# Patient Record
Sex: MALE | Race: WHITE | NOT HISPANIC OR LATINO | Employment: FULL TIME | URBAN - METROPOLITAN AREA
[De-identification: names, ages, dates, MRNs, and addresses within clinical notes are randomized per-mention and may not be internally consistent; named-entity substitution may affect disease eponyms.]

---

## 2017-01-12 ENCOUNTER — OFFICE VISIT (OUTPATIENT)
Dept: URGENT CARE | Facility: CLINIC | Age: 47
End: 2017-01-12
Payer: COMMERCIAL

## 2017-01-12 PROCEDURE — 99213 OFFICE O/P EST LOW 20 MIN: CPT

## 2017-05-05 ENCOUNTER — ALLSCRIPTS OFFICE VISIT (OUTPATIENT)
Dept: OTHER | Facility: OTHER | Age: 47
End: 2017-05-05

## 2018-01-10 NOTE — PSYCH
Psych Med Mgmt    Appearance: was calm and cooperative  Observed mood: mood appropriate  Observed mood: affect appropriate  Speech: a normal rate  Thought processes: coherent/organized  Hallucinations: no hallucinations present  Thought Content: no delusions  Abnormal Thoughts: The patient has no suicidal thoughts and no homicidal thoughts  Orientation: The patient is oriented to person, place and time  Recent and Remote Memory: short term memory intact and long term memory intact  Attention Span And Concentration: concentration intact  Insight: Insight intact  Judgment: His judgment was intact  Treatment Recommendations: Follow up in 3 months  Same meds  He reports normal appetite, normal energy level and no weight change  Looks and feels better  Has lost approxiamately 20 pounds  Eating chicken and fish  No depression  Meds are working well  Still estranged from his daughter, but is feeling less guilty about their relationship  Continue currrent meds  Labs--OK>  Vitals  Signs [Data Includes: Current Encounter]   Recorded: 02VYM6372 13:90RZ   Systolic: 646  Diastolic: 80  Height: 5 ft 8 in  Weight: 220 lb   BMI Calculated: 33 45  BSA Calculated: 2 13    DSM    Provisional Diagnosis: Bipolar Depression    GAF--70  Assessment    1  Bipolar 2 disorder (296 89) (F31 81)    Plan    1  FLUoxetine HCl - 20 MG Oral Capsule (PROzac); take 1 capsule daily   2  Lithium Carbonate 300 MG Oral Tablet; Take 1 tablet twice daily    Active Problems    1  Bipolar 2 disorder (296 89) (F31 81)    Allergies    1  No Known Drug Allergies    Current Meds   1  FLUoxetine HCl - 20 MG Oral Capsule; take 1 capsule daily; Therapy: 29Apr2016 to (Evaluate:88Ztd5086)  Requested for: 29Apr2016; Last   Rx:29Apr2016 Ordered   2  Lithium Carbonate 300 MG Oral Tablet; Take 1 tablet twice daily;    Therapy: 28QIP4539 to (Alfred Pace)  Requested for: 29Apr2016; Last   Veda Sarkar Ordered    Family Psych History  Father    1  Family history of Anxiety and depression  Daughter    2  Family history of Anxiety and depression    Social History    · Employed   · Former smoker (A67 15) (H46 334)   ·     End of Encounter Meds    1  FLUoxetine HCl - 20 MG Oral Capsule (PROzac); take 1 capsule daily; Therapy: 87LKO7045 to (Evaluate:70Afo4666)  Requested for: 24Jun2016; Last   Rx:24Jun2016 Ordered   2  Lithium Carbonate 300 MG Oral Tablet; Take 1 tablet twice daily;    Therapy: 84UHO8179 to (Evaluate:19Gxt0175)  Requested for: 24Jun2016; Last   VY:20ZPH4298 Ordered    Signatures   Electronically signed by : Lake Parks; Jun 24 2016  4:14PM EST                       (Author)    Electronically signed by : Esperanza Monteiro MD; Jun 27 2016  3:46PM EST

## 2018-01-11 NOTE — PSYCH
Assessment    1  Bipolar 2 disorder (296 89) (F31 81)    Plan    1  FLUoxetine HCl - 20 MG Oral Capsule (PROzac); take 1 capsule daily   2  Lithium Carbonate 300 MG Oral Tablet; Take 1 tablet twice daily    Chief Complaint  "I need back on my meds"      History of Present Illness  39year old male works at [employer] as a   Had done very well on Prozac and Lithium  Had lost insurance and had to stop meds  Resides in [facility] life at home is good  Is more social with other couples  Recent celebration of [place]  Requests to go back on Prozac and Lithium  He said he felt that he functioned better mentally with these meds  Lots of drama in his life with his estranged daughter and he r   No suicidal thoughts  No recent violent episodes  History of mood disorder  Will restart meds  Past Psychiatric History    Past Psychiatric History: History of being on Prozac and Lithium  Worked well for mood disorder  Substance Abuse Hx    Substance Abuse History: Denies  Family Psych History  Father    1  Family history of Anxiety and depression  Daughter    2  Family history of Anxiety and depression    Social History    · Employed   · Former smoker (I16 55) (F2 616)   ·     History Of Phys/Sex Abuse Or Perpetration    History Of Phys/Sex Abuse or Perpetration: None  Vitals  Signs [Data Includes: Current Encounter]   Recorded: 64MRD3280 82:85JG   Systolic: 375  Diastolic: 80  Height: 5 ft 8 in  Weight: 220 lb   BMI Calculated: 33 45  BSA Calculated: 2 13    Physical Exam    Appearance: was calm and cooperative and good eye contact  Observed mood: was dysphoric, depressed and anxious  Observed mood: affect was broad  Speech: a normal rate  Thought processes: flight of ideas  Hallucinations: no hallucinations present  Thought Content: no delusions  Abnormal Thoughts: The patient has no suicidal thoughts and no homicidal thoughts  Orientation: The patient is oriented to person, place and time  Recent and Remote Memory: short term memory intact and long term memory intact  Attention Span And Concentration: concentration intact  Insight: Insight intact  Judgment: His judgment was intact  Treatment Recommendations: Follow up in 8 weeks  DSM    Provisional Diagnosis: Bipolar Depression  GAF-65  End of Encounter Meds    1  FLUoxetine HCl - 20 MG Oral Capsule (PROzac); take 1 capsule daily; Therapy: 55Wzz3648 to (Evaluate:73Erx2996)  Requested for: 74Gzb4772; Last   Rx:93Rhl6372 Ordered   2  Lithium Carbonate 300 MG Oral Tablet; Take 1 tablet twice daily; Therapy: 53Kvy1621 to (Evaluate:88Ndn4039)  Requested for: 94Vay6623; Last   Rx:03Gqn1083 Ordered    Signatures   Electronically signed by : Jaki Edward;  Apr 29 2016  3:27PM EST                       (Author)    Electronically signed by : Jayne Vaz MD; May  2 2016  2:42PM EST

## 2018-01-15 NOTE — PSYCH
Psych Med Mgmt    Appearance: was calm and cooperative and good eye contact  Observed mood: mood appropriate  Observed mood: affect appropriate  Speech: a normal rate  Thought processes: coherent/organized  Hallucinations: no hallucinations present  Thought Content: no delusions  Abnormal Thoughts: The patient has no suicidal thoughts and no homicidal thoughts  Orientation: The patient is oriented to person, place and time  Recent and Remote Memory: short term memory intact and long term memory intact  Attention Span And Concentration: concentration intact  Insight: Insight intact  Judgment: His judgment was intact  Treatment Recommendations: Follow up in 6 months  Same meds  The patient has been filling controlled prescriptions on time as prescribed to [facility] program       He reports normal appetite, normal energy level and no weight change  Doing well  Off Lithium at his choice  Mood has been stable  No new issues or concerns  Working well  Enjoying life  No new concerns  Follow up in 1 year  Vitals  Signs   Recorded: 32PWD1331 23:63DJ   Systolic: 065  Diastolic: 80  Height: 5 ft 8 in  Weight: 225 lb   BMI Calculated: 34 21  BSA Calculated: 2 15    DSM    Provisional Diagnosis: Major Depression no Psychosis  GAF--70  Assessment    1  Bipolar 2 disorder (296 89) (F31 81)    Plan    1  Lithium Carbonate 300 MG Oral Tablet   2  FLUoxetine HCl - 20 MG Oral Capsule (PROzac); take 1 capsule daily    Active Problems    1  Acute URI (465 9) (J06 9)   2  Bipolar 2 disorder (296 89) (F31 81)    Past Medical History    1  No pertinent past medical history    Allergies    1  No Known Drug Allergies    Current Meds   1  FLUoxetine HCl - 20 MG Oral Capsule; take 1 capsule daily; Therapy: 29Apr2016 to (Evaluate:90Azw1552)  Requested for: 39GUN4556; Last   Rx:16Nov2016 Ordered   2   LevoFLOXacin 500 MG Oral Tablet; TAKE 1 TABLET DAILY; Therapy: 20NCH8628 to (Evaluate:19Jan2017)  Requested for: 24LTP1915; Last   Rx:12Jan2017 Ordered   3  Lithium Carbonate 300 MG Oral Tablet; Take 1 tablet twice daily; Therapy: 29Apr2016 to (Evaluate:71Uqp2154)  Requested for: 66PNP2814; Last   Rx:16Nov2016 Ordered   4  Ventolin  (90 Base) MCG/ACT Inhalation Aerosol Solution; INHALE 1 PUFF   EVERY 4 HOURS AS NEEDED; Therapy: 87XIO0531 to (Last Rx:12Jan2017)  Requested for: 12Jan2017 Ordered    Family Psych History  Father    1  Family history of Anxiety and depression  Daughter    2  Family history of Anxiety and depression    Social History    · Employed   · Former smoker (Q55 21) (B90 850)   ·    · Never a smoker    End of Encounter Meds    1  LevoFLOXacin 500 MG Oral Tablet; TAKE 1 TABLET DAILY; Therapy: 56YZB7925 to (Evaluate:19Jan2017)  Requested for: 57BUX6714; Last   Rx:12Jan2017 Ordered   2  Ventolin  (90 Base) MCG/ACT Inhalation Aerosol Solution; INHALE 1 PUFF   EVERY 4 HOURS AS NEEDED; Therapy: 76HJY8796 to (Last Rx:12Jan2017)  Requested for: 12Jan2017 Ordered    3  FLUoxetine HCl - 20 MG Oral Capsule (PROzac); take 1 capsule daily;    Therapy: 29Apr2016 to (Lorenza Chyna)  Requested for: 38ICI2293; Last   IZ:[id number] Ordered    Signatures   Electronically signed by : Ava Ngo; May  5 2017  4:15PM EST                       (Author)    Electronically signed by : Kristin Durham MD; May  8 2017  3:33PM EST

## 2018-01-17 NOTE — PSYCH
Psych Med Mgmt    Appearance: was calm and cooperative and good eye contact  Observed mood: mood appropriate  Observed mood: affect appropriate  Speech: a normal rate  Thought processes: coherent/organized  Thought Content: no delusions  Abnormal Thoughts: The patient has no suicidal thoughts and no homicidal thoughts  Orientation: The patient is oriented to person, place and time  Recent and Remote Memory: short term memory intact and long term memory intact  Attention Span And Concentration: concentration intact  Insight: Insight intact  Judgment: His judgment was intact  Treatment Recommendations: Follow up 3-6 months  Same meds  He reports normal appetite, normal energy level and no weight change  Mood is stable  No mood fluctuations  Has energy, interest, and motivation  Sleep and appetite are good  Continue current meds  Vitals  Signs   Recorded: 73CAP7211 11:37EV   Systolic: 163  Diastolic: 80  Height: 5 ft 8 in  Weight: 225 lb   BMI Calculated: 34 21  BSA Calculated: 2 15    DSM    Provisional Diagnosis: Bipolar Depression  GAF--70  Assessment    1  Bipolar 2 disorder (296 89) (F31 81)    Plan    1  FLUoxetine HCl - 20 MG Oral Capsule (PROzac); take 1 capsule daily   2  Lithium Carbonate 300 MG Oral Tablet; Take 1 tablet twice daily   3  (1) BASIC METABOLIC PROFILE; Status:Active; Requested for:16Nov2016;    4  (1) LITHIUM; Status:Active; Requested for:16Nov2016;    5  (1) TSH; Status:Active; Requested for:16Nov2016; Active Problems    1  Bipolar 2 disorder (296 89) (F31 81)    Allergies    1  No Known Drug Allergies    Current Meds   1  FLUoxetine HCl - 20 MG Oral Capsule; take 1 capsule daily; Therapy: 72QSY1501 to (Evaluate:01Lpl2254)  Requested for: 24Jun2016; Last   Rx:24Jun2016 Ordered   2  Lithium Carbonate 300 MG Oral Tablet; Take 1 tablet twice daily;    Therapy: 17XNZ8664 to (Evaluate:10Sxm3834)  Requested for: 54DBE2165; Last   IY:40LNM3583 Ordered    Family Psych History  Father    1  Family history of Anxiety and depression  Daughter    2  Family history of Anxiety and depression    Social History    · Employed   · Former smoker (B21 46) (Y06 394)   ·     End of Encounter Meds    1  FLUoxetine HCl - 20 MG Oral Capsule (PROzac); take 1 capsule daily; Therapy: 53Ady6753 to (Evaluate:54Orh4837)  Requested for: 20IFF1044; Last   Rx:16Nov2016; Status: ACTIVE - Transmit to Pharmacy - Awaiting Verification Ordered   2  Lithium Carbonate 300 MG Oral Tablet; Take 1 tablet twice daily;    Therapy: 26OFP2310 to (Evaluate:28Qzm1657)  Requested for: 68JUT8213; Last   Rx:16Nov2016 Ordered    Signatures   Electronically signed by : Oneil Yang; Nov 16 2016  4:30PM EST                       (Author)    Electronically signed by : James Canada MD; Nov 16 2016  4:47PM EST

## 2018-01-22 VITALS
WEIGHT: 225 LBS | HEIGHT: 68 IN | DIASTOLIC BLOOD PRESSURE: 80 MMHG | BODY MASS INDEX: 34.1 KG/M2 | SYSTOLIC BLOOD PRESSURE: 120 MMHG

## 2018-05-04 ENCOUNTER — OFFICE VISIT (OUTPATIENT)
Dept: PSYCHIATRY | Facility: CLINIC | Age: 48
End: 2018-05-04
Payer: COMMERCIAL

## 2018-05-04 DIAGNOSIS — F32.5 MAJOR DEPRESSION IN COMPLETE REMISSION (HCC): Primary | ICD-10-CM

## 2018-05-04 PROBLEM — Z09 FOLLOW UP: Status: ACTIVE | Noted: 2018-05-04

## 2018-05-04 PROCEDURE — 99213 OFFICE O/P EST LOW 20 MIN: CPT | Performed by: NURSE PRACTITIONER

## 2018-05-04 NOTE — PSYCH
PROGRESS NOTE        [facility]      Name and Date of Birth:  Mauro Jung 52 y o  1970    Date of Visit: 05/04/18    SUBJECTIVE:  Seen after 1 year  Doing very well with job, family and now is playing in a band  No mood fluctuations  Is happy, robust and not depressed  Has not been taking any psych meds, and really does not need them  He will call PRN in future  Nile Sánchez continues to feel better since the last visit  He denies significant depressive symptoms, continues to do well  He denies suicidal ideation, intent or plan at present, has no suicidal ideation, intent or plan at present  He denies any auditory hallucinations and visual hallucinations, denies any other delusional thinking, denies any delusional thinking  He denies any side effects from medications    HPI ROS Appetite Changes and Sleep: normal appetite, normal sleep    Review Of Systems:      Constitutional Negative   ENT Negative   Cardiovascular Negative   Respiratory As Noted in HPI   Gastrointestinal Negative   Genitourinary Negative   Musculoskeletal Negative   Integumentary Negative   Neurological Negative   Endocrine Negative   Other Symptoms Negative and None       Laboratory Results: No results found for this or any previous visit  Substance Abuse History:    History   Drug Use No       Family Psychiatric History:     No family history on file  The following portions of the patient's history were reviewed and updated as appropriate: past family history, past medical history, past social history, past surgical history and problem list     Social History     Social History    Marital status: /Civil Union     Spouse name: N/A    Number of children: N/A    Years of education: N/A     Occupational History    Not on file       Social History Main Topics    Smoking status: Current Every Day Smoker    Smokeless tobacco: Never Used    Alcohol use No    Drug use: No    Sexual activity: Yes     Other Topics Concern    Not on file     Social History Narrative    No narrative on file     Social History     Social History Narrative    No narrative on file        Social History     Tobacco History     Smoking Status  Current Every Day Smoker    Smokeless Tobacco Use  Never Used          Alcohol History     Alcohol Use Status  No          Drug Use     Drug Use Status  No          Sexual Activity     Sexually Active  Yes          Activities of Daily Living    Not Asked                     OBJECTIVE:     Mental Status Evaluation:    Appearance age appropriate, casually dressed   Behavior pleasant, cooperative   Speech normal volume, normal pitch   Mood improved   Affect brighter   Thought Processes logical   Associations intact associations   Thought Content normal   Perceptual Disturbances: none   Abnormal Thoughts  Risk Potential Suicidal ideation - None  Homicidal ideation - None  Potential for aggression - Yes   Orientation oriented to person, place, time/date and situation   Memory recent and remote memory grossly intact   Cosciousness alert and awake   Attention Span attention span and concentration are age appropriate   Intellect Appears to be of Average Intelligence   Insight age appropriate and improved   Judgement good and improved   Muscle Strength and  Gait muscle strength and tone were normal   Language no difficulty naming common objects   Fund of Knowledge displays adequate knowledge of current events   Pain none   Pain Scale 0       Assessment/Plan:       There are no diagnoses linked to this encounter  Treatment Recommendations/Precautions:    Continue current medications:    Risks/Benefits      Risks, Benefits And Possible Side Effects Of Medications:    Risks, benefits, and possible side effects of medications explained to patient and patient verbalizes understanding and agreement for treatment      Controlled Medication Discussion:     Not applicable    Psychotherapy Provided:     Individual psychotherapy provided: No

## 2020-05-25 ENCOUNTER — OFFICE VISIT (OUTPATIENT)
Dept: URGENT CARE | Facility: CLINIC | Age: 50
End: 2020-05-25
Payer: COMMERCIAL

## 2020-05-25 VITALS
RESPIRATION RATE: 20 BRPM | WEIGHT: 230 LBS | DIASTOLIC BLOOD PRESSURE: 73 MMHG | TEMPERATURE: 97.2 F | SYSTOLIC BLOOD PRESSURE: 116 MMHG | HEART RATE: 72 BPM | HEIGHT: 68 IN | OXYGEN SATURATION: 96 % | BODY MASS INDEX: 34.86 KG/M2

## 2020-05-25 DIAGNOSIS — H10.13 ALLERGIC CONJUNCTIVITIS AND RHINITIS, BILATERAL: Primary | ICD-10-CM

## 2020-05-25 DIAGNOSIS — J30.9 ALLERGIC CONJUNCTIVITIS AND RHINITIS, BILATERAL: Primary | ICD-10-CM

## 2020-05-25 PROCEDURE — 99202 OFFICE O/P NEW SF 15 MIN: CPT | Performed by: NURSE PRACTITIONER

## 2020-05-25 RX ORDER — AZELASTINE HYDROCHLORIDE 0.5 MG/ML
1 SOLUTION/ DROPS OPHTHALMIC 2 TIMES DAILY
Qty: 6 ML | Refills: 2 | Status: SHIPPED | OUTPATIENT
Start: 2020-05-25

## 2020-05-25 RX ORDER — ALBUTEROL SULFATE 90 UG/1
1 AEROSOL, METERED RESPIRATORY (INHALATION) EVERY 4 HOURS PRN
COMMUNITY
Start: 2017-01-12

## 2021-01-05 ENCOUNTER — APPOINTMENT (EMERGENCY)
Dept: ULTRASOUND IMAGING | Facility: HOSPITAL | Age: 51
End: 2021-01-05
Payer: COMMERCIAL

## 2021-01-05 ENCOUNTER — OFFICE VISIT (OUTPATIENT)
Dept: URGENT CARE | Facility: CLINIC | Age: 51
End: 2021-01-05
Payer: COMMERCIAL

## 2021-01-05 ENCOUNTER — HOSPITAL ENCOUNTER (EMERGENCY)
Facility: HOSPITAL | Age: 51
Discharge: HOME | End: 2021-01-05
Attending: FAMILY MEDICINE | Admitting: FAMILY MEDICINE
Payer: COMMERCIAL

## 2021-01-05 VITALS
HEART RATE: 92 BPM | BODY MASS INDEX: 36.07 KG/M2 | OXYGEN SATURATION: 97 % | RESPIRATION RATE: 20 BRPM | HEIGHT: 68 IN | SYSTOLIC BLOOD PRESSURE: 112 MMHG | TEMPERATURE: 98 F | WEIGHT: 238 LBS | DIASTOLIC BLOOD PRESSURE: 59 MMHG

## 2021-01-05 VITALS
BODY MASS INDEX: 36.07 KG/M2 | SYSTOLIC BLOOD PRESSURE: 133 MMHG | HEIGHT: 68 IN | OXYGEN SATURATION: 96 % | WEIGHT: 238 LBS | HEART RATE: 86 BPM | RESPIRATION RATE: 20 BRPM | DIASTOLIC BLOOD PRESSURE: 91 MMHG | TEMPERATURE: 98 F

## 2021-01-05 DIAGNOSIS — N50.811 PAIN IN RIGHT TESTICLE: Primary | ICD-10-CM

## 2021-01-05 LAB
BACTERIA UR QL AUTO: NORMAL /HPF
BILIRUB UR QL STRIP: NEGATIVE
CLARITY UR: CLEAR
COLOR UR: YELLOW
GLUCOSE UR STRIP-MCNC: NEGATIVE MG/DL
HGB UR QL STRIP.AUTO: ABNORMAL
KETONES UR STRIP-MCNC: NEGATIVE MG/DL
LEUKOCYTE ESTERASE UR QL STRIP: NEGATIVE
NITRITE UR QL STRIP: NEGATIVE
NON-SQ EPI CELLS URNS QL MICRO: NORMAL /HPF
PH UR STRIP.AUTO: 7 [PH]
PROT UR STRIP-MCNC: NEGATIVE MG/DL
RBC #/AREA URNS AUTO: NORMAL /HPF
SP GR UR STRIP.AUTO: 1.01 (ref 1–1.03)
UROBILINOGEN UR QL STRIP.AUTO: 0.2 E.U./DL
WBC #/AREA URNS AUTO: NORMAL /HPF

## 2021-01-05 PROCEDURE — 99284 EMERGENCY DEPT VISIT MOD MDM: CPT

## 2021-01-05 PROCEDURE — 99284 EMERGENCY DEPT VISIT MOD MDM: CPT | Performed by: PHYSICIAN ASSISTANT

## 2021-01-05 PROCEDURE — 76870 US EXAM SCROTUM: CPT

## 2021-01-05 PROCEDURE — 87491 CHLMYD TRACH DNA AMP PROBE: CPT | Performed by: PHYSICIAN ASSISTANT

## 2021-01-05 PROCEDURE — 81001 URINALYSIS AUTO W/SCOPE: CPT | Performed by: PHYSICIAN ASSISTANT

## 2021-01-05 PROCEDURE — 87591 N.GONORRHOEAE DNA AMP PROB: CPT | Performed by: PHYSICIAN ASSISTANT

## 2021-01-05 PROCEDURE — 99203 OFFICE O/P NEW LOW 30 MIN: CPT | Performed by: PHYSICIAN ASSISTANT

## 2021-01-05 NOTE — ED PROVIDER NOTES
History  Chief Complaint   Patient presents with    Testicle Pain     right       42-year-old male history of asthma presents from urgent care complaining of testicle pain  Patient states that the pain is been going on for the past 2 weeks  Denies any trauma to his testicles  Denies any new sexual partners  He is in a monogamous relationship with his wife  No dysuria, hematuria or penile discharge  Has never had this problem before  Prior to Admission Medications   Prescriptions Last Dose Informant Patient Reported? Taking? albuterol (Ventolin HFA) 90 mcg/act inhaler   Yes No   Sig: Inhale 1 puff every 4 (four) hours as needed   azelastine (OPTIVAR) 0 05 % ophthalmic solution   No No   Sig: Administer 1 drop to both eyes 2 (two) times a day   Patient not taking: Reported on 1/5/2021      Facility-Administered Medications: None       History reviewed  No pertinent past medical history  History reviewed  No pertinent surgical history  Family History   Problem Relation Age of Onset    Cancer Mother     Cancer Father      I have reviewed and agree with the history as documented  E-Cigarette/Vaping    E-Cigarette Use Never User      E-Cigarette/Vaping Substances     Social History     Tobacco Use    Smoking status: Current Every Day Smoker    Smokeless tobacco: Never Used   Substance Use Topics    Alcohol use: Yes     Frequency: Monthly or less     Drinks per session: 1 or 2    Drug use: No       Review of Systems   Constitutional: Negative for chills, fatigue and fever  HENT: Negative for congestion and sore throat  Eyes: Negative for pain  Respiratory: Negative for cough, chest tightness, shortness of breath and wheezing  Cardiovascular: Negative for chest pain, palpitations and leg swelling  Gastrointestinal: Negative for abdominal pain, constipation, diarrhea, nausea and vomiting  Endocrine: Negative for polyuria  Genitourinary: Positive for testicular pain   Negative for dysuria  Musculoskeletal: Negative for arthralgias, back pain, myalgias and neck pain  Skin: Negative for rash  Neurological: Negative for dizziness, syncope, light-headedness and headaches  All other systems reviewed and are negative  Physical Exam  Physical Exam  Vitals signs reviewed  Constitutional:       Appearance: He is well-developed  HENT:      Head: Normocephalic and atraumatic  Neck:      Musculoskeletal: Normal range of motion  Cardiovascular:      Rate and Rhythm: Normal rate and regular rhythm  Heart sounds: Normal heart sounds  Pulmonary:      Effort: Pulmonary effort is normal       Breath sounds: Normal breath sounds  Abdominal:      General: Bowel sounds are normal       Palpations: Abdomen is soft  Tenderness: There is no abdominal tenderness  Genitourinary:     Comments: Normal appearing male genitalia  No penile discharge  Penis circumsized  Normal appearing scrotum  Musculoskeletal: Normal range of motion  Skin:     General: Skin is warm and dry  Capillary Refill: Capillary refill takes less than 2 seconds  Neurological:      Mental Status: He is alert and oriented to person, place, and time           Vital Signs  ED Triage Vitals [01/05/21 1230]   Temperature Pulse Respirations Blood Pressure SpO2   98 °F (36 7 °C) 86 20 133/91 96 %      Temp src Heart Rate Source Patient Position - Orthostatic VS BP Location FiO2 (%)   -- -- -- -- --      Pain Score       4           Vitals:    01/05/21 1230   BP: 133/91   Pulse: 86         Visual Acuity      ED Medications  Medications - No data to display    Diagnostic Studies  Results Reviewed     Procedure Component Value Units Date/Time    Urine Microscopic [[id number]]  (Normal) Collected: 01/05/21 1325    Lab Status: Final result Specimen: Urine, Other Updated: 01/05/21 1344     RBC, UA 2-4 /hpf      WBC, UA 0-1 /hpf      Epithelial Cells Occasional /hpf      Bacteria, UA None Seen /hpf     UA w Reflex to Microscopic w Reflex to Culture [[id number]]  (Abnormal) Collected: 01/05/21 1325    Lab Status: Final result Specimen: Urine, Other Updated: 01/05/21 1333     Color, UA Yellow     Clarity, UA Clear     Specific Gravity, UA 1 015     pH, UA 7 0     Leukocytes, UA Negative     Nitrite, UA Negative     Protein, UA Negative mg/dl      Glucose, UA Negative mg/dl      Ketones, UA Negative mg/dl      Urobilinogen, UA 0 2 E U /dl      Bilirubin, UA Negative     Blood, UA Trace-Intact    Chlamydia/GC amplified DNA by PCR [[id number]] Collected: 01/05/21 1326    Lab Status: In process Specimen: Urine, Other Updated: 01/05/21 1330                 US scrotum and testicles   Final Result by Kevin Sánchez MD (01/05 1328)       No torsion or orchitis/epididymitis  Bilateral varicoceles  Cluster of right epididymal head cyst       Workstation performed: [id number]                    Procedures  Procedures         ED Course                                           MDM  Number of Diagnoses or Management Options  Pain in right testicle:   Diagnosis management comments:   Scrotal ultrasound shows no torsion  Cyst and varicocele demonstrated  Recommend patient follow up with Urology  Referral provided  Patient agreeable to plan  Patient declined appear therapy for STDs  Will follow-up on urine culture  Disposition  Final diagnoses:   Pain in right testicle     Time reflects when diagnosis was documented in both MDM as applicable and the Disposition within this note     Time User Action Codes Description Comment    1/5/2021  2:00 PM Saima Castro Add [N50 811] Pain in right testicle       ED Disposition     ED Disposition Condition Date/Time Comment    Discharge Stable Tue Jan 5, 2021  2:00 PM [facility] discharge to home/self care              Follow-up Information     Follow up With Specialties Details Why Adam Diaz MD Urology [facility] an [facility] as [facility]            Discharge Medication List as of 1/5/2021  2:14 PM      CONTINUE these medications which have NOT CHANGED    Details   albuterol (Ventolin HFA) 90 mcg/act inhaler Inhale 1 puff every 4 (four) hours as needed, Starting Thu 1/12/2017, Historical Med      azelastine (OPTIVAR) 0 05 % ophthalmic solution Administer 1 drop to both eyes 2 (two) times a day, Starting Mon 5/25/2020, Normal           No discharge procedures on file      PDMP Review     None          ED Provider  Electronically Signed by           Majorie Cheadle, PA-C  01/05/21 9467

## 2021-01-05 NOTE — PROGRESS NOTES
[address] [facility]        NAME: Jose A Collins is a 48 y o  male  : 1970    MRN: [id number]  DATE: 2021  TIME: 12:11 PM    Assessment and Plan   Pain in right testicle [N50 811]  1  Pain in right testicle  Transfer to other facility         Patient Instructions       Directly to the emergency room for further evaluation  Chief Complaint     Chief Complaint   Patient presents with    Groin Pain     right testcile pain x 2 weeks ,worse today          History of Present Illness       Patient presents with a 2 week history of right testicle pain  He states initially the pain was intermittent, however, today the pain is constant  Review of Systems   Review of Systems   Constitutional: Negative for fever  Genitourinary: Positive for testicular pain  Current Medications       Current Outpatient Medications:     albuterol (Ventolin HFA) 90 mcg/act inhaler, Inhale 1 puff every 4 (four) hours as needed, Disp: , Rfl:     azelastine (OPTIVAR) 0 05 % ophthalmic solution, Administer 1 drop to both eyes 2 (two) times a day (Patient not taking: Reported on 2021), Disp: 6 mL, Rfl: 2    Current Allergies     Allergies as of 2021    (No Known Allergies)            The following portions of the patient's history were reviewed and updated as appropriate: allergies, current medications, past family history, past medical history, past social history, past surgical history and problem list      History reviewed  No pertinent past medical history  No past surgical history on file  Family History   Problem Relation Age of Onset    Cancer Mother     Cancer Father          Medications have been verified  Objective   /59 (BP Location: Left arm, Patient Position: Sitting, Cuff Size: Adult)   Pulse 92   Temp 98 °F (36 7 °C)   Resp 20   Ht 5' 8" (1 727 m)   Wt 108 kg (238 lb)   SpO2 97%   BMI 36 19 kg/m²   No LMP for male patient         Physical Exam     Physical Exam  Vitals signs and nursing note reviewed  Constitutional:       Appearance: Normal appearance  HENT:      Head: Normocephalic and atraumatic  Cardiovascular:      Rate and Rhythm: Normal rate and regular rhythm  Pulmonary:      Effort: Pulmonary effort is normal    Genitourinary:     Comments: Right testicle with slight swelling and exquisite tenderness  Neurological:      Mental Status: He is alert

## 2021-01-05 NOTE — DISCHARGE INSTRUCTIONS
You do not have testicular torsion  Follow up with your family doctor or urologist   The following findings require follow up:  Radiographic finding   Finding: Bilateral varicoceles   Cluster of right epididymal head cyst    Follow up required: 1 month   Follow up should be done within 1 month(s)

## 2021-01-06 LAB
C TRACH DNA SPEC QL NAA+PROBE: NEGATIVE
N GONORRHOEA DNA SPEC QL NAA+PROBE: NEGATIVE

## 2021-01-07 ENCOUNTER — TELEPHONE (OUTPATIENT)
Dept: UROLOGY | Facility: MEDICAL CENTER | Age: 51
End: 2021-01-07

## 2021-01-07 NOTE — TELEPHONE ENCOUNTER
Please Triage - ER FU  New Patient- Bernice Rosendo    What is the reason for the patients appointment? cyst     Imaging/Lab Results:  FINDINGS:     TESTES:   Testes are symmetric and normal in size      RIGHT testis = 4 4 x 2 0 x 2 9 cm   Normal contour with homogeneous smooth echotexture  No intratesticular mass lesion or calcifications      LEFT testis = 4 2 x 1 9 x 2 4 cm  Normal contour with homogeneous smooth echotexture  No intratesticular mass lesion or calcifications      Doppler flow within both testes is present and symmetric      EPIDIDYMIDES:   Normal Size  Doppler ultrasound demonstrates normal blood flow  Cluster of right epididymal cysts, ##1/30 in conglomerate measuring 0 9 x 0 7 x 0 9 cm      HYDROCELE:  No significant fluid present      VARICOCELE:  Bilateral varicoceles are present      SCROTUM:  Scrotal thickness and appearance within normal limits  No evidence for extratesticular mass or hernia demonstrated      IMPRESSION:     No torsion or orchitis/epididymitis      Bilateral varicoceles      Cluster of right epididymal head cyst     Do we accept the patient's insurance or is the patient Self-Pay? Provider & Plan: [facility]  Member ID#: [id number]     Has the patient had any previous urologist(s)? No     Have patient records been requested? No     Has the patient had any outside testing done? No    Does the patient have a personal history of cancer?   No    Patient can be reached at : [phone]

## 2021-01-07 NOTE — TELEPHONE ENCOUNTER
Called and spoke with patient  Patient scheduled 01/26/21 at 3:30pm in the [facility] office

## 2021-01-26 ENCOUNTER — TELEMEDICINE (OUTPATIENT)
Dept: UROLOGY | Facility: CLINIC | Age: 51
End: 2021-01-26
Payer: COMMERCIAL

## 2021-01-26 DIAGNOSIS — R35.0 BENIGN PROSTATIC HYPERPLASIA WITH URINARY FREQUENCY: Primary | ICD-10-CM

## 2021-01-26 DIAGNOSIS — R31.29 MICROHEMATURIA: ICD-10-CM

## 2021-01-26 DIAGNOSIS — N40.1 BENIGN PROSTATIC HYPERPLASIA WITH URINARY FREQUENCY: Primary | ICD-10-CM

## 2021-01-26 PROCEDURE — 99442 PR PHYS/QHP TELEPHONE EVALUATION 11-20 MIN: CPT | Performed by: PHYSICIAN ASSISTANT

## 2021-01-26 RX ORDER — TAMSULOSIN HYDROCHLORIDE 0.4 MG/1
0.4 CAPSULE ORAL
Qty: 90 CAPSULE | Refills: 0 | Status: SHIPPED | OUTPATIENT
Start: 2021-01-26 | End: 2021-05-06 | Stop reason: SDUPTHER

## 2021-01-26 RX ORDER — TAMSULOSIN HYDROCHLORIDE 0.4 MG/1
0.4 CAPSULE ORAL
Qty: 90 CAPSULE | Refills: 0 | Status: SHIPPED | OUTPATIENT
Start: 2021-01-26 | End: 2021-01-26

## 2021-01-26 NOTE — PROGRESS NOTES
Virtual Brief Visit    Assessment/Plan:    1  Right testicular pain  - US 1/5/2021: no torsion, right epididymal head cysts, b/l varicocele    2  Microscopic hematuria  - 2-4 rbcs on UA micro, neg for UTI    Having persistent intermittent right groin pain I think this is probably post traumatic from when his dog jumped on him  However there is also microhematuria  Check screening US renal and bladder  US scrotum/testes already complete  Could have a distal stone  He's a smoker too  Consider cystoscopy  Problem List Items Addressed This Visit     None      Visit Diagnoses     Benign prostatic hyperplasia with urinary frequency    -  Primary    Relevant Medications    tamsulosin (FLOMAX) 0 4 mg    Other Relevant Orders    US kidney and bladder with pvr    Microhematuria        Relevant Orders    US kidney and bladder with pvr                Reason for visit is   Chief Complaint   Patient presents with    Virtual Brief Visit        Encounter provider Adrian Bennett PA-C    Provider located at [address] [place] [phone]    Recent Visits  Date Type Provider Dept   01/27/21 Telephone Adrian Bennett PA-C [facility] [place]   01/26/21 [address] [place] [address], [id number] [address] For [facility] Nadine Simmons recent visits within past 7 days and meeting all other requirements     Future Appointments  No visits were found meeting these conditions  Showing future appointments within next 150 days and meeting all other requirements        After connecting through telephone, the patient was identified by name and date of birth  Sohan Strong was informed that this is a telemedicine visit and that the visit is being conducted through telephone  My office door was closed  No one else was in the room  He acknowledged consent and understanding of privacy and security of the platform   The patient has agreed to participate and understands he can discontinue the visit at any time  Patient is aware this is a billable service  Subjective    Carloz Duran is a 48 y o  male ER followup right testicular pain  Presented 1/5/2021 with two week history of more constant now, dull right testicular pain, hurts to touch at times  Skin is normal without swelling or rash  US and UA as above  Gc/chlam negative  Dog jumped up on his lap few weeks prior  No preceding trauma or infectious symptoms  No LUTS or gross hematuria  No prior abdominopelvic or lumbar surgeries  UA with microhematuria, no history of gross hematuria, he is an every day smoker  No past medical history on file  No past surgical history on file  Current Outpatient Medications   Medication Sig Dispense Refill    albuterol (Ventolin HFA) 90 mcg/act inhaler Inhale 1 puff every 4 (four) hours as needed      azelastine (OPTIVAR) 0 05 % ophthalmic solution Administer 1 drop to both eyes 2 (two) times a day (Patient not taking: Reported on 1/5/2021) 6 mL 2    tamsulosin (FLOMAX) 0 4 mg Take 1 capsule (0 4 mg total) by mouth daily with dinner 90 capsule 0     No current facility-administered medications for this visit  No Known Allergies    Review of Systems   Constitutional: Negative for activity change, appetite change, chills, fever and unexpected weight change  HENT: Negative  Respiratory: Negative  Negative for shortness of breath  Cardiovascular: Negative  Negative for chest pain  Gastrointestinal: Negative for abdominal pain, diarrhea, nausea and vomiting  Endocrine: Negative  Genitourinary: Positive for testicular pain  Negative for decreased urine volume, difficulty urinating, dysuria, flank pain, frequency, hematuria and urgency  Musculoskeletal: Negative for back pain and gait problem  Skin: Negative  Allergic/Immunologic: Negative  Neurological: Negative  Hematological: Negative for adenopathy   Does not bruise/bleed easily  There were no vitals filed for this visit  I spent 20 minutes with patient today in which greater than 50% of the time was spent in counseling/coordination of care regarding testicular pain, microhematuria    VIRTUAL VISIT DISCLAIMER    Skyler Natarajan acknowledges that he has consented to an online visit or consultation  He understands that the online visit is based solely on information provided by him, and that, in the absence of a face-to-face physical evaluation by the physician, the diagnosis he receives is both limited and provisional in terms of accuracy and completeness  This is not intended to replace a full medical face-to-face evaluation by the physician  Skyler Natarajan understands and accepts these terms

## 2021-01-27 ENCOUNTER — TELEPHONE (OUTPATIENT)
Dept: UROLOGY | Facility: CLINIC | Age: 51
End: 2021-01-27

## 2021-01-28 ENCOUNTER — HOSPITAL ENCOUNTER (OUTPATIENT)
Dept: ULTRASOUND IMAGING | Facility: HOSPITAL | Age: 51
Discharge: HOME | End: 2021-01-28
Payer: COMMERCIAL

## 2021-01-28 DIAGNOSIS — R31.29 MICROHEMATURIA: ICD-10-CM

## 2021-01-28 DIAGNOSIS — R35.0 BENIGN PROSTATIC HYPERPLASIA WITH URINARY FREQUENCY: ICD-10-CM

## 2021-01-28 DIAGNOSIS — N40.1 BENIGN PROSTATIC HYPERPLASIA WITH URINARY FREQUENCY: ICD-10-CM

## 2021-01-28 PROCEDURE — 51798 US URINE CAPACITY MEASURE: CPT

## 2021-02-01 ENCOUNTER — TELEPHONE (OUTPATIENT)
Dept: UROLOGY | Facility: CLINIC | Age: 51
End: 2021-02-01

## 2021-02-01 NOTE — TELEPHONE ENCOUNTER
[facility] patient seen for testicular pain microhematuria and bph  Please let him know his US kidneys and bladder is normal  No stones  Bladder emptying OK  Continue flomax and f/u in 8 weeks as planned   Might need cysto at some point for the microhematuria will see what PB says

## 2021-02-02 NOTE — TELEPHONE ENCOUNTER
Called and spoke with pt Advised pt of US results and javier's recommendations   PT verbalized understanding Pt scheduled 3/23 at 11:30am with Landen in [facility]

## 2021-02-03 ENCOUNTER — OFFICE VISIT (OUTPATIENT)
Dept: URGENT CARE | Facility: CLINIC | Age: 51
End: 2021-02-03
Payer: COMMERCIAL

## 2021-02-03 VITALS
TEMPERATURE: 98.1 F | OXYGEN SATURATION: 95 % | WEIGHT: 238 LBS | HEART RATE: 90 BPM | RESPIRATION RATE: 24 BRPM | BODY MASS INDEX: 36.07 KG/M2 | HEIGHT: 68 IN

## 2021-02-03 DIAGNOSIS — H10.32 ACUTE BACTERIAL CONJUNCTIVITIS OF LEFT EYE: Primary | ICD-10-CM

## 2021-02-03 DIAGNOSIS — B34.9 VIRAL ILLNESS: ICD-10-CM

## 2021-02-03 PROCEDURE — 0241U HB NFCT DS VIR RESP RNA 4 TRGT: CPT | Performed by: PHYSICIAN ASSISTANT

## 2021-02-03 PROCEDURE — 99213 OFFICE O/P EST LOW 20 MIN: CPT | Performed by: PHYSICIAN ASSISTANT

## 2021-02-03 RX ORDER — CIPROFLOXACIN HYDROCHLORIDE 3.5 MG/ML
1 SOLUTION/ DROPS TOPICAL
Qty: 4.2 ML | Refills: 0 | Status: SHIPPED | OUTPATIENT
Start: 2021-02-03 | End: 2021-02-03 | Stop reason: SDUPTHER

## 2021-02-03 RX ORDER — CIPROFLOXACIN HYDROCHLORIDE 3.5 MG/ML
1 SOLUTION/ DROPS TOPICAL
Qty: 4.2 ML | Refills: 0 | Status: SHIPPED | OUTPATIENT
Start: 2021-02-03 | End: 2021-02-10

## 2021-02-03 NOTE — PATIENT INSTRUCTIONS
You can take 2000 IU of vitamin D3 daily, vitamin-C 1 g every 12 hours, and a daily multivitamin  Please self quarantine until results of testing are known and if positive please follow CDC guidelines for quarantining as discussed  COVID-19 (Coronavirus Disease 2019)   WHAT YOU NEED TO KNOW:   COVID-19 is the disease caused by the novel (new) coronavirus first discovered in December 2019  Coronaviruses generally cause upper respiratory (nose, throat, and lung) infections, such as a cold  The new virus can also cause serious lower respiratory conditions, such as pneumonia or acute respiratory distress syndrome (ARDS)  Anyone can develop serious problems from the new virus, but your risk is higher if you are 65 or older  A weak immune system, diabetes, or a heart or lung condition can also increase your risk  DISCHARGE INSTRUCTIONS:   If you think you or someone you know may be infected:  Do the following to protect others:  · If emergency care is needed,  tell the  about the possible infection, or call ahead and tell the emergency department  · Call a healthcare provider  for instructions if symptoms are mild  Anyone who may be infected should not  arrive without calling first  The provider will need to protect staff members and other patients  · The person who may be infected needs to wear a face covering  while getting medical care  This will help lower the risk of infecting others  Coverings are not used for anyone who is younger than 2 years, has breathing problems, or cannot remove it  The provider can give you instructions for anyone who cannot wear a covering  Call your local emergency number (911 in the [address] [place] [address],[address]) or go to the emergency department if:   · You have trouble breathing or shortness of breath at rest     · You have chest pain or pressure that lasts longer than 5 minutes  · You become confused or hard to wake  · Your lips or face are blue      · You have a fever of 104°F (40°C) or higher  Call your doctor if:   · You do not  have symptoms of COVID-19 but had close physical contact within 14 days with someone who tested positive  · You have questions or concerns about your condition or care  Medicines: You may need any of the following for mild symptoms:  · Decongestants  help reduce nasal congestion and help you breathe more easily  If you take decongestant pills, they may make you feel restless or cause problems with your sleep  Do not use decongestant sprays for more than a few days  · Cough suppressants  help reduce coughing  Ask your healthcare provider which type of cough medicine is best for you  · Acetaminophen  decreases pain and fever  It is available without a doctor's order  Ask how much to take and how often to take it  Follow directions  Read the labels of all other medicines you are using to see if they also contain acetaminophen, or ask your doctor or pharmacist  Acetaminophen can cause liver damage if not taken correctly  Do not use more than 4 grams (4,000 milligrams) total of acetaminophen in one day  · NSAIDs , such as ibuprofen, help decrease swelling, pain, and fever  NSAIDs can cause stomach bleeding or kidney problems in certain people  If you take blood thinner medicine, always ask your healthcare provider if NSAIDs are safe for you  Always read the medicine label and follow directions  · Take your medicine as directed  Contact your healthcare provider if you think your medicine is not helping or if you have side effects  Tell him or her if you are allergic to any medicine  Keep a list of the medicines, vitamins, and herbs you take  Include the amounts, and when and why you take them  Bring the list or the pill bottles to follow-up visits  Carry your medicine list with you in case of an emergency  How the 2019 coronavirus spreads: The virus spreads quickly and easily   You can become infected if you are in contact with a large amount of the virus, even for a short time  You can also become infected by being around a small amount of virus for a long time  The following are ways the virus is thought to spread, but more information may be coming:  · Droplets are the most common way all coronaviruses spread  The virus can travel in droplets that form when a person talks, coughs, or sneezes  Anyone who breathes in the droplets or gets them in his or her eyes can become infected with the virus  Close personal contact with an infected person is thought to be the main way the virus spreads  Close personal contact means you are within 6 feet (2 meters) of the person  · Person-to-person contact can spread the virus  For example, a person with the virus on his or her hands can spread it by shaking hands with someone  At this time, it does not appear that the virus can be passed to a baby during pregnancy or delivery  The baby can be infected after he or she is born through person-to-person contact  The virus also does not appear to spread in breast milk  If you are pregnant or breastfeeding, talk to your healthcare provider or obstetrician about any concerns you have  · The virus can stay on objects and surfaces  A person can get the virus on his or her hands by touching the object or surface  Infection happens if the person then touches his or her eyes or mouth with unwashed hands  It is not yet known how long the virus can stay on an object or surface  That is why it is important to clean all surfaces that are used regularly  · An infected animal may be able to infect a person who touches it  This may happen at live markets or on a farm  How everyone can lower the risk for COVID-19:  The best way to prevent infection is to avoid anyone who is infected, but this can be hard to do  An infected person can spread the virus before signs or symptoms begin, or even if signs or symptoms never develop   The following can help lower the risk for infection:      · Wash your hands often throughout the day  Use soap and water  Rub your soapy hands together, lacing your fingers  Wash the front and back of each hand, and in between your fingers  Use the fingers of one hand to scrub under the fingernails of the other hand  Wash for at least 20 seconds  Rinse with warm, running water for several seconds  Then dry your hands with a clean towel or paper towel  Use hand  that contains alcohol if soap and water are not available  Do not touch your eyes, nose, or mouth without washing your hands first  Teach children how to wash their hands and use hand   · Cover a sneeze or cough  This prevents droplets from traveling from you to others  Turn your face away and cover your mouth and nose with a tissue  Throw the tissue away  Use the bend of your arm if a tissue is not available  Then wash your hands well with soap and water or use hand   Turn and cover your face if you are around someone who is sneezing or coughing  Teach children how to cover a cough or sneeze  · Follow worldwide, national, and local social distancing guidelines  Social distancing means people avoid close physical contact so the virus cannot spread from one person to another  Keep at least 6 feet (2 meters) between you and others  Also keep this distance from anyone who comes to your home, such as someone making a delivery  · Make a habit of not touching your face  It is not known how long the virus can stay on objects and surfaces  If you get the virus on your hands, you can transfer it to your eyes, nose, or mouth and become infected  You can also transfer it to objects, surfaces, or people  Be aware of what you touch when you go out  Examples include handrails and elevator buttons  Try not to touch anything with bare hands unless it is necessary  Wash your hands before you leave your home and when you return      · Clean and disinfect high-touch surfaces and objects often  Use a disinfecting solution or wipes  You can make a solution by diluting 4 teaspoons of bleach in 1 quart (4 cups) of water  Clean and disinfect even if you think no one living in or coming to your home is infected with the virus  You can wipe items with a disinfecting cloth before you bring them into your home  Wash your hands after you handle anything you bring into your home  · Make your immune system as healthy as possible  A weakened immune system makes you more vulnerable to the new coronavirus  No COVID-19 vaccine is available yet  Vaccines such as the flu and pneumonia vaccines can help your immune system  Your healthcare provider can tell you which vaccines to get, and when to get them  Keep your immune system as strong as possible  Do not smoke  Eat healthy foods, exercise regularly, and try to manage stress  Go to bed and wake up at the same times each day  Social distancing guidelines:  National and local social distancing rules vary  Rules may change over time as restrictions are lifted  Restrictions may return if an outbreak happens where you live  It is important to know and follow all current social distancing rules in your area  The following are general guidelines:  · Limit trips out of your home  You may be able to have food, medicines, and other supplies delivered  If possible, have delivered items left at your door or other area  Try not to have someone hand you an item  You will be so close to the person that the virus can spread between you  · Do not have close physical contact with anyone who does not live in your home  Do not shake hands with, hug, or kiss a person as a greeting  Stand or walk as far from others as possible  If you must use public transportation (such as a bus or subway), try to sit or stand away from others  You can stay safely connected with others through phone calls, e-mail messages, social media websites, and video chats   Check in on anyone who may be having a hard time socially distancing, or who lives alone  Ask administrators at nursing homes or long-term care facilities how you can safely communicate with someone living there  · Wear a cloth face covering around others who do not live in your home  Face coverings help prevent the virus from spreading to others in droplets  You can use a clear face covering if someone needs to read your lips  This is a cloth covering that has plastic over the mouth area so your lips can be seen  Do not use coverings that have breathing valves or vents  The virus can travel out of the valve or vent and be spread to others  Do not take your covering off to talk, cough, or sneeze  Do not use coverings on children younger than 2 years or on anyone who has breathing problems or cannot remove it  · Only allow medical or other necessary professionals into your home  Wear your face covering, and remind professionals to wear a face covering  Remind them to wash their hands when they arrive and before they leave  Do not  let anyone who does not live in your home in, even if the person is not sick  A person can pass the virus to others before symptoms of COVID-19 begin  Some people never even develop symptoms  Children commonly have mild symptoms or no symptoms  It may be hard to tell a child not to hug or kiss you  Explain that this is how he or she can help you stay healthy  · Do not go to someone else's home unless it is necessary  Do not go over to visit, even if the person is lonely  Only go if you need to help him or her  Make sure you both wear face coverings while you are there  · Avoid large gatherings and crowds  Gatherings or crowds of 10 or more individuals can cause the virus to spread  Examples of gatherings include parties, sporting events, [employer] services, and conferences  Crowds may form at beaches, singh, and tourist attractions   Protect yourself by staying away from large gatherings and crowds  · Ask your healthcare provider for other ways to have appointments  You may be able to have appointments without having to go into the provider's office  Some providers offer phone, video, or other types of appointments  You may also be able to get prescriptions for a few months of your medicines at a time  · Stay safe if you must go out to work  You may have a job that can only be done outside your home  Keep physical distance between you and other workers as much as possible  Follow your employer's rules so everyone stays safe  If you have COVID-19 and are recovering at home:  Healthcare providers will give you specific instructions to follow  The following are general guidelines to remind you how to keep others safe until you are well:  · Wash your hands often  Use soap and water as much as possible  You can use hand  that contains alcohol if soap and water are not available  Do not share towels with anyone  If you use paper towels, throw them away in a lined trash can kept in your room or area  Use a covered trash can, if possible  · Do not go out of your home unless it is necessary  You may have to go to your healthcare provider's office for check-ups or to get prescription refills  Do not arrive at the provider's office without an appointment  Providers have to make their offices safe for staff and other patients  · Do not have close physical contact with anyone unless it is necessary  Only have close physical contact with a person giving direct care, or a baby or child you must care for  Family members and friends should not visit you  If possible, stay in a separate area or room of your home if you live with others  No one should go into the area or room except to give you care  You can visit with others by phone, video chat, e-mail, or similar systems  It is important to stay connected with others in your life while you recover      · Wear a face covering while others are near you  This can help prevent droplets from spreading the virus when you talk, sneeze, or cough  Put the covering on before anyone comes into your room or area  Remind the person to cover his or her nose and mouth before going in to provide care for you  · Do not share items  Do not share dishes, towels, or other items with anyone  Items need to be washed after you use them  · Protect your baby  Wash your hands with soap and water often throughout the day  Wear a clean face covering while you breastfeed, or while you express or pump breast milk  If possible, ask someone who is well to care for your baby  You can put breast milk in bottles for the person to use, if needed  Talk to your healthcare provider if you have any questions or concerns about caring for or bonding with your baby  He or she will tell you when to bring your baby in for check-ups and vaccines  He or she will also tell you what to do if you think your baby was infected with the new virus  · Do not handle live animals  Until more is known, it is best not to touch, play with, or handle live animals  Some animals, including pets, have been infected with the new coronavirus  Do not handle or care for animals until you are well  Care includes feeding, petting, and cuddling your pet  Do not let your pet lick you or share your food  Ask someone who is not infected to take care of your pet, if possible  If you must care for a pet, wear a face covering  Wash your hands before and after you give care  · Follow directions from your healthcare provider for being around others after you recover  You will need to wait at least 10 days after symptoms first appeared  Then you will need to have no fever for 24 hours without fever medicine, and no other symptoms  A loss of taste or smell may continue for several months  It is considered okay to be around others if this is your only symptom   It is not known for sure if or for how long a recovered person can pass the virus to others  Your provider may give you instructions, such as continuing social distancing or wearing a face covering around others  How to take care of someone who has COVID-19:  If the person lives in another home, arrange for a time to give care  Remember to bring a few pairs of disposable gloves and a cloth face covering  The following are general guidelines to help you safely care for anyone who has COVID-19:  · Wash your hands often  Wash before and after you go into the person's home, area, or room  Throw paper towels away in a lined trash can that has a lid, if possible  · Do not allow others to go near the person  No one should come into the person's home unless it is necessary  If possible, the person should be in a separate area or room if he or she lives with others  Keep the room's door shut unless you need to go in or out  Have others call, video chat, or e-mail the person if he or she is feeling well enough  The person may feel lonely if he or she is kept separate for a long period of time  Safe communication can help him or her stay connected to family and friends  · Make sure the person's room has good air flow  You may be able to open the window if the weather allows  An air conditioner can also be turned on to help air move  · Contact the person before you go in to give care  Make sure the person is wearing a face covering  Remind him or her to wash his or her hands with soap and water  He or she can use hand  that contains alcohol if soap and water are not available  Put on a face covering before you go in to give care  · Wear gloves while you give care and clean  Clean items the person uses often  Clean countertops, cooking surfaces, and the fronts and insides of the microwave and refrigerator  Clean the shower, toilet, the area around the toilet, the sink, the area around the sink, and faucets  Gather used laundry or bedding   Wash and dry items on the warmest settings the fabric allows  Wash dishes and silverware in hot, soapy water or in a   · Anything you throw away needs to go into a lined trash can  When you need to empty the trash, close the open end of the lining and tie it closed  This helps prevent items the virus is on from spilling out of the trash  Remove your gloves and throw them away  Wash your hands  Follow up with your doctor as directed:  Write down your questions so you remember to ask them during your visits  For more information:   · Centers for Disease Control and Prevention  [address] , [address]  Phone: 5- 407 - 730-4305  Web Address: [employer] com br    © Copyright [address] Information is for End User's use only and may not be sold, redistributed or otherwise used for commercial purposes  All illustrations and images included in CareNotes® are the copyrighted property of [employer]  or [employer]  The above information is an  only  It is not intended as medical advice for individual conditions or treatments  Talk to your doctor, nurse or pharmacist before following any medical regimen to see if it is safe and effective for you

## 2021-02-03 NOTE — PROGRESS NOTES
[facility]        NAME: Itz Muhammad is a 48 y o  male  : 1970    MRN: [id number]  DATE: February 3, 2021  TIME: 4:15 PM    Assessment and Plan   Acute bacterial conjunctivitis of left eye [H10 32]  1  Acute bacterial conjunctivitis of left eye  ciprofloxacin (CILOXAN) 0 3 % ophthalmic solution    DISCONTINUED: ciprofloxacin (CILOXAN) 0 3 % ophthalmic solution   2  Viral illness  Novel Coronavirus 2019(COVID-19), Influenza A/B, RSV [facility]         Patient Instructions     Patient Instructions     You can take 2000 IU of vitamin D3 daily, vitamin-C 1 g every 12 hours, and a daily multivitamin  Please self quarantine until results of testing are known and if positive please follow CDC guidelines for quarantining as discussed  COVID-19 (Coronavirus Disease 2019)   WHAT YOU NEED TO KNOW:   COVID-19 is the disease caused by the novel (new) coronavirus first discovered in 2019  Coronaviruses generally cause upper respiratory (nose, throat, and lung) infections, such as a cold  The new virus can also cause serious lower respiratory conditions, such as pneumonia or acute respiratory distress syndrome (ARDS)  Anyone can develop serious problems from the new virus, but your risk is higher if you are 65 or older  A weak immune system, diabetes, or a heart or lung condition can also increase your risk  DISCHARGE INSTRUCTIONS:   If you think you or someone you know may be infected:  Do the following to protect others:  · If emergency care is needed,  tell the  about the possible infection, or call ahead and tell the emergency department  · Call a healthcare provider  for instructions if symptoms are mild  Anyone who may be infected should not  arrive without calling first  The provider will need to protect staff members and other patients  · The person who may be infected needs to wear a face covering  while getting medical care   This will help lower the risk of infecting others  Coverings are not used for anyone who is younger than 2 years, has breathing problems, or cannot remove it  The provider can give you instructions for anyone who cannot wear a covering  Call your local emergency number (911 in the [address]) or go to the emergency department if:   · You have trouble breathing or shortness of breath at rest     · You have chest pain or pressure that lasts longer than 5 minutes  · You become confused or hard to wake  · Your lips or face are blue  · You have a fever of 104°F (40°C) or higher  Call your doctor if:   · You do not  have symptoms of COVID-19 but had close physical contact within 14 days with someone who tested positive  · You have questions or concerns about your condition or care  Medicines: You may need any of the following for mild symptoms:  · Decongestants  help reduce nasal congestion and help you breathe more easily  If you take decongestant pills, they may make you feel restless or cause problems with your sleep  Do not use decongestant sprays for more than a few days  · Cough suppressants  help reduce coughing  Ask your healthcare provider which type of cough medicine is best for you  · Acetaminophen  decreases pain and fever  It is available without a doctor's order  Ask how much to take and how often to take it  Follow directions  Read the labels of all other medicines you are using to see if they also contain acetaminophen, or ask your doctor or pharmacist  Acetaminophen can cause liver damage if not taken correctly  Do not use more than 4 grams (4,000 milligrams) total of acetaminophen in one day  · NSAIDs , such as ibuprofen, help decrease swelling, pain, and fever  NSAIDs can cause stomach bleeding or kidney problems in certain people  If you take blood thinner medicine, always ask your healthcare provider if NSAIDs are safe for you  Always read the medicine label and follow directions  · Take your medicine as directed  Contact your healthcare provider if you think your medicine is not helping or if you have side effects  Tell him or her if you are allergic to any medicine  Keep a list of the medicines, vitamins, and herbs you take  Include the amounts, and when and why you take them  Bring the list or the pill bottles to follow-up visits  Carry your medicine list with you in case of an emergency  How the 2019 coronavirus spreads: The virus spreads quickly and easily  You can become infected if you are in contact with a large amount of the virus, even for a short time  You can also become infected by being around a small amount of virus for a long time  The following are ways the virus is thought to spread, but more information may be coming:  · Droplets are the most common way all coronaviruses spread  The virus can travel in droplets that form when a person talks, coughs, or sneezes  Anyone who breathes in the droplets or gets them in his or her eyes can become infected with the virus  Close personal contact with an infected person is thought to be the main way the virus spreads  Close personal contact means you are within 6 feet (2 meters) of the person  · Person-to-person contact can spread the virus  For example, a person with the virus on his or her hands can spread it by shaking hands with someone  At this time, it does not appear that the virus can be passed to a baby during pregnancy or delivery  The baby can be infected after he or she is born through person-to-person contact  The virus also does not appear to spread in breast milk  If you are pregnant or breastfeeding, talk to your healthcare provider or obstetrician about any concerns you have  · The virus can stay on objects and surfaces  A person can get the virus on his or her hands by touching the object or surface  Infection happens if the person then touches his or her eyes or mouth with unwashed hands   It is not yet known how long the virus can stay on an object or surface  That is why it is important to clean all surfaces that are used regularly  · An infected animal may be able to infect a person who touches it  This may happen at live markets or on a farm  How everyone can lower the risk for COVID-19:  The best way to prevent infection is to avoid anyone who is infected, but this can be hard to do  An infected person can spread the virus before signs or symptoms begin, or even if signs or symptoms never develop  The following can help lower the risk for infection:      · Wash your hands often throughout the day  Use soap and water  Rub your soapy hands together, lacing your fingers  Wash the front and back of each hand, and in between your fingers  Use the fingers of one hand to scrub under the fingernails of the other hand  Wash for at least 20 seconds  Rinse with warm, running water for several seconds  Then dry your hands with a clean towel or paper towel  Use hand  that contains alcohol if soap and water are not available  Do not touch your eyes, nose, or mouth without washing your hands first  Teach children how to wash their hands and use hand   · Cover a sneeze or cough  This prevents droplets from traveling from you to others  Turn your face away and cover your mouth and nose with a tissue  Throw the tissue away  Use the bend of your arm if a tissue is not available  Then wash your hands well with soap and water or use hand   Turn and cover your face if you are around someone who is sneezing or coughing  Teach children how to cover a cough or sneeze  · Follow worldwide, national, and local social distancing guidelines  Social distancing means people avoid close physical contact so the virus cannot spread from one person to another  Keep at least 6 feet (2 meters) between you and others  Also keep this distance from anyone who comes to your home, such as someone making a delivery      · Make a habit of not touching your face  It is not known how long the virus can stay on objects and surfaces  If you get the virus on your hands, you can transfer it to your eyes, nose, or mouth and become infected  You can also transfer it to objects, surfaces, or people  Be aware of what you touch when you go out  Examples include handrails and elevator buttons  Try not to touch anything with bare hands unless it is necessary  Wash your hands before you leave your home and when you return  · Clean and disinfect high-touch surfaces and objects often  Use a disinfecting solution or wipes  You can make a solution by diluting 4 teaspoons of bleach in 1 quart (4 cups) of water  Clean and disinfect even if you think no one living in or coming to your home is infected with the virus  You can wipe items with a disinfecting cloth before you bring them into your home  Wash your hands after you handle anything you bring into your home  · Make your immune system as healthy as possible  A weakened immune system makes you more vulnerable to the new coronavirus  No COVID-19 vaccine is available yet  Vaccines such as the flu and pneumonia vaccines can help your immune system  Your healthcare provider can tell you which vaccines to get, and when to get them  Keep your immune system as strong as possible  Do not smoke  Eat healthy foods, exercise regularly, and try to manage stress  Go to bed and wake up at the same times each day  Social distancing guidelines:  National and local social distancing rules vary  Rules may change over time as restrictions are lifted  Restrictions may return if an outbreak happens where you live  It is important to know and follow all current social distancing rules in your area  The following are general guidelines:  · Limit trips out of your home  You may be able to have food, medicines, and other supplies delivered  If possible, have delivered items left at your door or other area   Try not to have someone hand you an item  You will be so close to the person that the virus can spread between you  · Do not have close physical contact with anyone who does not live in your home  Do not shake hands with, hug, or kiss a person as a greeting  Stand or walk as far from others as possible  If you must use public transportation (such as a bus or subway), try to sit or stand away from others  You can stay safely connected with others through phone calls, e-mail messages, social media websites, and video chats  Check in on anyone who may be having a hard time socially distancing, or who lives alone  Ask administrators at nursing homes or long-term care facilities how you can safely communicate with someone living there  · Wear a cloth face covering around others who do not live in your home  Face coverings help prevent the virus from spreading to others in droplets  You can use a clear face covering if someone needs to read your lips  This is a cloth covering that has plastic over the mouth area so your lips can be seen  Do not use coverings that have breathing valves or vents  The virus can travel out of the valve or vent and be spread to others  Do not take your covering off to talk, cough, or sneeze  Do not use coverings on children younger than 2 years or on anyone who has breathing problems or cannot remove it  · Only allow medical or other necessary professionals into your home  Wear your face covering, and remind professionals to wear a face covering  Remind them to wash their hands when they arrive and before they leave  Do not  let anyone who does not live in your home in, even if the person is not sick  A person can pass the virus to others before symptoms of COVID-19 begin  Some people never even develop symptoms  Children commonly have mild symptoms or no symptoms  It may be hard to tell a child not to hug or kiss you  Explain that this is how he or she can help you stay healthy      · Do not go to someone else's home unless it is necessary  Do not go over to visit, even if the person is lonely  Only go if you need to help him or her  Make sure you both wear face coverings while you are there  · Avoid large gatherings and crowds  Gatherings or crowds of 10 or more individuals can cause the virus to spread  Examples of gatherings include parties, sporting events, Mandae[employer] services, and conferences  Crowds may form at beaches, singh, and tourist attractions  Protect yourself by staying away from large gatherings and crowds  · Ask your healthcare provider for other ways to have appointments  You may be able to have appointments without having to go into the provider's office  Some providers offer phone, video, or other types of appointments  You may also be able to get prescriptions for a few months of your medicines at a time  · Stay safe if you must go out to work  You may have a job that can only be done outside your home  Keep physical distance between you and other workers as much as possible  Follow your employer's rules so everyone stays safe  If you have COVID-19 and are recovering at home:  Healthcare providers will give you specific instructions to follow  The following are general guidelines to remind you how to keep others safe until you are well:  · Wash your hands often  Use soap and water as much as possible  You can use hand  that contains alcohol if soap and water are not available  Do not share towels with anyone  If you use paper towels, throw them away in a lined trash can kept in your room or area  Use a covered trash can, if possible  · Do not go out of your home unless it is necessary  You may have to go to your healthcare provider's office for check-ups or to get prescription refills  Do not arrive at the provider's office without an appointment  Providers have to make their offices safe for staff and other patients      · Do not have close physical contact with anyone unless it is necessary  Only have close physical contact with a person giving direct care, or a baby or child you must care for  Family members and friends should not visit you  If possible, stay in a separate area or room of your home if you live with others  No one should go into the area or room except to give you care  You can visit with others by phone, video chat, e-mail, or similar systems  It is important to stay connected with others in your life while you recover  · Wear a face covering while others are near you  This can help prevent droplets from spreading the virus when you talk, sneeze, or cough  Put the covering on before anyone comes into your room or area  Remind the person to cover his or her nose and mouth before going in to provide care for you  · Do not share items  Do not share dishes, towels, or other items with anyone  Items need to be washed after you use them  · Protect your baby  Wash your hands with soap and water often throughout the day  Wear a clean face covering while you breastfeed, or while you express or pump breast milk  If possible, ask someone who is well to care for your baby  You can put breast milk in bottles for the person to use, if needed  Talk to your healthcare provider if you have any questions or concerns about caring for or bonding with your baby  He or she will tell you when to bring your baby in for check-ups and vaccines  He or she will also tell you what to do if you think your baby was infected with the new virus  · Do not handle live animals  Until more is known, it is best not to touch, play with, or handle live animals  Some animals, including pets, have been infected with the new coronavirus  Do not handle or care for animals until you are well  Care includes feeding, petting, and cuddling your pet  Do not let your pet lick you or share your food  Ask someone who is not infected to take care of your pet, if possible   If you must care for a pet, wear a face covering  Wash your hands before and after you give care  · Follow directions from your healthcare provider for being around others after you recover  You will need to wait at least 10 days after symptoms first appeared  Then you will need to have no fever for 24 hours without fever medicine, and no other symptoms  A loss of taste or smell may continue for several months  It is considered okay to be around others if this is your only symptom  It is not known for sure if or for how long a recovered person can pass the virus to others  Your provider may give you instructions, such as continuing social distancing or wearing a face covering around others  How to take care of someone who has COVID-19:  If the person lives in another home, arrange for a time to give care  Remember to bring a few pairs of disposable gloves and a cloth face covering  The following are general guidelines to help you safely care for anyone who has COVID-19:  · Wash your hands often  Wash before and after you go into the person's home, area, or room  Throw paper towels away in a lined trash can that has a lid, if possible  · Do not allow others to go near the person  No one should come into the person's home unless it is necessary  If possible, the person should be in a separate area or room if he or she lives with others  Keep the room's door shut unless you need to go in or out  Have others call, video chat, or e-mail the person if he or she is feeling well enough  The person may feel lonely if he or she is kept separate for a long period of time  Safe communication can help him or her stay connected to family and friends  · Make sure the person's room has good air flow  You may be able to open the window if the weather allows  An air conditioner can also be turned on to help air move  · Contact the person before you go in to give care  Make sure the person is wearing a face covering   Remind him or her to wash his or her hands with soap and water  He or she can use hand  that contains alcohol if soap and water are not available  Put on a face covering before you go in to give care  · Wear gloves while you give care and clean  Clean items the person uses often  Clean countertops, cooking surfaces, and the fronts and insides of the microwave and refrigerator  Clean the shower, toilet, the area around the toilet, the sink, the area around the sink, and faucets  Gather used laundry or bedding  Wash and dry items on the warmest settings the fabric allows  Wash dishes and silverware in hot, soapy water or in a   · Anything you throw away needs to go into a lined trash can  When you need to empty the trash, close the open end of the lining and tie it closed  This helps prevent items the virus is on from spilling out of the trash  Remove your gloves and throw them away  Wash your hands  Follow up with your doctor as directed:  Write down your questions so you remember to ask them during your visits  For more information:   · Centers for Disease Control and Prevention  [address] , [address]  Phone: 7- 217 - [phone]  Web Address: [employer] com br    © Copyright [address] Information is for End User's use only and may not be sold, redistributed or otherwise used for commercial purposes  All illustrations and images included in CareNotes® are the copyrighted property of A D A M , Inc  or [facility] Dunham   The above information is an  only  It is not intended as medical advice for individual conditions or treatments  Talk to your doctor, nurse or pharmacist before following any medical regimen to see if it is safe and effective for you  Follow up with PCP in 3-5 days  Proceed to  ER if symptoms worsen      Chief Complaint     Chief Complaint   Patient presents with    Cough     productive for yellow sputum started today     Fever started today     Shortness of Breath     started today with exertionql dyspnea          History of Present Illness        Patient presents with cough,  Fever, shortness of breath with exertion,  And body aches starting this morning  Denies shortness of breath at rest, chest pain,  Loss of taste, loss of smell, confusion, lethargy, palpitations, increased heart rate,  Or any other symptoms   for the past 2 days has had irritation, and drainage of the left eye  Does wear contact lenses  Review of Systems   Review of Systems   Constitutional: Positive for fever  Negative for chills and fatigue  HENT: Positive for congestion, rhinorrhea and sinus pressure  Negative for ear discharge, ear pain, postnasal drip, sinus pain and sore throat  Eyes: Positive for discharge and redness  Negative for pain, itching and visual disturbance  Respiratory: Positive for cough and shortness of breath  Negative for chest tightness and wheezing  Cardiovascular: Negative for chest pain  Gastrointestinal: Negative for abdominal pain, diarrhea, nausea and vomiting  Musculoskeletal: Positive for myalgias  Negative for arthralgias  Neurological: Negative for dizziness, weakness, numbness and headaches  Psychiatric/Behavioral: Negative for behavioral problems and confusion  Current Medications       Current Outpatient Medications:     tamsulosin (FLOMAX) 0 4 mg, Take 1 capsule (0 4 mg total) by mouth daily with dinner, Disp: 90 capsule, Rfl: 0    albuterol (Ventolin HFA) 90 mcg/act inhaler, Inhale 1 puff every 4 (four) hours as needed, Disp: , Rfl:     azelastine (OPTIVAR) 0 05 % ophthalmic solution, Administer 1 drop to both eyes 2 (two) times a day (Patient not taking: Reported on 1/5/2021), Disp: 6 mL, Rfl: 2    ciprofloxacin (CILOXAN) 0 3 % ophthalmic solution, Administer 1 drop into the left eye every 2 (two) hours for 7 days While awake administer drops    Every 2 hours for the 1st 2 days and then every 4 hours for 5 days after , Disp: 4 2 mL, Rfl: 0    Current Allergies     Allergies as of 02/03/2021    (No Known Allergies)            The following portions of the patient's history were reviewed and updated as appropriate: allergies, current medications, past family history, past medical history, past social history, past surgical history and problem list      History reviewed  No pertinent past medical history  No past surgical history on file  Family History   Problem Relation Age of Onset    Cancer Mother     Cancer Father          Medications have been verified  Objective   Pulse 90   Temp 98 1 °F (36 7 °C)   Resp (!) 24   Ht 5' 8" (1 727 m)   Wt 108 kg (238 lb)   SpO2 95%   BMI 36 19 kg/m²        Physical Exam     Physical Exam  Constitutional:       General: He is not in acute distress  Appearance: Normal appearance  He is not ill-appearing or diaphoretic  HENT:      Right Ear: Tympanic membrane, ear canal and external ear normal       Left Ear: Tympanic membrane, ear canal and external ear normal       Nose: Nose normal       Mouth/Throat:      Mouth: Mucous membranes are moist       Pharynx: Oropharynx is clear  Eyes:      General:         Left eye: Discharge (  Clear drainageAnd conjunctival irritation) present  Extraocular Movements: Extraocular movements intact  Pupils: Pupils are equal, round, and reactive to light  Cardiovascular:      Rate and Rhythm: Normal rate and regular rhythm  Heart sounds: Normal heart sounds  Pulmonary:      Effort: Pulmonary effort is normal       Breath sounds: Normal breath sounds  Skin:     General: Skin is warm and dry  Neurological:      Mental Status: He is alert     Psychiatric:         Mood and Affect: Mood normal          Behavior: Behavior normal

## 2021-02-04 LAB
FLUAV RNA RESP QL NAA+PROBE: NEGATIVE
FLUBV RNA RESP QL NAA+PROBE: NEGATIVE
RSV RNA RESP QL NAA+PROBE: NEGATIVE
SARS-COV-2 RNA RESP QL NAA+PROBE: NEGATIVE

## 2021-03-23 ENCOUNTER — OFFICE VISIT (OUTPATIENT)
Dept: UROLOGY | Facility: CLINIC | Age: 51
End: 2021-03-23
Payer: COMMERCIAL

## 2021-03-23 VITALS
SYSTOLIC BLOOD PRESSURE: 124 MMHG | WEIGHT: 240.3 LBS | DIASTOLIC BLOOD PRESSURE: 80 MMHG | BODY MASS INDEX: 36.42 KG/M2 | HEIGHT: 68 IN

## 2021-03-23 DIAGNOSIS — Z12.5 SCREENING FOR MALIGNANT NEOPLASM OF PROSTATE: Primary | ICD-10-CM

## 2021-03-23 DIAGNOSIS — N52.9 ERECTILE DYSFUNCTION, UNSPECIFIED ERECTILE DYSFUNCTION TYPE: ICD-10-CM

## 2021-03-23 PROCEDURE — 99214 OFFICE O/P EST MOD 30 MIN: CPT | Performed by: PHYSICIAN ASSISTANT

## 2021-03-23 RX ORDER — SILDENAFIL 100 MG/1
100 TABLET, FILM COATED ORAL AS NEEDED
Qty: 10 TABLET | Refills: 0 | Status: SHIPPED | OUTPATIENT
Start: 2021-03-23 | End: 2022-04-01 | Stop reason: SDUPTHER

## 2021-03-23 NOTE — PROGRESS NOTES
3/23/2021      Chief Complaint   Patient presents with    Benign Prostatic Hypertrophy         Assessment and Plan    48 y o  male    1  BPH  - continue flomax 0 4mg nightly    2  ED  - begin viagra 100mg prn    3  Testicular pain  - resolved    4  Microhematuria  - single episode 2-4 rbcs at time of testicular injury  - no gross hematuria  - monitor UA in 1 year, consider cystoscopy if persists  - smoking cessation encouraged    5  Prostate cancer screening  - HENRY smooth no nodule  - +FHx father diagnosed age 61,  intraop RALP  - no prior PSA, screening labs due 2 weeks    Call in 2 weeks with PSA results and viagra efficacy/refills  RTC 1 year psa/henry        History of Present Illness  Dima Hoyos is a 48 y o  male here for evaluation of BPH, ED, testicular pain/minor injury, microhematuria, prostate cancer screening  Urinary flow is much improved and nocturia is nearly eliminated  Frequency is now q3 hours instead of q1 hr while at work  He is thrilled with response to flomax  No gross hematuria  Had one abnormal UA at time of testicle pain after his dog jumped on his lap  No recurrence of testicle pain since that visit  Also mentioned father had prostate cancer diagnosed age 61 and  during RALP from arterial injury  He has not had prior screening to date  Also mentions poor erections for past 5-7 years  Seemed a sudden decline after using lithium and prozac for bipolar depression and did not recover after coming off those medications  Can achieve about 25% erections  No prior PDE5i trials  He is a 1ppd smoker as well  Review of Systems   Constitutional: Negative for activity change, appetite change, chills, fever and unexpected weight change  HENT: Negative  Respiratory: Negative  Negative for shortness of breath  Cardiovascular: Negative  Negative for chest pain  Gastrointestinal: Negative for abdominal pain, diarrhea, nausea and vomiting  Endocrine: Negative  Genitourinary: Negative for decreased urine volume, difficulty urinating, dysuria, flank pain, frequency, hematuria, penile pain, scrotal swelling, testicular pain and urgency  Musculoskeletal: Negative for back pain and gait problem  Skin: Negative  Allergic/Immunologic: Negative  Neurological: Negative  Hematological: Negative for adenopathy  Does not bruise/bleed easily  Urinary Incontinence Screening      Most Recent Value   Urinary Incontinence   Urinary Incontinence? No   Incomplete emptying? No   Urinary frequency? Yes   Urinary urgency? Yes   Urinary hesitancy? No   Dysuria (painful difficult urination)? No   Nocturia (waking up to use the bathroom)? Yes [2-3 times per night]   Straining (having to push to go)? No   Weak stream?  No   Intermittent stream?  No   Post void dribbling? No               Vitals  Vitals:    03/23/21 1119   BP: 124/80   Weight: 109 kg (240 lb 4 8 oz)   Height: 5' 8" (1 727 m)       Physical Exam  Vitals signs and nursing note reviewed  Constitutional:       General: He is not in acute distress  Appearance: Normal appearance  He is well-developed  He is obese  He is not ill-appearing or diaphoretic  HENT:      Head: Normocephalic and atraumatic  Pulmonary:      Effort: Pulmonary effort is normal       Comments: No cough or audible wheeze  Abdominal:      General: There is no distension  Tenderness: There is no abdominal tenderness  There is no right CVA tenderness or left CVA tenderness  Genitourinary:     Comments: Circumcised penis, normal phallus, orthotopic patent meatus  Testes smooth descended bilaterally into the scrotum nontender with no palpable mass  Digital rectal exam smooth prostate, without appreciable nodule, induration or asymmetry  Musculoskeletal:      Right lower leg: No edema  Left lower leg: No edema  Skin:     General: Skin is warm and dry     Neurological:      Mental Status: He is alert and oriented to person, place, and time  Gait: Gait normal    Psychiatric:         Speech: Speech normal          Behavior: Behavior normal            Past History  History reviewed  No pertinent past medical history  Social History     Socioeconomic History    Marital status: /Civil Union     Spouse name: None    Number of children: None    Years of education: None    Highest education level: None   Occupational History    None   Social Needs    Financial resource strain: None    Food insecurity     Worry: None     Inability: None    Transportation needs     Medical: None     Non-medical: None   Tobacco Use    Smoking status: Current Every Day Smoker    Smokeless tobacco: Never Used   Substance and Sexual Activity    Alcohol use: Yes     Frequency: Monthly or less     Drinks per session: 1 or 2    Drug use: No    Sexual activity: Yes   Lifestyle    Physical activity     Days per week: None     Minutes per session: None    Stress: None   Relationships    Social connections     Talks on phone: None     Gets together: None     Attends Protestant service: None     Active member of club or organization: None     Attends meetings of clubs or organizations: None     Relationship status: None    Intimate partner violence     Fear of current or ex partner: None     Emotionally abused: None     Physically abused: None     Forced sexual activity: None   Other Topics Concern    None   Social History Narrative    None     Social History     Tobacco Use   Smoking Status Current Every Day Smoker   Smokeless Tobacco Never Used     Family History   Problem Relation Age of Onset    Cancer Mother     Cancer Father        The following portions of the patient's history were reviewed and updated as appropriate: allergies, current medications, past medical history, past social history, past surgical history and problem list     Results  No results found for this or any previous visit (from the past 1 hour(s))  ]  No results found for: PSA  No results found for: GLUCOSE, CALCIUM, NA, K, CO2, CL, BUN, CREATININE  No results found for: WBC, HGB, HCT, MCV, PLT 18-Jun-2018 22:33

## 2021-05-06 DIAGNOSIS — N40.1 BENIGN PROSTATIC HYPERPLASIA WITH URINARY FREQUENCY: ICD-10-CM

## 2021-05-06 DIAGNOSIS — R35.0 BENIGN PROSTATIC HYPERPLASIA WITH URINARY FREQUENCY: ICD-10-CM

## 2021-05-06 RX ORDER — TAMSULOSIN HYDROCHLORIDE 0.4 MG/1
0.4 CAPSULE ORAL
Qty: 90 CAPSULE | Refills: 0 | Status: SHIPPED | OUTPATIENT
Start: 2021-05-06 | End: 2021-10-02 | Stop reason: SDUPTHER

## 2021-10-02 DIAGNOSIS — N40.1 BENIGN PROSTATIC HYPERPLASIA WITH URINARY FREQUENCY: ICD-10-CM

## 2021-10-02 DIAGNOSIS — R35.0 BENIGN PROSTATIC HYPERPLASIA WITH URINARY FREQUENCY: ICD-10-CM

## 2021-10-04 RX ORDER — TAMSULOSIN HYDROCHLORIDE 0.4 MG/1
0.4 CAPSULE ORAL
Qty: 90 CAPSULE | Refills: 0 | Status: SHIPPED | OUTPATIENT
Start: 2021-10-04 | End: 2022-02-18 | Stop reason: SDUPTHER

## 2021-10-12 NOTE — TELEPHONE ENCOUNTER
Mailed recall letter for pt to schedule appt in March w/  Faisal cutler  PROVIDER:[TOKEN:[[id number]]]

## 2022-02-18 DIAGNOSIS — R35.0 BENIGN PROSTATIC HYPERPLASIA WITH URINARY FREQUENCY: ICD-10-CM

## 2022-02-18 DIAGNOSIS — N40.1 BENIGN PROSTATIC HYPERPLASIA WITH URINARY FREQUENCY: ICD-10-CM

## 2022-02-18 RX ORDER — TAMSULOSIN HYDROCHLORIDE 0.4 MG/1
0.4 CAPSULE ORAL
Qty: 90 CAPSULE | Refills: 0 | Status: SHIPPED | OUTPATIENT
Start: 2022-02-18 | End: 2022-06-02 | Stop reason: SDUPTHER

## 2022-02-18 NOTE — TELEPHONE ENCOUNTER
Called and left voicemail message for patient to return call to schedule annual f/u with AP in the [facility]

## 2022-03-11 ENCOUNTER — TELEPHONE (OUTPATIENT)
Dept: OTHER | Facility: OTHER | Age: 52
End: 2022-03-11

## 2022-03-14 NOTE — TELEPHONE ENCOUNTER
Spoke with patient and scheduled him to see Rachid De La Fuente in [facility] 4/1/22 at 10:00  Psa order mailed to patient along with appointment card

## 2022-03-25 ENCOUNTER — APPOINTMENT (OUTPATIENT)
Dept: LAB | Facility: CLINIC | Age: 52
End: 2022-03-25
Payer: COMMERCIAL

## 2022-03-25 ENCOUNTER — TELEPHONE (OUTPATIENT)
Dept: OTHER | Facility: OTHER | Age: 52
End: 2022-03-25

## 2022-03-25 DIAGNOSIS — N40.1 BENIGN PROSTATIC HYPERPLASIA WITH URINARY FREQUENCY: ICD-10-CM

## 2022-03-25 DIAGNOSIS — R35.0 BENIGN PROSTATIC HYPERPLASIA WITH URINARY FREQUENCY: ICD-10-CM

## 2022-03-25 LAB — PSA SERPL-MCNC: 0.6 NG/ML (ref 0–4)

## 2022-03-25 PROCEDURE — 36415 COLL VENOUS BLD VENIPUNCTURE: CPT

## 2022-03-25 PROCEDURE — G0103 PSA SCREENING: HCPCS

## 2022-04-01 ENCOUNTER — OFFICE VISIT (OUTPATIENT)
Dept: UROLOGY | Facility: CLINIC | Age: 52
End: 2022-04-01
Payer: COMMERCIAL

## 2022-04-01 VITALS
HEIGHT: 67 IN | SYSTOLIC BLOOD PRESSURE: 134 MMHG | DIASTOLIC BLOOD PRESSURE: 82 MMHG | HEART RATE: 88 BPM | WEIGHT: 245 LBS | BODY MASS INDEX: 38.45 KG/M2

## 2022-04-01 DIAGNOSIS — N40.1 BENIGN PROSTATIC HYPERPLASIA WITH URINARY FREQUENCY: Primary | ICD-10-CM

## 2022-04-01 DIAGNOSIS — N52.9 ERECTILE DYSFUNCTION, UNSPECIFIED ERECTILE DYSFUNCTION TYPE: ICD-10-CM

## 2022-04-01 DIAGNOSIS — R35.0 BENIGN PROSTATIC HYPERPLASIA WITH URINARY FREQUENCY: Primary | ICD-10-CM

## 2022-04-01 DIAGNOSIS — R31.29 MICROHEMATURIA: ICD-10-CM

## 2022-04-01 DIAGNOSIS — Z12.5 SCREENING FOR MALIGNANT NEOPLASM OF PROSTATE: ICD-10-CM

## 2022-04-01 LAB
BACTERIA UR QL AUTO: ABNORMAL /HPF
BILIRUB UR QL STRIP: NEGATIVE
CLARITY UR: CLEAR
COLOR UR: ABNORMAL
GLUCOSE UR STRIP-MCNC: NEGATIVE MG/DL
HGB UR QL STRIP.AUTO: ABNORMAL
KETONES UR STRIP-MCNC: NEGATIVE MG/DL
LEUKOCYTE ESTERASE UR QL STRIP: NEGATIVE
MUCOUS THREADS UR QL AUTO: ABNORMAL
NITRITE UR QL STRIP: NEGATIVE
NON-SQ EPI CELLS URNS QL MICRO: ABNORMAL /HPF
PH UR STRIP.AUTO: 6 [PH]
POST-VOID RESIDUAL VOLUME, ML POC: 46 ML
PROT UR STRIP-MCNC: ABNORMAL MG/DL
RBC #/AREA URNS AUTO: ABNORMAL /HPF
SL AMB  POCT GLUCOSE, UA: NORMAL
SL AMB LEUKOCYTE ESTERASE,UA: NORMAL
SL AMB POCT BILIRUBIN,UA: NORMAL
SL AMB POCT BLOOD,UA: NORMAL
SL AMB POCT CLARITY,UA: CLEAR
SL AMB POCT COLOR,UA: YELLOW
SL AMB POCT KETONES,UA: NORMAL
SL AMB POCT NITRITE,UA: NORMAL
SL AMB POCT PH,UA: 6
SL AMB POCT SPECIFIC GRAVITY,UA: 1.02
SL AMB POCT URINE PROTEIN: NORMAL
SL AMB POCT UROBILINOGEN: 0.2
SP GR UR STRIP.AUTO: 1.02 (ref 1–1.03)
UROBILINOGEN UR STRIP-ACNC: <2 MG/DL
WBC #/AREA URNS AUTO: ABNORMAL /HPF

## 2022-04-01 PROCEDURE — 81001 URINALYSIS AUTO W/SCOPE: CPT

## 2022-04-01 PROCEDURE — 51798 US URINE CAPACITY MEASURE: CPT

## 2022-04-01 PROCEDURE — 99213 OFFICE O/P EST LOW 20 MIN: CPT

## 2022-04-01 PROCEDURE — 81002 URINALYSIS NONAUTO W/O SCOPE: CPT

## 2022-04-01 RX ORDER — SILDENAFIL 100 MG/1
100 TABLET, FILM COATED ORAL AS NEEDED
Qty: 6 TABLET | Refills: 3 | Status: SHIPPED | OUTPATIENT
Start: 2022-04-01

## 2022-04-01 NOTE — PROGRESS NOTES
2022    Chief Complaint   Patient presents with    annual follow up       Assessment and Plan    46 y o  male manage by Dr Carol Ann Espinoza    1  BPH  · Reports significant improvement since starting Flomax 0 4 mg nightly  · Nocturia decreased from 3-4 episodes per night to 1 occurrence weekly  · AUA score 3    2  ED  · Continue Viagra 100 mg p r n  · Refill sent to pharmacy    3  Microscopic hematuria  · Urine dip in office revealed trace blood, negative leukocytes or nitrates  · Denies gross hematuria  · Send for micro, if positive will repeat UA and micro in 6 months and consider CT renal protocol  · Patient is a smoker for 30 years smoking approximately 1 pack per day  · Renal ultrasound from 2021 revealed unremarkable kidneys  4  Prostate cancer screening  · MCKAY reveals smooth symmetric prostate, no palpable nodules masses  · Positive family history, father diagnosed at age 61,  intraop RALP  · PSA 0 6 on 2022  · Repeat PSA in 1 year with annual follow-up  History of Present Illness  Elke Allen is a 46 y o  male here for annual follow-up for prostate cancer screening, BPH, and ED  Last seen by Marce Gipson and 2021  At that time he was started on tamsulosin 0 4 mg for complaints of nocturia 3-4 times per night  Since starting tamsulosin patient reports significant improvement in his urinary symptoms  He may have experience 1 episode of nocturia per week  He also reports satisfactory improvement for his erectile dysfunction since starting Viagra 100 mg p r n  Rocio Castro Patient has a positive family history of prostate cancer, his father was diagnosed at age 61,  intraop RALP  Last PSA 0 6 on 2022  MCKAY in office reveals smooth symmetric prostate no palpable nodules or masses  Urine dip in office revealed trace amount of blood  Patient have 1 prior occurrence of microscopic hematuria with 2-4 RBCs right testicular injury    He is a current smoke for about 30 years about 1 pack per day  Denies family history of bladder, kidney, or ureteral cancer  Review of Systems   Constitutional: Negative for chills and fever  HENT: Negative for congestion and sore throat  Respiratory: Negative for cough and shortness of breath  Cardiovascular: Negative for chest pain and leg swelling  Gastrointestinal: Negative for abdominal pain, constipation, diarrhea, nausea and vomiting  Genitourinary: Negative for difficulty urinating, dysuria, flank pain, frequency, hematuria, testicular pain and urgency  Musculoskeletal: Negative for back pain and gait problem  Skin: Negative for wound  Allergic/Immunologic: Negative for immunocompromised state  Hematological: Does not bruise/bleed easily  AUA SYMPTOM SCORE      Most Recent Value   AUA SYMPTOM SCORE    How often have you had a sensation of not emptying your bladder completely after you finished urinating? 1 (P)     How often have you had to urinate again less than two hours after you finished urinating? 1 (P)     How often have you found you stopped and started again several times when you urinate? 0 (P)     How often have you found it difficult to postpone urination? 0 (P)     How often have you had a weak urinary stream? 0 (P)     How often have you had to push or strain to begin urination? 0 (P)     How many times did you most typically get up to urinate from the time you went to bed at night until the time you got up in the morning? 1 (P)     Quality of Life: If you were to spend the rest of your life with your urinary condition just the way it is now, how would you feel about that? 3 (P)     AUA SYMPTOM SCORE 3 (P)           Vitals  Vitals:    04/01/22 1009   BP: 134/82   Pulse: 88   Weight: 111 kg (245 lb)   Height: 5' 7" (1 702 m)       Physical Exam  Vitals reviewed  Constitutional:       General: He is not in acute distress  Appearance: Normal appearance  He is obese   He is not ill-appearing or toxic-appearing  HENT:      Head: Normocephalic and atraumatic  Eyes:      General: No scleral icterus  Conjunctiva/sclera: Conjunctivae normal    Cardiovascular:      Rate and Rhythm: Normal rate  Pulmonary:      Effort: Pulmonary effort is normal  No respiratory distress  Abdominal:      General: Bowel sounds are normal       Palpations: Abdomen is soft  Tenderness: There is no abdominal tenderness  There is no right CVA tenderness or left CVA tenderness  Hernia: No hernia is present  Genitourinary:     Comments: MCKAY reveals smooth symmetric prostate, no palpable nodules masses  Estimated prostate gland size is between 35-40 g  Musculoskeletal:         General: Normal range of motion  Cervical back: Normal range of motion  Right lower leg: No edema  Left lower leg: No edema  Skin:     General: Skin is warm and dry  Coloration: Skin is not jaundiced or pale  Neurological:      General: No focal deficit present  Mental Status: He is alert and oriented to person, place, and time  Mental status is at baseline  Gait: Gait normal    Psychiatric:         Mood and Affect: Mood normal          Behavior: Behavior normal          Thought Content: Thought content normal          Judgment: Judgment normal          Past History  History reviewed  No pertinent past medical history  Social History     Socioeconomic History    Marital status: /Civil Union     Spouse name: None    Number of children: None    Years of education: None    Highest education level: None   Occupational History    None   Tobacco Use    Smoking status: Current Every Day Smoker     Packs/day: 1 00    Smokeless tobacco: Never Used   Vaping Use    Vaping Use: Never used   Substance and Sexual Activity    Alcohol use:  Yes    Drug use: No    Sexual activity: Yes   Other Topics Concern    None   Social History Narrative    None     Social Determinants of Health     Financial Resource Strain: Not on file   Food Insecurity: Not on file   Transportation Needs: Not on file   Physical Activity: Not on file   Stress: Not on file   Social Connections: Not on file   Intimate Partner Violence: Not on file   Housing Stability: Not on file     Social History     Tobacco Use   Smoking Status Current Every Day Smoker    Packs/day: 1 00   Smokeless Tobacco Never Used     Family History   Problem Relation Age of Onset    Cancer Mother     Cancer Father        The following portions of the patient's history were reviewed and updated as appropriate allergies, current medications, past medical history, past social history, past surgical history and problem list    Imaging:    Results  Recent Results (from the past 1 hour(s))   POCT urine dip    Collection Time: 04/01/22 10:15 AM   Result Value Ref Range    LEUKOCYTE ESTERASE,UA -     NITRITE,UA -     SL AMB POCT UROBILINOGEN 0 2     POCT URINE PROTEIN +      PH,UA 6 0     BLOOD,UA +     SPECIFIC GRAVITY,UA 1 020     KETONES,UA -     BILIRUBIN,UA -     GLUCOSE, UA -      COLOR,UA yellow     CLARITY,UA clear    POCT Measure PVR    Collection Time: 04/01/22 10:16 AM   Result Value Ref Range    POST-VOID RESIDUAL VOLUME, ML POC 46 mL   ]  Lab Results   Component Value Date    PSA 0 6 03/25/2022     No results found for: GLUCOSE, CALCIUM, NA, K, CO2, CL, BUN, CREATININE  No results found for: WBC, HGB, HCT, MCV, PLT    JOLYNN Michelle

## 2022-06-02 ENCOUNTER — APPOINTMENT (OUTPATIENT)
Dept: RADIOLOGY | Facility: CLINIC | Age: 52
End: 2022-06-02
Payer: COMMERCIAL

## 2022-06-02 ENCOUNTER — OFFICE VISIT (OUTPATIENT)
Dept: URGENT CARE | Facility: CLINIC | Age: 52
End: 2022-06-02
Payer: COMMERCIAL

## 2022-06-02 VITALS
BODY MASS INDEX: 37.2 KG/M2 | RESPIRATION RATE: 20 BRPM | HEART RATE: 78 BPM | TEMPERATURE: 98 F | SYSTOLIC BLOOD PRESSURE: 130 MMHG | DIASTOLIC BLOOD PRESSURE: 80 MMHG | OXYGEN SATURATION: 96 % | HEIGHT: 67 IN | WEIGHT: 237 LBS

## 2022-06-02 DIAGNOSIS — N40.1 BENIGN PROSTATIC HYPERPLASIA WITH URINARY FREQUENCY: ICD-10-CM

## 2022-06-02 DIAGNOSIS — J02.8 ACUTE PHARYNGITIS DUE TO OTHER SPECIFIED ORGANISMS: ICD-10-CM

## 2022-06-02 DIAGNOSIS — R35.0 BENIGN PROSTATIC HYPERPLASIA WITH URINARY FREQUENCY: ICD-10-CM

## 2022-06-02 DIAGNOSIS — J20.8 ACUTE BRONCHITIS DUE TO OTHER SPECIFIED ORGANISMS: Primary | ICD-10-CM

## 2022-06-02 DIAGNOSIS — R05.1 ACUTE COUGH: ICD-10-CM

## 2022-06-02 DIAGNOSIS — Z72.0 TOBACCO USE: ICD-10-CM

## 2022-06-02 LAB — S PYO AG THROAT QL: NEGATIVE

## 2022-06-02 PROCEDURE — 71046 X-RAY EXAM CHEST 2 VIEWS: CPT

## 2022-06-02 PROCEDURE — 99213 OFFICE O/P EST LOW 20 MIN: CPT | Performed by: NURSE PRACTITIONER

## 2022-06-02 PROCEDURE — 87880 STREP A ASSAY W/OPTIC: CPT | Performed by: NURSE PRACTITIONER

## 2022-06-02 RX ORDER — AZITHROMYCIN 250 MG/1
TABLET, FILM COATED ORAL
Qty: 6 TABLET | Refills: 0 | Status: SHIPPED | OUTPATIENT
Start: 2022-06-02 | End: 2022-06-06

## 2022-06-02 RX ORDER — TAMSULOSIN HYDROCHLORIDE 0.4 MG/1
0.4 CAPSULE ORAL
Qty: 90 CAPSULE | Refills: 0 | Status: SHIPPED | OUTPATIENT
Start: 2022-06-02

## 2022-06-02 RX ORDER — ALBUTEROL SULFATE 90 UG/1
2 AEROSOL, METERED RESPIRATORY (INHALATION) EVERY 6 HOURS PRN
Qty: 8.5 G | Refills: 0 | Status: SHIPPED | OUTPATIENT
Start: 2022-06-02

## 2022-06-02 NOTE — PROGRESS NOTES
[facility]        NAME: Zuri Leon is a 46 y o  male  : 1970    MRN: [id number]  DATE: 2022  TIME: 11:11 AM    Assessment and Plan   Acute bronchitis due to other specified organisms [J20 8]  1  Acute bronchitis due to other specified organisms  azithromycin (ZITHROMAX) 250 mg tablet    albuterol (ProAir HFA) 90 mcg/act inhaler   2  Acute cough  XR chest pa & lateral    azithromycin (ZITHROMAX) 250 mg tablet    albuterol (ProAir HFA) 90 mcg/act inhaler   3  Acute pharyngitis due to other specified organisms  POCT rapid strepA    azithromycin (ZITHROMAX) 250 mg tablet   4  Tobacco use           Patient Instructions       Follow up with PCP in 3-5 days  Proceed to  ER if symptoms worsen  You have been prescribed azithromycin - take as prescribed  Use robitussin DM for cough  Drink water  Take claritin or zyrtec daily for allergies  Follow up with your PCp  Go to the ED if symptoms worsen        Chief Complaint     Chief Complaint   Patient presents with    Cough     X 3 weeks     Earache     Left ear pain     Sore Throat     X 3 weeks          History of Present Illness       This is a 46year old male who smokes and states has been coughing with yellow phlegm in the AM, left ear ache, sorethroat x 3 weeks  He states his wife had similar x 6 weeks  States has been taking OTC products w/o relief  + smoker  Denies fevers, chills, n/v/d  Denies covid exposure  Review of Systems   Review of Systems   Constitutional: Negative  HENT: Positive for congestion, ear pain and sore throat  Eyes: Negative  Respiratory: Positive for cough  Cardiovascular: Negative  Gastrointestinal: Negative  Endocrine: Negative  Genitourinary: Negative  Musculoskeletal: Negative  Skin: Negative  Allergic/Immunologic: Negative  Neurological: Negative  Hematological: Negative  Psychiatric/Behavioral: Negative            Current Medications       Current Outpatient Medications:     albuterol (ProAir HFA) 90 mcg/act inhaler, Inhale 2 puffs every 6 (six) hours as needed for wheezing or shortness of breath, Disp: 8 5 g, Rfl: 0    azithromycin (ZITHROMAX) 250 mg tablet, Take 2 tablets today then 1 tablet daily x 4 days, Disp: 6 tablet, Rfl: 0    tamsulosin (FLOMAX) 0 4 mg, Take 1 capsule (0 4 mg total) by mouth daily with dinner, Disp: 90 capsule, Rfl: 0    albuterol (Ventolin HFA) 90 mcg/act inhaler, Inhale 1 puff every 4 (four) hours as needed, Disp: , Rfl:     azelastine (OPTIVAR) 0 05 % ophthalmic solution, Administer 1 drop to both eyes 2 (two) times a day, Disp: 6 mL, Rfl: 2    sildenafil (VIAGRA) 100 mg tablet, Take 1 tablet (100 mg total) by mouth as needed for erectile dysfunction Take 1 tablet by mouth 1 hour prior to sex on empty stomach, Disp: 6 tablet, Rfl: 3    Current Allergies     Allergies as of 06/02/2022    (No Known Allergies)            The following portions of the patient's history were reviewed and updated as appropriate: allergies, current medications, past family history, past medical history, past social history, past surgical history and problem list      History reviewed  No pertinent past medical history  History reviewed  No pertinent surgical history  Family History   Problem Relation Age of Onset    Cancer Mother     Cancer Father          Medications have been verified  Objective   /80   Pulse 78   Temp 98 °F (36 7 °C)   Resp 20   Ht 5' 7" (1 702 m)   Wt 108 kg (237 lb)   SpO2 96%   BMI 37 12 kg/m²   No LMP for male patient  Physical Exam     Physical Exam  Vitals and nursing note reviewed  Constitutional:       General: He is not in acute distress  Appearance: He is well-developed  He is obese  He is not ill-appearing or diaphoretic  HENT:      Head: Normocephalic and atraumatic        Right Ear: Tympanic membrane normal       Left Ear: Tympanic membrane and ear canal normal       Ears: Comments: Right canal slightly red      Nose: Congestion and rhinorrhea present  Mouth/Throat:      Mouth: Mucous membranes are moist  No oral lesions  Pharynx: Oropharynx is clear  Uvula midline  No oropharyngeal exudate, posterior oropharyngeal erythema or uvula swelling  Comments: + PND   Eyes:      Conjunctiva/sclera: Conjunctivae normal       Pupils: Pupils are equal, round, and reactive to light  Cardiovascular:      Rate and Rhythm: Normal rate and regular rhythm  Heart sounds: Normal heart sounds  Pulmonary:      Effort: Pulmonary effort is normal  No respiratory distress  Breath sounds: No stridor  Wheezing and rhonchi present  No rales  Chest:      Chest wall: No tenderness  Abdominal:      General: Bowel sounds are normal       Palpations: Abdomen is soft  Musculoskeletal:      Cervical back: Normal range of motion and neck supple  Skin:     General: Skin is warm and dry  Capillary Refill: Capillary refill takes less than 2 seconds  Neurological:      General: No focal deficit present  Mental Status: He is alert and oriented to person, place, and time  Psychiatric:         Mood and Affect: Mood normal          Behavior: Behavior normal          Preliminary reading CXR  ?  Consolidation B/L LL vs bronchial enlargement  Waiting on rad read  Will tx with Zpack

## 2022-06-02 NOTE — PATIENT INSTRUCTIONS
You have been prescribed azithromycin - take as prescribed  Use robitussin DM for cough  Drink water    Take claritin or zyrtec daily for allergies  Follow up with your PCp  Go to the ED if symptoms worsen

## 2022-08-29 ENCOUNTER — OFFICE VISIT (OUTPATIENT)
Dept: URGENT CARE | Facility: CLINIC | Age: 52
End: 2022-08-29
Payer: COMMERCIAL

## 2022-08-29 VITALS
RESPIRATION RATE: 20 BRPM | HEART RATE: 78 BPM | DIASTOLIC BLOOD PRESSURE: 78 MMHG | SYSTOLIC BLOOD PRESSURE: 132 MMHG | TEMPERATURE: 98 F | BODY MASS INDEX: 36.37 KG/M2 | HEIGHT: 68 IN | OXYGEN SATURATION: 98 % | WEIGHT: 240 LBS

## 2022-08-29 DIAGNOSIS — L03.818 CELLULITIS OF OTHER SPECIFIED SITE: Primary | ICD-10-CM

## 2022-08-29 DIAGNOSIS — L08.9 INFECTED CYST OF SKIN: ICD-10-CM

## 2022-08-29 DIAGNOSIS — L72.9 INFECTED CYST OF SKIN: ICD-10-CM

## 2022-08-29 PROCEDURE — 99213 OFFICE O/P EST LOW 20 MIN: CPT | Performed by: NURSE PRACTITIONER

## 2022-08-29 RX ORDER — CEPHALEXIN 500 MG/1
500 CAPSULE ORAL EVERY 8 HOURS SCHEDULED
Qty: 21 CAPSULE | Refills: 0 | Status: SHIPPED | OUTPATIENT
Start: 2022-08-29 | End: 2022-09-05

## 2022-08-29 NOTE — PATIENT INSTRUCTIONS
You have lexii prescribed cephalexin for infection - take all the medication as prescribed  Apply hot-warm compresses several times a day     Take tylenol or motrin for pain  Follow up with general surgery for removal  Follow up with your PCP   Go to the ED if symptoms worsen

## 2022-08-29 NOTE — PROGRESS NOTES
[facility]        NAME: Christine Antoine is a 46 y o  male  : 1970    MRN: [id number]  DATE: 2022  TIME: 10:56 AM    Assessment and Plan   Cellulitis of other specified site [L03 818]  1  Cellulitis of other specified site  Ambulatory Referral to General Surgery   2  Infected cyst of skin  cephalexin (KEFLEX) 500 mg capsule    Ambulatory Referral to General Surgery         Patient Instructions       Follow up with PCP in 3-5 days  Proceed to  ER if symptoms worsen  You have lexii prescribed cephalexin for infection - take all the medication as prescribed  Apply hot-warm compresses several times a day  Take tylenol or motrin for pain  Follow up with general surgery for removal  Follow up with your PCP   Go to the ED if symptoms worsen      Chief Complaint     Chief Complaint   Patient presents with    Cellulitis     Pt states Lump on back x several months , one week ago started with raised area and redness          History of Present Illness       This is a 46year old male who states he has had a cyst on his left shoulder for years and now has redness, a notably raise in size and some tenderness  Denies fevers, chills, n/v/d  Review of Systems   Review of Systems   Constitutional: Negative  HENT: Negative  Eyes: Negative  Respiratory: Negative  Cardiovascular: Negative  Gastrointestinal: Negative  Endocrine: Negative  Genitourinary: Negative  Musculoskeletal: Negative  Skin: Positive for wound  Allergic/Immunologic: Negative  Neurological: Negative  Hematological: Negative  Psychiatric/Behavioral: Negative  Current Medications       Current Outpatient Medications:     cephalexin (KEFLEX) 500 mg capsule, Take 1 capsule (500 mg total) by mouth every 8 (eight) hours for 7 days, Disp: 21 capsule, Rfl: 0    tamsulosin (FLOMAX) 0 4 mg, TAKE 1 CAPSULE DAILY AT DINNER   , Disp: 30 capsule, Rfl: 11    albuterol (ProAir HFA) 90 mcg/act inhaler, Inhale 2 puffs every 6 (six) hours as needed for wheezing or shortness of breath, Disp: 8 5 g, Rfl: 0    albuterol (Ventolin HFA) 90 mcg/act inhaler, Inhale 1 puff every 4 (four) hours as needed, Disp: , Rfl:     azelastine (OPTIVAR) 0 05 % ophthalmic solution, Administer 1 drop to both eyes 2 (two) times a day, Disp: 6 mL, Rfl: 2    sildenafil (VIAGRA) 100 mg tablet, Take 1 tablet (100 mg total) by mouth as needed for erectile dysfunction Take 1 tablet by mouth 1 hour prior to sex on empty stomach, Disp: 6 tablet, Rfl: 3    Current Allergies     Allergies as of 08/29/2022    (No Known Allergies)            The following portions of the patient's history were reviewed and updated as appropriate: allergies, current medications, past family history, past medical history, past social history, past surgical history and problem list      History reviewed  No pertinent past medical history  History reviewed  No pertinent surgical history  Family History   Problem Relation Age of Onset    Cancer Mother     Cancer Father          Medications have been verified  Objective   /78   Pulse 78   Temp 98 °F (36 7 °C)   Resp 20   Ht 5' 8" (1 727 m)   Wt 109 kg (240 lb)   SpO2 98%   BMI 36 49 kg/m²   No LMP for male patient  Physical Exam     Physical Exam  Vitals and nursing note reviewed  Constitutional:       General: He is not in acute distress  Appearance: Normal appearance  He is obese  He is not ill-appearing, toxic-appearing or diaphoretic  HENT:      Head: Normocephalic and atraumatic  Eyes:      Extraocular Movements: Extraocular movements intact  Cardiovascular:      Rate and Rhythm: Normal rate and regular rhythm  Pulses: Normal pulses  Heart sounds: Normal heart sounds  Pulmonary:      Effort: Pulmonary effort is normal       Breath sounds: Normal breath sounds  Abdominal:      General: There is no distension        Palpations: Abdomen is soft       Tenderness: There is no abdominal tenderness  Musculoskeletal:         General: Normal range of motion  Cervical back: Normal range of motion and neck supple  Skin:     General: Skin is warm and dry  Capillary Refill: Capillary refill takes less than 2 seconds  Findings: Erythema present  Neurological:      General: No focal deficit present  Mental Status: He is alert and oriented to person, place, and time  Psychiatric:         Mood and Affect: Mood normal          Behavior: Behavior normal          Thought Content:  Thought content normal          Judgment: Judgment normal

## 2022-09-02 ENCOUNTER — CONSULT (OUTPATIENT)
Dept: SURGERY | Facility: CLINIC | Age: 52
End: 2022-09-02
Payer: COMMERCIAL

## 2022-09-02 VITALS
BODY MASS INDEX: 36.95 KG/M2 | HEART RATE: 80 BPM | RESPIRATION RATE: 18 BRPM | OXYGEN SATURATION: 96 % | TEMPERATURE: 97.1 F | WEIGHT: 243 LBS | DIASTOLIC BLOOD PRESSURE: 80 MMHG | SYSTOLIC BLOOD PRESSURE: 118 MMHG

## 2022-09-02 DIAGNOSIS — L72.9 INFECTED CYST OF SKIN: Primary | ICD-10-CM

## 2022-09-02 DIAGNOSIS — L03.818 CELLULITIS OF OTHER SPECIFIED SITE: ICD-10-CM

## 2022-09-02 DIAGNOSIS — L08.9 INFECTED CYST OF SKIN: Primary | ICD-10-CM

## 2022-09-02 PROCEDURE — 87205 SMEAR GRAM STAIN: CPT | Performed by: PHYSICIAN ASSISTANT

## 2022-09-02 PROCEDURE — 87077 CULTURE AEROBIC IDENTIFY: CPT | Performed by: PHYSICIAN ASSISTANT

## 2022-09-02 PROCEDURE — 88304 TISSUE EXAM BY PATHOLOGIST: CPT | Performed by: PATHOLOGY

## 2022-09-02 PROCEDURE — 99203 OFFICE O/P NEW LOW 30 MIN: CPT | Performed by: PHYSICIAN ASSISTANT

## 2022-09-02 PROCEDURE — 10060 I&D ABSCESS SIMPLE/SINGLE: CPT | Performed by: PHYSICIAN ASSISTANT

## 2022-09-02 PROCEDURE — 87186 SC STD MICRODIL/AGAR DIL: CPT | Performed by: PHYSICIAN ASSISTANT

## 2022-09-02 PROCEDURE — 87070 CULTURE OTHR SPECIMN AEROBIC: CPT | Performed by: PHYSICIAN ASSISTANT

## 2022-09-02 NOTE — PROGRESS NOTES
H&P Exam - General Surgery   Romayne Felipe Monroe 46 y o  male MRN: [id number]   Encounter: [id number]    Assessment/Plan    Infected cyst of skin  Mr Noé Sandoval presents with signs and symptoms of an abscess epidermoid cyst of his upper left back  I have advised I&D and debridement in the office today  Verbal consent obtained  Procedure completed, wound culture taken, skin excised in ellipse and sent to pathology  Wound was hemostatic at procedure end  It was packed with neosporin and 2x2 gauze with large island dressing on top  Wound care instructions reviewed for packing removal tomorrow and daily wound care with soap/water scrub and neosporin/bandaid  Will see back in 1 week for recheck  Questions answered, agreeable to plan  Diagnoses and all orders for this visit:    Infected cyst of skin  -     Ambulatory Referral to General Surgery    Cellulitis of other specified site  -     Ambulatory Referral to General Surgery        History of Present Illness   Chief Complaint   Patient presents with    Consult     cyst on back     Pt is coming the office for a cyst on the upper back never opened can be very painful at times and he states that he has had it for a little over a week and no fever/chills  Marquis Lebron Porter 45 yo M here for evaluation of infected cyst on his back  Symptoms x 1 week  No active drainage  Started on oral abx per PCP, but symptoms worsening  No previous cysts to his knowledge  Denies allergies or use of blood thinners  Review of Systems   Skin: Positive for color change and wound  Historical Information   History reviewed  No pertinent past medical history  History reviewed  No pertinent surgical history    Social History   Social History     Substance and Sexual Activity   Alcohol Use Yes     Social History     Substance and Sexual Activity   Drug Use No     Social History     Tobacco Use   Smoking Status Current Every Day Smoker    Packs/day: 1 00   Smokeless Tobacco Never Used     Family History   Problem Relation Age of Onset    Cancer Mother     Cancer Father        Meds/Allergies     Current Outpatient Medications:     albuterol (ProAir HFA) 90 mcg/act inhaler, Inhale 2 puffs every 6 (six) hours as needed for wheezing or shortness of breath, Disp: 8 5 g, Rfl: 0    cephalexin (KEFLEX) 500 mg capsule, Take 1 capsule (500 mg total) by mouth every 8 (eight) hours for 7 days, Disp: 21 capsule, Rfl: 0    sildenafil (VIAGRA) 100 mg tablet, Take 1 tablet (100 mg total) by mouth as needed for erectile dysfunction Take 1 tablet by mouth 1 hour prior to sex on empty stomach, Disp: 6 tablet, Rfl: 3    tamsulosin (FLOMAX) 0 4 mg, TAKE 1 CAPSULE DAILY AT DINNER   , Disp: 30 capsule, Rfl: 11  No Known Allergies    The following portions of the patient's history were reviewed and updated as appropriate: allergies, current medications, past family history, past medical history, past social history, past surgical history and problem list     Objective   Current Vitals:   Blood pressure 118/80, pulse 80, temperature (!) 97 1 °F (36 2 °C), temperature source Temporal, resp  rate 18, weight 110 kg (243 lb), SpO2 96 %  Physical Exam  Constitutional:       General: He is not in acute distress  Appearance: He is well-developed  He is not diaphoretic  HENT:      Head: Normocephalic and atraumatic  Eyes:      Pupils: Pupils are equal, round, and reactive to light  Pulmonary:      Effort: No respiratory distress  Musculoskeletal:         General: Normal range of motion  Cervical back: Normal range of motion  Skin:     General: Skin is warm and dry  Comments: 4 cm abscessed epidermoid cyst of the skin, no active drainage, skin breakdown in process  Neurological:      Mental Status: He is alert and oriented to person, place, and time         Incision and Drainage    Date/Time: 9/2/2022 9:16 AM  Performed by: Ben Oreilly PA-C  Authorized by: Stephen Oreilly PA-C   Universal Protocol:  Consent: Verbal consent obtained  Risks and benefits: risks, benefits and alternatives were discussed  Consent given by: patient  Patient understanding: patient states understanding of the procedure being performed  Patient identity confirmed: verbally with patient      Patient location:  Clinic  Location:     Type:  Abscess and cyst    Size:  4    Location:  Trunk    Trunk location:  Back  Pre-procedure details:     Skin preparation:  Betadine  Anesthesia (see MAR for exact dosages): Anesthesia method:  Local infiltration    Local anesthetic:  Lidocaine 1% WITH epi  Procedure details:     Complexity:  Intermediate    Incision types:  Elliptical    Scalpel blade:  15    Approach:  Open    Incision depth:  Subcutaneous    Wound management:  Probed and deloculated, irrigated with saline, debrided and extensive cleaning    Irrigation with saline:  10    Hemostat:  Good    Drainage:  Purulent    Drainage amount: Moderate    Wound treatment:  Packing placed    Packing material: 2x2 gauze  Post-procedure details:     Patient tolerance of procedure:   Tolerated well, no immediate complications        Signature:  Stephen Oreilly PA-C  Date: 9/2/2022 Time: 9:15 AM

## 2022-09-02 NOTE — ASSESSMENT & PLAN NOTE
Mr Maira Luisa Vicente presents with signs and symptoms of an abscess epidermoid cyst of his upper left back  I have advised I&D and debridement in the office today  Verbal consent obtained  Procedure completed, wound culture taken, skin excised in ellipse and sent to pathology  Wound was hemostatic at procedure end  It was packed with neosporin and 2x2 gauze with large island dressing on top  Wound care instructions reviewed for packing removal tomorrow and daily wound care with soap/water scrub and neosporin/bandaid  Will see back in 1 week for recheck  Questions answered, agreeable to plan

## 2022-09-02 NOTE — LETTER
September 2, 2022     Patient: Óscar Osuna  YOB: 1970  Date of Visit: 9/2/2022      To Whom it May Concern:    Óscar Osuna is under my professional care  Lindy Fonseca was seen in my office on 9/2/2022  Lindy Fonseca requires ongoing medical care  Please excuse him from work over the next two weeks  He will return for re-assessment to determine if additional time off work is required  If you have any questions or concerns, please don't hesitate to call           Sincerely,          Stephen Oreilly PA-C        CC: No Recipients

## 2022-09-04 LAB
BACTERIA WND AEROBE CULT: ABNORMAL
GRAM STN SPEC: ABNORMAL

## 2022-09-05 LAB
BACTERIA WND AEROBE CULT: ABNORMAL
GRAM STN SPEC: ABNORMAL

## 2022-09-09 ENCOUNTER — OFFICE VISIT (OUTPATIENT)
Dept: SURGERY | Facility: CLINIC | Age: 52
End: 2022-09-09

## 2022-09-09 VITALS
RESPIRATION RATE: 20 BRPM | DIASTOLIC BLOOD PRESSURE: 68 MMHG | HEART RATE: 86 BPM | BODY MASS INDEX: 36.83 KG/M2 | SYSTOLIC BLOOD PRESSURE: 104 MMHG | TEMPERATURE: 97.9 F | WEIGHT: 243 LBS | HEIGHT: 68 IN | OXYGEN SATURATION: 95 %

## 2022-09-09 DIAGNOSIS — L08.9 INFECTED CYST OF SKIN: Primary | ICD-10-CM

## 2022-09-09 DIAGNOSIS — L72.9 INFECTED CYST OF SKIN: Primary | ICD-10-CM

## 2022-09-09 PROCEDURE — 99024 POSTOP FOLLOW-UP VISIT: CPT | Performed by: PHYSICIAN ASSISTANT

## 2022-09-09 NOTE — ASSESSMENT & PLAN NOTE
Now 1 week after I&D the wound is open and clean without infection and Jerome Aguiar reports no pain  With the assistance of his wife they are tending to the wound twice daily  On exam there remains significant tunneling at 12 o'clock  The possibility and likelihood of residual cyst wall remnants and potential delayed healing from this was explained  Will continue with local wound care alone for now and monitor the wound's progress over the next few weeks  Out of an abundance of caution we will request Jerome Aguiar be excused from work until more progress has been made in the wound healing  He is concerned that the dirty nature of his work environment would result in wound contamination/infection despite being covered with simple bandages  All questions answered, agreeable to plan

## 2022-09-09 NOTE — LETTER
September 9, 2022     Patient: Benjie Bolton  YOB: 1970  Date of Visit: 9/9/2022      To Whom it May Concern:    Benjie Bolton is under my professional care  Dl Vargas was seen in my office on 9/9/2022  Please excuse him from work through 9/30/2022 out of concern regarding the presence of an open wound in his work setting  He will be in for re-check prior to this date to assess wound healing  If you have any questions or concerns, please don't hesitate to call           Sincerely,          Stephen Oreilly PA-C        CC: No Recipients

## 2022-09-09 NOTE — PROGRESS NOTES
Post-Op Note - General Surgery   Dl Monroe 46 y o  male MRN: [id number]  Encounter: [id number]    Assessment/Plan    Infected cyst of skin  Now 1 week after I&D the wound is open and clean without infection and Tamar Saunders reports no pain  With the assistance of his wife they are tending to the wound twice daily  On exam there remains significant tunneling at 12 o'clock  The possibility and likelihood of residual cyst wall remnants and potential delayed healing from this was explained  Will continue with local wound care alone for now and monitor the wound's progress over the next few weeks  Out of an abundance of caution we will request Tamar Saunders be excused from work until more progress has been made in the wound healing  He is concerned that the dirty nature of his work environment would result in wound contamination/infection despite being covered with simple bandages  All questions answered, agreeable to plan  There are no diagnoses linked to this encounter  Subjective      Chief Complaint   Patient presents with    Post-op     po I&D 09/02/22 on cyst of the back/ wound check      Patient came in today for a po I&D 09/02/22 on cyst of the back/ wound check  Patient states the wound looks fine today but yesterday the patient and his wife states the wound looked different as if the cyst was growing back  Theres still drainage and the wound itches  No fevers or chills  JULITA Sneed 47 yo M here for wound check after I&D infected cyst  Reports no pain after initial packing removal  His wife is assisting with wound care twice daily to include topical neosporin and dressing changes  Antibiotics completed  Review of Systems   Skin: Positive for wound         The following portions of the patient's history were reviewed and updated as appropriate: allergies, current medications, past family history, past medical history, past social history, past surgical history and problem list     Objective Blood pressure 104/68, pulse 86, temperature 97 9 °F (36 6 °C), temperature source Temporal, resp  rate 20, height 5' 8" (1 727 m), weight 110 kg (243 lb), SpO2 95 %  Physical Exam  Vitals and nursing note reviewed  Constitutional:       General: He is not in acute distress  Appearance: He is well-developed  He is not diaphoretic  HENT:      Head: Normocephalic and atraumatic  Eyes:      Conjunctiva/sclera: Conjunctivae normal       Pupils: Pupils are equal, round, and reactive to light  Pulmonary:      Effort: No respiratory distress  Musculoskeletal:         General: Normal range of motion  Cervical back: Normal range of motion  Skin:     General: Skin is warm and dry  Capillary Refill: Capillary refill takes less than 2 seconds  Comments: Wound is open and clean  Mild surrounding irritation from bandages  Wound tunnels at 12 o'clock position about 3 cm  No visible cyst wall remnants  Neurological:      Mental Status: He is alert and oriented to person, place, and time     Psychiatric:         Behavior: Behavior normal          Signature:  Stephen Oreilly PA-C  Date: 9/9/2022 Time: 10:58 AM

## 2022-09-26 ENCOUNTER — OFFICE VISIT (OUTPATIENT)
Dept: SURGERY | Facility: CLINIC | Age: 52
End: 2022-09-26
Payer: COMMERCIAL

## 2022-09-26 VITALS — TEMPERATURE: 97.8 F

## 2022-09-26 DIAGNOSIS — L08.9 INFECTED CYST OF SKIN: Primary | ICD-10-CM

## 2022-09-26 DIAGNOSIS — L72.9 INFECTED CYST OF SKIN: Primary | ICD-10-CM

## 2022-09-26 PROCEDURE — 99213 OFFICE O/P EST LOW 20 MIN: CPT | Performed by: SURGERY

## 2022-09-26 NOTE — LETTER
September 26, 2022     Emily Pace, [address] [address]    Patient: Jenn Mckeon   YOB: 1970   Date of Visit: 9/26/2022       Dear Dr Dylan Mcrae:    Thank you for referring Jenn Mckeon to me for evaluation  Below are my notes for this consultation  If you have questions, please do not hesitate to call me  I look forward to following your patient along with you  Sincerely,        Scottie Denver, MD        CC: No Recipients  Scottie Denver, MD  9/26/2022 10:23 AM  Incomplete  Assessment/Plan:    Infected cyst of skin  Patient returns for routine scheduled follow-up status post incision and drainage of an infected epidermoid cyst of the posterior left shoulder  Today patient voiced no complaints referable to the wound  On physical examination the patient is well appearing  Awake alert oriented  Pleasant competent reliable as a historian  The surgical wound clean dry and intact  Fully epithelialized  Patient appears well  No additional investigations or interventions are indicated at the present time  The patient has been encouraged to follow up with the practice at any point future with questions or concerns  Subjective:      Patient ID: Jenn Mckeon is a 46 y o  male  Pt is coming in the office today for 2 wk f/u I&D 09/02/22 on cyst of the back/ wound check  Wound looks good no pain, fever/chills FINA Garcia MA      The following portions of the patient's history were reviewed and updated as appropriate: allergies, current medications, past family history, past medical history, past social history, past surgical history and problem list     Review of Systems   Constitutional: Negative for chills and fever  HENT: Negative for ear pain and sore throat  Eyes: Negative for pain and visual disturbance  Respiratory: Negative for cough and shortness of breath      Cardiovascular: Negative for chest pain and palpitations  Gastrointestinal: Negative for abdominal pain and vomiting  Genitourinary: Negative for dysuria and hematuria  Musculoskeletal: Negative for arthralgias and back pain  Skin: Negative for color change and rash  Neurological: Negative for seizures and syncope  All other systems reviewed and are negative  Objective:      Temp 97 8 °F (36 6 °C) (Temporal)          Physical Exam  Vitals and nursing note reviewed  Constitutional:       Appearance: He is well-developed  HENT:      Head: Normocephalic and atraumatic  Eyes:      Conjunctiva/sclera: Conjunctivae normal       Pupils: Pupils are equal, round, and reactive to light  Cardiovascular:      Rate and Rhythm: Normal rate and regular rhythm  Pulmonary:      Effort: Pulmonary effort is normal       Breath sounds: Normal breath sounds  Abdominal:      General: Bowel sounds are normal       Palpations: Abdomen is soft  Musculoskeletal:         General: Normal range of motion  Cervical back: Normal range of motion and neck supple  Skin:     General: Skin is warm and dry  Comments: Wound clean dry intact  No evidence of residual cyst   Neurological:      Mental Status: He is alert and oriented to person, place, and time  Psychiatric:         Behavior: Behavior normal          Thought Content:  Thought content normal          Judgment: Judgment normal

## 2022-09-26 NOTE — PROGRESS NOTES
Assessment/Plan:    Infected cyst of skin  Patient returns for routine scheduled follow-up status post incision and drainage of an infected epidermoid cyst of the posterior left shoulder  Today patient voiced no complaints referable to the wound  On physical examination the patient is well appearing  Awake alert oriented  Pleasant competent reliable as a historian  The surgical wound clean dry and intact  Fully epithelialized  Patient appears well  No additional investigations or interventions are indicated at the present time  The patient has been encouraged to follow up with the practice at any point future with questions or concerns  Subjective:      Patient ID: Ebb Goldmann is a 46 y o  male  Pt is coming in the office today for 2 wk f/u I&D 09/02/22 on cyst of the back/ wound check  Wound looks good no pain, fever/chills FINA Garcia MA      The following portions of the patient's history were reviewed and updated as appropriate: allergies, current medications, past family history, past medical history, past social history, past surgical history and problem list     Review of Systems   Constitutional: Negative for chills and fever  HENT: Negative for ear pain and sore throat  Eyes: Negative for pain and visual disturbance  Respiratory: Negative for cough and shortness of breath  Cardiovascular: Negative for chest pain and palpitations  Gastrointestinal: Negative for abdominal pain and vomiting  Genitourinary: Negative for dysuria and hematuria  Musculoskeletal: Negative for arthralgias and back pain  Skin: Negative for color change and rash  Neurological: Negative for seizures and syncope  All other systems reviewed and are negative  Objective:      Temp 97 8 °F (36 6 °C) (Temporal)          Physical Exam  Vitals and nursing note reviewed  Constitutional:       Appearance: He is well-developed  HENT:      Head: Normocephalic and atraumatic  Eyes:      Conjunctiva/sclera: Conjunctivae normal       Pupils: Pupils are equal, round, and reactive to light  Cardiovascular:      Rate and Rhythm: Normal rate and regular rhythm  Pulmonary:      Effort: Pulmonary effort is normal       Breath sounds: Normal breath sounds  Abdominal:      General: Bowel sounds are normal       Palpations: Abdomen is soft  Musculoskeletal:         General: Normal range of motion  Cervical back: Normal range of motion and neck supple  Skin:     General: Skin is warm and dry  Comments: Wound clean dry intact  No evidence of residual cyst   Neurological:      Mental Status: He is alert and oriented to person, place, and time  Psychiatric:         Behavior: Behavior normal          Thought Content:  Thought content normal          Judgment: Judgment normal

## 2022-09-26 NOTE — ASSESSMENT & PLAN NOTE
Patient returns for routine scheduled follow-up status post incision and drainage of an infected epidermoid cyst of the posterior left shoulder  Today patient voiced no complaints referable to the wound  On physical examination the patient is well appearing  Awake alert oriented  Pleasant competent reliable as a historian  The surgical wound clean dry and intact  Fully epithelialized  Patient appears well  No additional investigations or interventions are indicated at the present time  The patient has been encouraged to follow up with the practice at any point future with questions or concerns

## 2022-10-05 DIAGNOSIS — N40.1 BENIGN PROSTATIC HYPERPLASIA WITH URINARY FREQUENCY: ICD-10-CM

## 2022-10-05 DIAGNOSIS — N52.9 ERECTILE DYSFUNCTION, UNSPECIFIED ERECTILE DYSFUNCTION TYPE: ICD-10-CM

## 2022-10-05 DIAGNOSIS — R35.0 BENIGN PROSTATIC HYPERPLASIA WITH URINARY FREQUENCY: ICD-10-CM

## 2022-10-05 RX ORDER — TAMSULOSIN HYDROCHLORIDE 0.4 MG/1
0.4 CAPSULE ORAL
Qty: 30 CAPSULE | Refills: 11 | Status: SHIPPED | OUTPATIENT
Start: 2022-10-05

## 2022-10-05 RX ORDER — SILDENAFIL 100 MG/1
100 TABLET, FILM COATED ORAL AS NEEDED
Qty: 6 TABLET | Refills: 3 | Status: CANCELLED | OUTPATIENT
Start: 2022-10-05

## 2022-10-05 RX ORDER — SILDENAFIL 100 MG/1
100 TABLET, FILM COATED ORAL AS NEEDED
Qty: 10 TABLET | Refills: 12 | Status: SHIPPED | OUTPATIENT
Start: 2022-10-05

## 2023-01-23 ENCOUNTER — HOSPITAL ENCOUNTER (OUTPATIENT)
Dept: RADIOLOGY | Facility: HOSPITAL | Age: 53
Discharge: HOME | End: 2023-01-23

## 2023-01-23 DIAGNOSIS — M54.50 LOW BACK PAIN, UNSPECIFIED BACK PAIN LATERALITY, UNSPECIFIED CHRONICITY, UNSPECIFIED WHETHER SCIATICA PRESENT: ICD-10-CM

## 2023-04-20 PROBLEM — R07.9 CHEST PAIN: Status: ACTIVE | Noted: 2023-04-20

## 2023-05-03 ENCOUNTER — CONSULT (OUTPATIENT)
Dept: PULMONOLOGY | Facility: CLINIC | Age: 53
End: 2023-05-03

## 2023-05-03 VITALS
WEIGHT: 242.4 LBS | DIASTOLIC BLOOD PRESSURE: 78 MMHG | OXYGEN SATURATION: 95 % | SYSTOLIC BLOOD PRESSURE: 128 MMHG | HEART RATE: 81 BPM | BODY MASS INDEX: 36.74 KG/M2 | HEIGHT: 68 IN

## 2023-05-03 DIAGNOSIS — R06.83 SNORING: ICD-10-CM

## 2023-05-03 DIAGNOSIS — Z91.09 ENVIRONMENTAL ALLERGIES: ICD-10-CM

## 2023-05-03 DIAGNOSIS — G47.30 SLEEP-DISORDERED BREATHING: ICD-10-CM

## 2023-05-03 DIAGNOSIS — F17.210 CIGARETTE NICOTINE DEPENDENCE WITHOUT COMPLICATION: ICD-10-CM

## 2023-05-03 DIAGNOSIS — R07.9 CHEST PAIN, UNSPECIFIED TYPE: Primary | ICD-10-CM

## 2023-05-03 PROBLEM — R93.89 ABNORMAL CT OF THE CHEST: Status: ACTIVE | Noted: 2023-05-03

## 2023-05-03 RX ORDER — OLOPATADINE HYDROCHLORIDE 1 MG/ML
1 SOLUTION/ DROPS OPHTHALMIC 2 TIMES DAILY
Qty: 5 ML | Refills: 3 | Status: SHIPPED | OUTPATIENT
Start: 2023-05-03

## 2023-05-03 RX ORDER — FLUTICASONE PROPIONATE 50 MCG
1 SPRAY, SUSPENSION (ML) NASAL DAILY
Qty: 16 G | Refills: 3 | Status: SHIPPED | OUTPATIENT
Start: 2023-05-03

## 2023-05-03 NOTE — ASSESSMENT & PLAN NOTE
Patient is  a 42-year-old male, , cigarette smoker (up to 1 pack/day for the past 40 years), with a history of other occupational exposures over the past several years who was referred to the pulmonary clinic for evaluation of shortness of breath, cough, chest tightness and wheezing in the context of an abnormal CT showing some evidence of bronchiolitis  Patient was given empiric diagnosis of Welders lung disease by PCP  However the CT is not quite consistent with Welders lung disease or interstitial process  The tree-in-bud changes are very subtle  Groundglass was identified on chest x-ray but not discernibly noted on the CT of the chest   Differential diagnosis includes COPD, asthma, bronchitis, allergies  He has benefited from bronchodilator therapy  Patiently is now using a protective equipment at work  Plan  We will refer for full pulmonary function test  Continue albuterol  Given samples of Anoro Ellipta  We will treat for allergies  In the future could consider bronchoscopy if his symptoms are recurrent or unimproved to assess for infectious or inflammatory bronchiolitis or evidence of metal fragments in a BAL  Depending on pfts/symptoms could also consider high resolution CT chest  F/u in 2 months

## 2023-05-03 NOTE — ASSESSMENT & PLAN NOTE
There is a high suspicion for obstructive sleep apnea  Stop bang questionnaire indicates a high risk for moderate to severe obstructive sleep apnea  Patient is willing to undergo home sleep study      Plan  Referred for home sleep study

## 2023-05-03 NOTE — PROGRESS NOTES
Pulmonary Consultation   Anay Monroe 46 y o  male MRN: [id number]  5/3/2023      Assessment:    Abnormal CT of the chest  Patient is  a 60-year-old male, , cigarette smoker (up to 1 pack/day for the past 40 years), with a history of other occupational exposures over the past several years who was referred to the pulmonary clinic for evaluation of shortness of breath, cough, chest tightness and wheezing in the context of an abnormal CT showing some evidence of bronchiolitis  Patient was given empiric diagnosis of Welders lung disease by PCP  However the CT is not quite consistent with Welders lung disease or interstitial process  The tree-in-bud changes are very subtle  Groundglass was identified on chest x-ray but not discernibly noted on the CT of the chest   Differential diagnosis includes COPD, asthma, bronchitis, allergies  He has benefited from bronchodilator therapy  Patiently is now using a protective equipment at work  Plan  We will refer for full pulmonary function test  Continue albuterol  Given samples of Anoro Ellipta  We will treat for allergies  In the future could consider bronchoscopy if his symptoms are recurrent or unimproved to assess for infectious or inflammatory bronchiolitis or evidence of metal fragments in a BAL  Depending on pfts/symptoms could also consider high resolution CT chest  F/u in 2 months  Snoring  There is a high suspicion for obstructive sleep apnea  Stop bang questionnaire indicates a high risk for moderate to severe obstructive sleep apnea  Patient is willing to undergo home sleep study  Plan  Referred for home sleep study    Cigarette nicotine dependence without complication  Patient smokes approximately 10 to 13 cigarettes/day  He did successfully quit between the years of 2013- 2018  He is currently not interested in quitting due to several other life stressors    We will continue to address during subsequent visits  Environmental allergies  Environmental allergies  He thinks he is allergic to pollen but has not been tested for other substances  Endorses rhinitis, postnasal drip, itchy red eyes  Plan  Check [facility] allergy panel  Start Flonase, Pataday eyedrops  Recommended oral antihistamine as well  Plan:    Diagnoses and all orders for this visit:    Chest pain, unspecified type  -     Complete PFT with post bronchodilator; Future    Sleep-disordered breathing  -     Home Study; Future    Environmental allergies  -     fluticasone (FLONASE) 50 mcg/act nasal spray; 1 spray into each nostril daily  -     olopatadine (PATANOL) 0 1 % ophthalmic solution; Administer 1 drop to both eyes 2 (two) times a day  -     [facility] Allergy Panel, Adult; Future    Snoring    Cigarette nicotine dependence without complication        Return in about 2 months (around 7/3/2023)  History of Present Illness   HPI:  Nusrat Garcia is a 46 y o  male who is referred for evaluation of an abnormal CT of the chest and chest pain  [facility] [address] [facility] to throbbing chest pain  He is not experiencing any systemic symptoms at that time  Per  review of note vital signs and examination were unremarkable in the emergency room  COVID/influenza/RSV PCR were negative  Troponins were unremarkable  Underwent CT angiogram that showed no evidence of pulmonary embolism but mild diffuse tree-in-bud compatible with bronchiolitis  States that he was diagnosed with 's lung disease [facility]  He is now using a vented yary (previously no protection)  Since using the vented yary he feels better  He was also prescribed albuterol that he used every 4 hours for two weeks and is now using it prn  He has a cough productive of rust colored phlegm  He previously was experiecing wheezing, sob, throbbing chest pain, and a cough  He did not have fevers  "    Reports environmental allergies with chronic sinus pressure and postnasal drip  Reports episodes of \"bronchitis\" every year for the past 3 years  He has not required hospitalization  He has not required   any recent antibiotics or oral steroids  Per his wife he snores and has apnea periods  His brother has sleep apnea  STOP-BANG questionnaire was 7 points indicating high risk for moderate to severe obstructive sleep apnea  He is here today with his wife  He does not have any acute complaints  Overall symptoms have improved  Review of Systems   Constitutional: Negative for chills, fatigue and fever  HENT: Positive for congestion, postnasal drip and sinus pressure  Negative for nosebleeds, rhinorrhea and sore throat  Eyes: Negative for discharge, redness and itching  Respiratory: Positive for chest tightness, shortness of breath and wheezing  Negative for cough, choking and stridor  Cardiovascular: Positive for chest pain  Negative for palpitations and leg swelling  Gastrointestinal: Negative for blood in stool  Genitourinary: Negative for difficulty urinating and dysuria  Musculoskeletal: Negative for arthralgias, joint swelling and myalgias  Skin: Negative for color change and rash  Neurological: Negative for light-headedness and headaches  Hematological: Negative for adenopathy  Historical Information   Past Medical History:   Diagnosis Date    Chest pain      History reviewed  No pertinent surgical history  Family History   Problem Relation Age of Onset   [facility] Cancer Mother     Cancer Father     Heart disease Maternal Grandfather     Heart disease Paternal Grandfather     No Known Problems Daughter        Social History   Places lived: [place]  Occupation: - for 27 years -[employer] core, 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, worked on framing crews building houses, 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   Tobacco: 1 pack/day    He was able to quit between 2013 " "4716  Alcohol:  Illicits:  None   Hobbies:   Pets: 2 dogs   Travel: none       Meds/Allergies     Current Outpatient Medications:     albuterol (ProAir HFA) 90 mcg/act inhaler, Inhale 2 puffs every 6 (six) hours as needed for wheezing or shortness of breath, Disp: 8 5 g, Rfl: 0    fluticasone (FLONASE) 50 mcg/act nasal spray, 1 spray into each nostril daily, Disp: 16 g, Rfl: 3    olopatadine (PATANOL) 0 1 % ophthalmic solution, Administer 1 drop to both eyes 2 (two) times a day, Disp: 5 mL, Rfl: 3    tamsulosin (FLOMAX) 0 4 mg, Take 1 capsule (0 4 mg total) by mouth daily with dinner, Disp: 30 capsule, Rfl: 11    methylPREDNISolone 4 MG tablet therapy pack, Use as directed on package (Patient not taking: Reported on 4/20/2023), Disp: 21 tablet, Rfl: 0    sildenafil (VIAGRA) 100 mg tablet, Take 1 tablet (100 mg total) by mouth as needed for erectile dysfunction Take 1 tablet by mouth 1 hour prior to sex on empty stomach (2 hours after meal)  Do not take within 4 hours of tamsulosin! (Patient not taking: Reported on 5/3/2023), Disp: 10 tablet, Rfl: 12  Allergies   Allergen Reactions    Pollen Extract Itching       Vitals: Blood pressure 128/78, pulse 81, height 5' 8\" (1 727 m), weight 110 kg (242 lb 6 4 oz), SpO2 95 %  Body mass index is 36 86 kg/m²  Oxygen Therapy  SpO2: 95 %  Oxygen Therapy: None (Room air)      Physical Exam  Physical Exam  Vitals reviewed  Constitutional:       General: He is not in acute distress  Appearance: He is well-developed  He is not ill-appearing, toxic-appearing or diaphoretic  HENT:      Head: Normocephalic and atraumatic  Right Ear: External ear normal       Left Ear: External ear normal       Nose: Nose normal  No congestion or rhinorrhea  Mouth/Throat:      Pharynx: No oropharyngeal exudate or posterior oropharyngeal erythema  Eyes:      General: No scleral icterus  Right eye: No discharge  Left eye: No discharge        Conjunctiva/sclera: " Conjunctivae normal       Pupils: Pupils are equal, round, and reactive to light  Neck:      Trachea: No tracheal deviation  Cardiovascular:      Rate and Rhythm: Normal rate and regular rhythm  Heart sounds: Normal heart sounds  No murmur heard  No friction rub  No gallop  Pulmonary:      Effort: Pulmonary effort is normal       Breath sounds: Normal breath sounds  No stridor  No wheezing or rales  Musculoskeletal:      Cervical back: Normal range of motion  Right lower leg: No edema  Left lower leg: No edema  Lymphadenopathy:      Head:      Right side of head: No submental, submandibular, preauricular or posterior auricular adenopathy  Left side of head: No submental, submandibular, preauricular or posterior auricular adenopathy  Cervical: No cervical adenopathy  Skin:     General: Skin is warm and dry  Findings: No lesion or rash  Neurological:      Mental Status: He is alert and oriented to person, place, and time  Labs: I have personally reviewed pertinent lab results  Lab Results   Component Value Date    WBC 7 24 04/10/2023    HGB 17 1 (H) 04/10/2023    HCT 50 5 (H) 04/10/2023    MCV 94 04/10/2023     04/10/2023     Lab Results   Component Value Date    CALCIUM 8 6 04/10/2023    K 3 9 04/10/2023    CO2 23 04/10/2023     04/10/2023    BUN 14 04/10/2023    CREATININE 0 92 04/10/2023     No results found for: IGE  No results found for: ALT, AST, GGT, ALKPHOS, BILITOT    Imaging and other studies: I have personally reviewed pertinent reports  and I have personally reviewed pertinent films in PACS    CTA PE study 4/10/2023  IMPRESSION:     With mild compromise by respiratory motion, no acute pulmonary embolus      Mild diffuse tree-in-bud nodularity compatible with bronchiolitis      Mild dependent atelectasis in the lower lobes      Small hiatal hernia        Pulmonary function testing:   Ordered today       KATHE Griffith    [facility]

## 2023-05-03 NOTE — ASSESSMENT & PLAN NOTE
Environmental allergies  He thinks he is allergic to pollen but has not been tested for other substances  Endorses rhinitis, postnasal drip, itchy red eyes  Plan  Check [place] allergy panel  Start Flonase, Pataday eyedrops  Recommended oral antihistamine as well

## 2023-05-03 NOTE — ASSESSMENT & PLAN NOTE
Patient smokes approximately 10 to 13 cigarettes/day  He did successfully quit between the years of 2013- 2018  He is currently not interested in quitting due to several other life stressors  We will continue to address during subsequent visits

## 2023-05-05 ENCOUNTER — APPOINTMENT (OUTPATIENT)
Dept: LAB | Facility: MEDICAL CENTER | Age: 53
End: 2023-05-05

## 2023-05-05 ENCOUNTER — OFFICE VISIT (OUTPATIENT)
Dept: UROLOGY | Facility: CLINIC | Age: 53
End: 2023-05-05

## 2023-05-05 ENCOUNTER — TELEPHONE (OUTPATIENT)
Dept: SLEEP CENTER | Facility: CLINIC | Age: 53
End: 2023-05-05

## 2023-05-05 VITALS
BODY MASS INDEX: 36.83 KG/M2 | HEIGHT: 68 IN | DIASTOLIC BLOOD PRESSURE: 80 MMHG | WEIGHT: 243 LBS | SYSTOLIC BLOOD PRESSURE: 112 MMHG

## 2023-05-05 DIAGNOSIS — N40.1 BENIGN PROSTATIC HYPERPLASIA WITH URINARY FREQUENCY: ICD-10-CM

## 2023-05-05 DIAGNOSIS — Z12.5 SCREENING FOR MALIGNANT NEOPLASM OF PROSTATE: Primary | ICD-10-CM

## 2023-05-05 DIAGNOSIS — Z91.09 ENVIRONMENTAL ALLERGIES: ICD-10-CM

## 2023-05-05 DIAGNOSIS — R35.0 BENIGN PROSTATIC HYPERPLASIA WITH URINARY FREQUENCY: ICD-10-CM

## 2023-05-05 DIAGNOSIS — E78.5 HYPERLIPIDEMIA, UNSPECIFIED HYPERLIPIDEMIA TYPE: ICD-10-CM

## 2023-05-05 DIAGNOSIS — R31.29 MICROHEMATURIA: ICD-10-CM

## 2023-05-05 DIAGNOSIS — N52.9 ERECTILE DYSFUNCTION, UNSPECIFIED ERECTILE DYSFUNCTION TYPE: ICD-10-CM

## 2023-05-05 DIAGNOSIS — R73.01 IMPAIRED FASTING GLUCOSE: ICD-10-CM

## 2023-05-05 DIAGNOSIS — R53.83 FATIGUE, UNSPECIFIED TYPE: ICD-10-CM

## 2023-05-05 LAB
ALBUMIN SERPL BCP-MCNC: 3.9 G/DL (ref 3.5–5)
ALP SERPL-CCNC: 67 U/L (ref 46–116)
ALT SERPL W P-5'-P-CCNC: 66 U/L (ref 12–78)
ANION GAP SERPL CALCULATED.3IONS-SCNC: 3 MMOL/L (ref 4–13)
AST SERPL W P-5'-P-CCNC: 28 U/L (ref 5–45)
BACTERIA UR QL AUTO: NORMAL /HPF
BILIRUB SERPL-MCNC: 0.43 MG/DL (ref 0.2–1)
BILIRUB UR QL STRIP: NEGATIVE
BUN SERPL-MCNC: 14 MG/DL (ref 5–25)
CALCIUM SERPL-MCNC: 8.8 MG/DL (ref 8.3–10.1)
CHLORIDE SERPL-SCNC: 107 MMOL/L (ref 96–108)
CHOLEST SERPL-MCNC: 154 MG/DL
CLARITY UR: CLEAR
CO2 SERPL-SCNC: 23 MMOL/L (ref 21–32)
COLOR UR: COLORLESS
CREAT SERPL-MCNC: 0.8 MG/DL (ref 0.6–1.3)
GFR SERPL CREATININE-BSD FRML MDRD: 102 ML/MIN/1.73SQ M
GLUCOSE P FAST SERPL-MCNC: 90 MG/DL (ref 65–99)
GLUCOSE UR STRIP-MCNC: NEGATIVE MG/DL
HDLC SERPL-MCNC: 48 MG/DL
HGB UR QL STRIP.AUTO: NEGATIVE
KETONES UR STRIP-MCNC: NEGATIVE MG/DL
LDLC SERPL CALC-MCNC: 86 MG/DL (ref 0–100)
LEUKOCYTE ESTERASE UR QL STRIP: NEGATIVE
NITRITE UR QL STRIP: NEGATIVE
NON-SQ EPI CELLS URNS QL MICRO: NORMAL /HPF
NONHDLC SERPL-MCNC: 106 MG/DL
PH UR STRIP.AUTO: 6.5 [PH]
POTASSIUM SERPL-SCNC: 4.1 MMOL/L (ref 3.5–5.3)
PROT SERPL-MCNC: 7.4 G/DL (ref 6.4–8.4)
PROT UR STRIP-MCNC: NEGATIVE MG/DL
RBC #/AREA URNS AUTO: NORMAL /HPF
SL AMB  POCT GLUCOSE, UA: NORMAL
SL AMB LEUKOCYTE ESTERASE,UA: NORMAL
SL AMB POCT BILIRUBIN,UA: NORMAL
SL AMB POCT BLOOD,UA: NORMAL
SL AMB POCT CLARITY,UA: CLEAR
SL AMB POCT COLOR,UA: YELLOW
SL AMB POCT KETONES,UA: NORMAL
SL AMB POCT NITRITE,UA: NORMAL
SL AMB POCT PH,UA: 6.5
SL AMB POCT SPECIFIC GRAVITY,UA: 1
SL AMB POCT URINE PROTEIN: NORMAL
SL AMB POCT UROBILINOGEN: 0.2
SODIUM SERPL-SCNC: 133 MMOL/L (ref 135–147)
SP GR UR STRIP.AUTO: 1.01 (ref 1–1.03)
T4 FREE SERPL-MCNC: 0.89 NG/DL (ref 0.76–1.46)
TRIGL SERPL-MCNC: 98 MG/DL
TSH SERPL DL<=0.05 MIU/L-ACNC: 1.28 UIU/ML (ref 0.45–4.5)
UROBILINOGEN UR STRIP-ACNC: <2 MG/DL
WBC #/AREA URNS AUTO: NORMAL /HPF

## 2023-05-05 RX ORDER — LISINOPRIL 5 MG/1
TABLET ORAL
COMMUNITY
Start: 2023-05-04

## 2023-05-05 NOTE — PROGRESS NOTES
2023    No chief complaint on file  Assessment and Plan    46 y o  male manage by Dr Srini Sutherland    1  BPH  · He continues to do well with Flomax 0 4 mg HS  · Follow-up 1 year    2  ED  · Continues to do well with sildenafil 100 mg PRN  · Follow-up 1 year    3  Prostate cancer screening  · PSA normal at 0 7   · [facility]CK[facility] deferred this office  · Follow-up 1 year with PSA    4  Microscopic hematuria   Urine dip in office revealed trace blood, negative leukocytes or nitrates  ? Denies gross hematuria  ? Send for micro, if positive will update upper tract imaging   Patient is a smoker for 30 years smoking approximately 1 pack per day   Renal ultrasound from 2021 revealed unremarkable kidneys  Subjective:    He presents today reporting doing very well since the last time I saw him  He continues to do well with Flomax for his BPH and sildenafil for his erectile dysfunction  He denies any gross hematuria or any additional lower urinary tract symptoms  History of Present Illness  Wendy Mejía is a 46 y o  male here for annual follow-up evaluation of prostate cancer screening, BPH, and erectile dysfunction  He was last seen by me on 2022 for annual follow-up  Patient history of BPH and was started on Flomax 0 4 mg at bedtime and 2021 for reports of nocturia 3-4 times per night  Since starting Flomax patient reported significant proved in his urinary symptoms  During his last office visit with me he reported intermittent nocturia about 1 occurrence per week compared to prior reports of 3-4 episodes per night  In regards to his erectile dysfunction he had seen satisfactory improvement with the use of Viagra 100 mg as needed  Prostate cancer screening: Patient has a positive family history of prostate cancer in his father who was diagnosed at age 61,  Intra-Op RALP  Current PSA 0 7 as of 2023 previously 0 6 on 3/25/2022      Also noted during his last office visit with me his urine dip had trace blood  He denied any episodes of gross hematuria  He is a smoker for 30 years  Urine microscopic testing however would be negative for true microscopic hematuria  Review of Systems   Constitutional: Negative for chills and fever  HENT: Negative for congestion and sore throat  Respiratory: Negative for cough and shortness of breath  Cardiovascular: Negative for chest pain and leg swelling  Gastrointestinal: Negative for abdominal pain, constipation, diarrhea, nausea and vomiting  Genitourinary: Negative for difficulty urinating, dysuria, flank pain, frequency, hematuria and urgency  Musculoskeletal: Negative for back pain and gait problem  Skin: Negative for wound  Allergic/Immunologic: Negative for immunocompromised state  Neurological: Negative for dizziness, weakness and numbness  Hematological: Does not bruise/bleed easily             AUA SYMPTOM SCORE    Flowsheet Row Most Recent Value   AUA SYMPTOM SCORE    How often have you had a sensation of not emptying your bladder completely after you finished urinating? 0 (P)     How often have you had to urinate again less than two hours after you finished urinating? 1 (P)     How often have you found you stopped and started again several times when you urinate? 0 (P)     How often have you found it difficult to postpone urination? 1 (P)     How often have you had a weak urinary stream? 0 (P)     How often have you had to push or strain to begin urination? 0 (P)     How many times did you most typically get up to urinate from the time you went to bed at night until the time you got up in the morning? 1 (P)     Quality of Life: If you were to spend the rest of your life with your urinary condition just the way it is now, how would you feel about that? 3 (P)     AUA SYMPTOM SCORE 3 (P)           Vitals  Vitals:    05/05/23 0913   BP: 112/80   BP Location: Right arm   Patient Position: "Sitting   Cuff Size: Adult   Weight: 110 kg (243 lb)   Height: 5' 8\" (1 727 m)       Physical Exam  Vitals reviewed  Constitutional:       General: He is not in acute distress  Appearance: Normal appearance  He is not ill-appearing or toxic-appearing  HENT:      Head: Normocephalic and atraumatic  Eyes:      General: No scleral icterus  Conjunctiva/sclera: Conjunctivae normal    Cardiovascular:      Rate and Rhythm: Normal rate  Pulses: Normal pulses  Heart sounds: Normal heart sounds  Pulmonary:      Effort: Pulmonary effort is normal  No respiratory distress  Abdominal:      General: Abdomen is flat  Palpations: Abdomen is soft  Tenderness: There is no abdominal tenderness  There is no right CVA tenderness or left CVA tenderness  Hernia: No hernia is present  Musculoskeletal:      Cervical back: Normal range of motion  Right lower leg: No edema  Left lower leg: No edema  Skin:     General: Skin is warm and dry  Coloration: Skin is not jaundiced or pale  Neurological:      General: No focal deficit present  Mental Status: He is alert and oriented to person, place, and time  Mental status is at baseline  Gait: Gait normal    Psychiatric:         Mood and Affect: Mood normal          Behavior: Behavior normal          Thought Content:  Thought content normal          Judgment: Judgment normal          Past History  Past Medical History:   Diagnosis Date    Chest pain      Social History     Socioeconomic History    Marital status: /Civil Union     Spouse name: None    Number of children: None    Years of education: None    Highest education level: None   Occupational History    None   Tobacco Use    Smoking status: Every Day     Packs/day: 1 00     Types: Cigarettes     Start date: 1985    Smokeless tobacco: Never    Tobacco comments:     10 to 12 cigarettes a day he did  quit from [id number]   Vaping Use    Vaping Use: Never " used   Substance and Sexual Activity    Alcohol use: Yes     Comment: occasionally    Drug use: No    Sexual activity: Yes   Other Topics Concern    None   Social History Narrative    None     Social Determinants of Health     Financial Resource Strain: Not on file   Food Insecurity: Not on file   Transportation Needs: Not on file   Physical Activity: Not on file   Stress: Not on file   Social Connections: Not on file   Intimate Partner Violence: Not on file   Housing Stability: Not on file     Social History     Tobacco Use   Smoking Status Every Day    Packs/day: 1 00    Types: Cigarettes    Start date: 1985   Smokeless Tobacco Never   Tobacco Comments    10 to 12 cigarettes a day he did  quit from [id number]     Family History   Problem Relation Age of Onset    Cancer Mother     Cancer Father     Heart disease Maternal Grandfather     Heart disease Paternal Grandfather     No Known Problems Daughter        The following portions of the patient's history were reviewed and updated as appropriate allergies, current medications, past medical history, past social history, past surgical history and problem list    Imaging:    Results  No results found for this or any previous visit (from the past 1 hour(s)) ]  Lab Results   Component Value Date    PSA 0 7 04/14/2023    PSA 0 6 03/25/2022     Lab Results   Component Value Date    CALCIUM 8 6 04/10/2023    K 3 9 04/10/2023    CO2 23 04/10/2023     04/10/2023    BUN 14 04/10/2023    CREATININE 0 92 04/10/2023     Lab Results   Component Value Date    WBC 7 24 04/10/2023    HGB 17 1 (H) 04/10/2023    HCT 50 5 (H) 04/10/2023    MCV 94 04/10/2023     04/10/2023       Please Note:  Voice dictation software has been used to create this document  There may be inadvertent transcriptions errors       JOLYNN Crystal 05/05/23

## 2023-05-05 NOTE — TELEPHONE ENCOUNTER
----- Message from Caleb Arana MD sent at 5/5/2023 12:50 PM EDT -----  Approved    ----- Message -----  From: Tommy Bravo  Sent: 2/4/5900   8:49 AM EDT  To: Sleep Medicine [place] Provider    This home sleep study needs approval      If approved please sign and return to clerical pool  If denied please include reasons why  Also provide alternative testing if warranted  Please sign and return to clerical pool  Vaccine Information Sheet, \"Influenza - Inactivated\"  given to Dat Diaz, or parent/legal guardian of  Dat Diaz and verbalized understanding. Patient responses:    Have you ever had a reaction to a flu vaccine? No  Do you have any current illness? No  Have you ever had [place] Syndrome? No  Do you have a serious allergy to any of the follow: Neomycin, Polymyxin, Thimerosal, eggs or egg products? No    Flu vaccine given per order. Please see immunization tab. Risks and benefits explained. Current VIS given.

## 2023-05-07 LAB
EST. AVERAGE GLUCOSE BLD GHB EST-MCNC: 117 MG/DL
HBA1C MFR BLD: 5.7 %

## 2023-05-08 ENCOUNTER — TELEPHONE (OUTPATIENT)
Dept: UROLOGY | Facility: CLINIC | Age: 53
End: 2023-05-08

## 2023-05-08 NOTE — RESULT ENCOUNTER NOTE
Please let patient know that his urine testing was negative for microscopic hematuria  I do not recommend any imaging or cystoscopy at this time   He can follow-up in 1 year

## 2023-05-08 NOTE — TELEPHONE ENCOUNTER
----- Message from Antonina U  79  sent at 5/8/2023 10:53 AM EDT -----  Please let patient know that his urine testing was negative for microscopic hematuria  I do not recommend any imaging or cystoscopy at this time   He can follow-up in 1 year

## 2023-05-09 LAB

## 2023-05-10 ENCOUNTER — HOSPITAL ENCOUNTER (OUTPATIENT)
Dept: PULMONOLOGY | Facility: HOSPITAL | Age: 53
Discharge: HOME | End: 2023-05-10
Attending: INTERNAL MEDICINE

## 2023-05-10 DIAGNOSIS — R07.9 CHEST PAIN, UNSPECIFIED TYPE: ICD-10-CM

## 2023-05-10 RX ORDER — ALBUTEROL SULFATE 2.5 MG/3ML
2.5 SOLUTION RESPIRATORY (INHALATION) ONCE AS NEEDED
Status: COMPLETED | OUTPATIENT
Start: 2023-05-10 | End: 2023-05-10

## 2023-05-10 RX ADMIN — ALBUTEROL SULFATE 2.5 MG: 2.5 SOLUTION RESPIRATORY (INHALATION) at 09:37

## 2023-06-07 ENCOUNTER — HOSPITAL ENCOUNTER (OUTPATIENT)
Dept: SLEEP CENTER | Facility: HOSPITAL | Age: 53
Discharge: HOME | End: 2023-06-07
Attending: INTERNAL MEDICINE
Payer: COMMERCIAL

## 2023-06-07 DIAGNOSIS — G47.30 SLEEP-DISORDERED BREATHING: ICD-10-CM

## 2023-06-07 PROCEDURE — G0399 HOME SLEEP TEST/TYPE 3 PORTA: HCPCS

## 2023-06-09 DIAGNOSIS — G47.33 OSA (OBSTRUCTIVE SLEEP APNEA): ICD-10-CM

## 2023-06-09 DIAGNOSIS — G47.30 SLEEP-DISORDERED BREATHING: Primary | ICD-10-CM

## 2023-06-09 PROCEDURE — 95806 SLEEP STUDY UNATT&RESP EFFT: CPT | Performed by: INTERNAL MEDICINE

## 2023-06-09 NOTE — PROGRESS NOTES
Home Sleep Study Documentation    HOME STUDY DEVICE: Noxturnal no                                           Rocio G3 yes      Pre-Sleep Home Study:    Set-up and instructions performed by: home study tech    Technician performed demonstration for Patient: yes    Return demonstration performed by Patient: yes    Written instructions provided to Patient: yes    Patient signed consent form: yes        Post-Sleep Home Study:    Additional comments by Patient: none    Home Sleep Study Failed:no:    Failure reason: N/A    Reported or Detected: N/A    Scored by: NIDA Knight

## 2023-06-18 NOTE — RESULT ENCOUNTER NOTE
Please call Lawrence Horne and let him know the results of his sleep study and that auto CPAP was ordered  Please process the order and have a compliance appointment scheduled  Thank you

## 2023-06-21 LAB

## 2023-06-22 ENCOUNTER — TELEPHONE (OUTPATIENT)
Dept: SLEEP CENTER | Facility: CLINIC | Age: 53
End: 2023-06-22

## 2023-07-01 LAB

## 2023-09-06 ENCOUNTER — OFFICE VISIT (OUTPATIENT)
Dept: PULMONOLOGY | Facility: CLINIC | Age: 53
End: 2023-09-06
Payer: COMMERCIAL

## 2023-09-06 VITALS
OXYGEN SATURATION: 96 % | SYSTOLIC BLOOD PRESSURE: 128 MMHG | BODY MASS INDEX: 37.25 KG/M2 | RESPIRATION RATE: 18 BRPM | DIASTOLIC BLOOD PRESSURE: 78 MMHG | HEART RATE: 81 BPM | WEIGHT: 245.8 LBS | HEIGHT: 68 IN

## 2023-09-06 DIAGNOSIS — J20.8 ACUTE BRONCHITIS DUE TO OTHER SPECIFIED ORGANISMS: ICD-10-CM

## 2023-09-06 DIAGNOSIS — Z91.09 ENVIRONMENTAL ALLERGIES: ICD-10-CM

## 2023-09-06 DIAGNOSIS — R05.1 ACUTE COUGH: ICD-10-CM

## 2023-09-06 DIAGNOSIS — F17.210 CIGARETTE NICOTINE DEPENDENCE WITHOUT COMPLICATION: ICD-10-CM

## 2023-09-06 DIAGNOSIS — G47.33 OSA (OBSTRUCTIVE SLEEP APNEA): ICD-10-CM

## 2023-09-06 DIAGNOSIS — R93.89 ABNORMAL CT OF THE CHEST: Primary | ICD-10-CM

## 2023-09-06 PROCEDURE — 99214 OFFICE O/P EST MOD 30 MIN: CPT | Performed by: INTERNAL MEDICINE

## 2023-09-06 RX ORDER — UMECLIDINIUM BROMIDE AND VILANTEROL TRIFENATATE 62.5; 25 UG/1; UG/1
1 POWDER RESPIRATORY (INHALATION) DAILY
Qty: 60 BLISTER | Refills: 3 | Status: SHIPPED | OUTPATIENT
Start: 2023-09-06 | End: 2023-10-06

## 2023-09-06 RX ORDER — FLUTICASONE PROPIONATE 50 MCG
2 SPRAY, SUSPENSION (ML) NASAL DAILY
Qty: 16 G | Refills: 3 | Status: SHIPPED | OUTPATIENT
Start: 2023-09-06

## 2023-09-06 RX ORDER — ALBUTEROL SULFATE 90 UG/1
2 AEROSOL, METERED RESPIRATORY (INHALATION) EVERY 6 HOURS PRN
Qty: 8.5 G | Refills: 3 | Status: SHIPPED | OUTPATIENT
Start: 2023-09-06

## 2023-09-06 NOTE — PROGRESS NOTES
Pulmonary Consultation   James Monroe 46 y.o. male MRN: [id number]  9/6/2023      Assessment:    PAT (obstructive sleep apnea)  Patient was recently diagnosed with obstructive sleep apnea. He reports adherence to PAP therapy. Compliance report reviewed from August 7 through September 5. Patient using device 70% of the time. Using it for greater than 4 hours 33% of the time. Average use during days used 4 hours and 4 minutes. Minimum pressure 4 cmH2O Max pressure 20 cmH2O, AHI 2.7    Cigarette nicotine dependence without complication  Patient is actively trying to quit smoking. He is reduced his cigarettes down to 10/day. I did offer him medication assistance but he declines at this time. We will continue to readdress in the future. Abnormal CT of the chest  Patient is  a 70-year-old male, , cigarette smoker (up to 1 pack/day for the past 40 years), with a history of other occupational exposures over the past several years who was referred to the pulmonary clinic for evaluation of shortness of breath, cough, chest tightness and wheezing in the context of an abnormal CT showing some evidence of bronchiolitis. Patient was given empiric diagnosis of Welders lung disease by PCP. However the CT is not quite consistent with Welders lung disease or interstitial process. The tree-in-bud changes are very subtle. Groundglass was identified on chest x-ray but not discernibly noted on the CT of the chest.  Differential diagnosis includes COPD, asthma, bronchitis, allergies. Patient benefited from an [address] Have prescribed it. We did discuss potential bronchoscopy but at this time he is not demonstrating any significant infectious symptoms and he benefited from bronchodilator therapy. He will be due for lung cancer screening CT in April 2024.       Overall constellation of symptoms suggestive of early COPD (PFTs did not show significant obstruction) versus asthma versus environmental allergies. CT without evidence of interstitial lung disease    Plan  Continue Anoro Ellipta  Continue albuterol as needed  Continue efforts at smoking cessation  Self-paced exercise and weight loss is encouraged  Follow-up in 4 months              Plan:    Diagnoses and all orders for this visit:    Abnormal CT of the chest  -     umeclidinium-vilanterol (Anoro Ellipta) 62.5-25 mcg/actuation inhaler; Inhale 1 puff daily    Acute cough  -     albuterol (ProAir HFA) 90 mcg/act inhaler; Inhale 2 puffs every 6 (six) hours as needed for wheezing or shortness of breath    Acute bronchitis due to other specified organisms  -     albuterol (ProAir HFA) 90 mcg/act inhaler; Inhale 2 puffs every 6 (six) hours as needed for wheezing or shortness of breath    Environmental allergies  -     fluticasone (FLONASE) 50 mcg/act nasal spray; 2 sprays into each nostril daily    PAT (obstructive sleep apnea)    Cigarette nicotine dependence without complication        No follow-ups on file. History of Present Illness   HPI:  Jossy Donovan is a 46 y.o. male who is referred for evaluation of an abnormal CT of the chest and chest pain. Patient presented to [address] [place] [address] ER on April 10 due to throbbing chest pain. He is not experiencing any systemic symptoms at that time. Per  review of note vital signs and examination were unremarkable in the emergency room. COVID/influenza/RSV PCR were negative. Troponins were unremarkable. Underwent CT angiogram that showed no evidence of pulmonary embolism but mild diffuse tree-in-bud compatible with bronchiolitis. States that he was diagnosed with 's lung disease by his primary care physician (in [facility]). He is now using a vented yary (previously no protection). Since using the vented yary he feels better. He was also prescribed albuterol that he used every 4 hours for two weeks and is now using it prn.  He has a cough productive of rust colored phlegm. He previously was experiecing wheezing, sob, throbbing chest pain, and a cough. He did not have fevers. Reports environmental allergies with chronic sinus pressure and postnasal drip. Reports episodes of "bronchitis" every year for the past 3 years. He has not required hospitalization. He has not required   any recent antibiotics or oral steroids. Per his wife he snores and has apnea periods. His brother has sleep apnea. STOP-BANG questionnaire was 7 points indicating high risk for moderate to severe obstructive sleep apnea. He is here today with his wife. He does not have any acute complaints. Overall symptoms have improved. Patient presents today for follow-up  He was referred for sleep study showed obstructive sleep apnea. He was prescribed an auto CPAP. He did benefit from nor Ellipta. However he is out of the medication and is now experiencing more shortness of breath, chest tightness and wheezing. He is trying to quit smoking. He has reduced his cigarette use to attend per day. He is not interested in trying medications at this time. He continues to work as a  but is using a vented pang. States that he is using CPAP. At times he will take the mask off in the middle of the night but for the most part he tries to use it daily. He is not interested in influenza/COVID/pneumonia vaccinations. Review of Systems   Constitutional: Negative for chills, fatigue and fever. HENT: Negative for congestion, nosebleeds, postnasal drip, rhinorrhea, sinus pressure and sore throat. Eyes: Negative for discharge, redness and itching. Respiratory: Positive for cough, chest tightness, shortness of breath and wheezing. Negative for choking and stridor. Cardiovascular: Negative for chest pain, palpitations and leg swelling. Gastrointestinal: Negative for blood in stool. Genitourinary: Negative for difficulty urinating and dysuria.    Musculoskeletal: Negative for arthralgias, joint swelling and myalgias. Skin: Negative for color change and rash. Neurological: Negative for light-headedness and headaches. Hematological: Negative for adenopathy. Answers for HPI/ROS submitted by the patient on 9/2/2023  Do you experience frequent throat clearing?: Yes  Do you have a wet cough?: Yes  Chronicity: recurrent  When did you first notice your symptoms?: more than 1 year ago  How often do your symptoms occur?: daily  Do you have shortness of breath that occurs with effort or exertion?: No  Do you have ear congestion?: No  Do you have heartburn?: Yes  Do you have fatigue?: Yes  Do you have nasal congestion?: Yes  Do you have shortness of breath when lying flat?: Yes  Do you have shortness of breath when you wake up?: No  Do you have sweats?: No  Have you experienced weight loss?: No  Which of the following makes your symptoms worse?: change in weather, emotional stress, exposure to fumes, lying down, occupational exposure, pollen, URI  Which of the following makes your symptoms better?: change in position, oral steroids, steroid inhaler  Risk factors for lung disease: occupational exposure, smoking/tobacco exposure        Historical Information   Past Medical History:   Diagnosis Date   • Chest pain      History reviewed. No pertinent surgical history. Family History   Problem Relation Age of Onset   • Cancer Mother    • Cancer Father    • Heart disease Maternal Grandfather    • Heart disease Paternal Grandfather    • No Known Problems Daughter        Social History   Places lived: [place]  Occupation: - for 27 years -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   He was able to quit between 2013 6737  Alcohol:  Illicits:  None   Hobbies:   Pets: 2 dogs   Travel: none       Meds/Allergies     Current Outpatient Medications:   •  albuterol (ProAir HFA) 90 mcg/act inhaler, Inhale 2 puffs every 6 (six) hours as needed for wheezing or shortness of breath, Disp: 8.5 g, Rfl: 3  •  fluticasone (FLONASE) 50 mcg/act nasal spray, 2 sprays into each nostril daily, Disp: 16 g, Rfl: 3  •  lisinopril (ZESTRIL) 5 mg tablet, , Disp: , Rfl:   •  olopatadine (PATANOL) 0.1 % ophthalmic solution, Administer 1 drop to both eyes 2 (two) times a day, Disp: 5 mL, Rfl: 3  •  sildenafil (VIAGRA) 100 mg tablet, Take 1 tablet (100 mg total) by mouth as needed for erectile dysfunction Take 1 tablet by mouth 1 hour prior to sex on empty stomach (2 hours after meal). Do not take within 4 hours of tamsulosin!, Disp: 10 tablet, Rfl: 12  •  tamsulosin (FLOMAX) 0.4 mg, Take 1 capsule (0.4 mg total) by mouth daily with dinner, Disp: 30 capsule, Rfl: 11  •  umeclidinium-vilanterol (Anoro Ellipta) 62.5-25 mcg/actuation inhaler, Inhale 1 puff daily, Disp: 60 blister, Rfl: 3  •  methylPREDNISolone 4 MG tablet therapy pack, Use as directed on package (Patient not taking: Reported on 9/6/2023), Disp: 21 tablet, Rfl: 0  Allergies   Allergen Reactions   • Pollen Extract Itching       Vitals: Blood pressure 128/78, pulse 81, resp. rate 18, height 5' 8" (1.727 m), weight 111 kg (245 lb 12.8 oz), SpO2 96 %. Body mass index is 37.37 kg/m². Oxygen Therapy  SpO2: 96 %      Physical Exam  Physical Exam  Vitals reviewed. Constitutional:       General: He is not in acute distress. Appearance: He is well-developed. He is not ill-appearing, toxic-appearing or diaphoretic. HENT:      Head: Normocephalic and atraumatic. Right Ear: External ear normal.      Left Ear: External ear normal.      Nose: Nose normal. No congestion or rhinorrhea. Mouth/Throat:      Pharynx: No oropharyngeal exudate or posterior oropharyngeal erythema. Eyes:      General: No scleral icterus. Right eye: No discharge. Left eye: No discharge.       Conjunctiva/sclera: Conjunctivae normal.      Pupils: Pupils are equal, round, and reactive to light. Neck:      Trachea: No tracheal deviation. Cardiovascular:      Rate and Rhythm: Normal rate and regular rhythm. Heart sounds: Normal heart sounds. No murmur heard. No friction rub. No gallop. Pulmonary:      Effort: Pulmonary effort is normal.      Breath sounds: Normal breath sounds. No stridor. No wheezing or rales. Musculoskeletal:      Cervical back: Normal range of motion. Right lower leg: No edema. Left lower leg: No edema. Lymphadenopathy:      Head:      Right side of head: No submental, submandibular, preauricular or posterior auricular adenopathy. Left side of head: No submental, submandibular, preauricular or posterior auricular adenopathy. Cervical: No cervical adenopathy. Skin:     General: Skin is warm and dry. Findings: No lesion or rash. Neurological:      Mental Status: He is alert and oriented to person, place, and time. Labs: I have personally reviewed pertinent lab results. Lab Results   Component Value Date    WBC 7.24 04/10/2023    HGB 17.1 (H) 04/10/2023    HCT 50.5 (H) 04/10/2023    MCV 94 04/10/2023     04/10/2023     Lab Results   Component Value Date    CALCIUM 8.8 05/05/2023    K 4.1 05/05/2023    CO2 23 05/05/2023     05/05/2023    BUN 14 05/05/2023    CREATININE 0.80 05/05/2023     Lab Results   Component Value Date     (H) 05/05/2023     Lab Results   Component Value Date    ALT 66 05/05/2023    AST 28 05/05/2023    ALKPHOS 67 05/05/2023       Imaging and other studies: I have personally reviewed pertinent reports.    and I have personally reviewed pertinent films in PACS    Pulmonary function tests May 10, 2023    Results:  FEV1/FVC Ratio: 82 %  Forced Vital Capacity: 3.51 L    76 % predicted  FEV1: 2.88 L     79 % predicted     After administration of bronchodilator   FEV1/FVC Ratio: 82%  FVC: 3.18 L, 69% predicted, -9% change  FEV1: 2.62 L, 72% predicted, -9% change     Lung volumes by body plethysmography:   Total Lung Capacity 92 % predicted   Residual volume 120 % predicted     DLCO corrected for patients hemoglobin level: 77 %     Interpretation:     • No obstructive airflow defect on spirometry.     • Spirometry suggests abnormal lung volumes. Nonspecific pattern.     • No significant response to the administration to bronchodilator per ATS standards     • Normal Lung volumes     • Normal diffusion capacity     • Flow volume loop is normal     CTA PE study 4/10/2023  IMPRESSION:     With mild compromise by respiratory motion, no acute pulmonary embolus.     Mild diffuse tree-in-bud nodularity compatible with bronchiolitis.     Mild dependent atelectasis in the lower lobes.     Small hiatal hernia.       Pulmonary function testing:   Ordered today       Sherl Libman, M.D.   [facility]

## 2023-09-06 NOTE — ASSESSMENT & PLAN NOTE
Patient is  a 29-year-old male, , cigarette smoker (up to 1 pack/day for the past 40 years), with a history of other occupational exposures over the past several years who was referred to the pulmonary clinic for evaluation of shortness of breath, cough, chest tightness and wheezing in the context of an abnormal CT showing some evidence of bronchiolitis. Patient was given empiric diagnosis of Welders lung disease by PCP. However the CT is not quite consistent with Welders lung disease or interstitial process. The tree-in-bud changes are very subtle. Groundglass was identified on chest x-ray but not discernibly noted on the CT of the chest.  Differential diagnosis includes COPD, asthma, bronchitis, allergies. Patient benefited from an [address] Have prescribed it. We did discuss potential bronchoscopy but at this time he is not demonstrating any significant infectious symptoms and he benefited from bronchodilator therapy. He will be due for lung cancer screening CT in April 2024. Overall constellation of symptoms suggestive of early COPD (PFTs did not show significant obstruction) versus asthma versus environmental allergies.   CT without evidence of interstitial lung disease    Plan  Continue Anoro Ellipta  Continue albuterol as needed  Continue efforts at smoking cessation  Self-paced exercise and weight loss is encouraged  Follow-up in 4 months

## 2023-09-06 NOTE — ASSESSMENT & PLAN NOTE
Patient is actively trying to quit smoking. He is reduced his cigarettes down to 10/day. I did offer him medication assistance but he declines at this time. We will continue to readdress in the future.

## 2023-09-06 NOTE — ASSESSMENT & PLAN NOTE
Patient was recently diagnosed with obstructive sleep apnea. He reports adherence to PAP therapy. Compliance report reviewed from August 7 through September 5. Patient using device 70% of the time. Using it for greater than 4 hours 33% of the time. Average use during days used 4 hours and 4 minutes.   Minimum pressure 4 cmH2O Max pressure 20 cmH2O, AHI 2.7

## 2023-09-30 ENCOUNTER — LAB (OUTPATIENT)
Dept: LAB | Facility: CLINIC | Age: 53
End: 2023-09-30
Payer: COMMERCIAL

## 2023-09-30 DIAGNOSIS — R73.01 IMPAIRED FASTING GLUCOSE: ICD-10-CM

## 2023-09-30 LAB
EST. AVERAGE GLUCOSE BLD GHB EST-MCNC: 131 MG/DL
HBA1C MFR BLD: 6.2 %

## 2023-09-30 PROCEDURE — 36415 COLL VENOUS BLD VENIPUNCTURE: CPT

## 2023-09-30 PROCEDURE — 83036 HEMOGLOBIN GLYCOSYLATED A1C: CPT

## 2023-10-05 NOTE — RESULT ENCOUNTER NOTE
Sugar is not terrible,but a little worse than before. Try to reduce carbs and sugars, increase exercise/movement.  Recheck HgbA1c in 6 mos

## 2023-11-28 ENCOUNTER — APPOINTMENT (EMERGENCY)
Dept: RADIOLOGY | Facility: HOSPITAL | Age: 53
End: 2023-11-28
Payer: COMMERCIAL

## 2023-11-28 ENCOUNTER — HOSPITAL ENCOUNTER (EMERGENCY)
Facility: HOSPITAL | Age: 53
Discharge: HOME | End: 2023-11-28
Attending: EMERGENCY MEDICINE
Payer: COMMERCIAL

## 2023-11-28 VITALS
TEMPERATURE: 97.9 F | RESPIRATION RATE: 20 BRPM | OXYGEN SATURATION: 94 % | DIASTOLIC BLOOD PRESSURE: 86 MMHG | HEART RATE: 77 BPM | SYSTOLIC BLOOD PRESSURE: 133 MMHG | BODY MASS INDEX: 37.13 KG/M2 | WEIGHT: 245 LBS | HEIGHT: 68 IN

## 2023-11-28 DIAGNOSIS — S49.90XA SHOULDER INJURY: ICD-10-CM
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
## 2023-11-28 PROCEDURE — 99283 EMERGENCY DEPT VISIT LOW MDM: CPT

## 2023-11-28 PROCEDURE — 99284 EMERGENCY DEPT VISIT MOD MDM: CPT | Performed by: EMERGENCY MEDICINE

## 2023-11-28 PROCEDURE — 73030 X-RAY EXAM OF SHOULDER: CPT

## 2023-11-28 NOTE — DISCHARGE INSTRUCTIONS
Follow up with orthopedics if symptoms persist  Apply ice 20 minutes every 2-3 hours while awake for pain  Take 650mg tylenol and 600mg motrin every 6 hours as needed for pain

## 2023-11-28 NOTE — ED PROVIDER NOTES
History  Chief Complaint   Patient presents with    Fall     On ice, injury to left shoulder     54-year-old presenting with left-sided shoulder pain. Patient says that he got out of his truck in the parking lot and slipped on ice and landed on his abducted left shoulder. Patient reports history of rotator cuff issues of the shoulder. Denies weakness numbness, head strike, anticoagulation. Prior to Admission Medications   Prescriptions Last Dose Informant Patient Reported? Taking? albuterol (ProAir HFA) 90 mcg/act inhaler   No No   Sig: Inhale 2 puffs every 6 (six) hours as needed for wheezing or shortness of breath   fluticasone (FLONASE) 50 mcg/act nasal spray   No No   Si sprays into each nostril daily   lisinopril (ZESTRIL) 5 mg tablet  Self Yes No   methylPREDNISolone 4 MG tablet therapy pack  Self No No   Sig: Use as directed on package   Patient not taking: Reported on 2023   olopatadine (PATANOL) 0.1 % ophthalmic solution  Self No No   Sig: Administer 1 drop to both eyes 2 (two) times a day   sildenafil (VIAGRA) 100 mg tablet  Self No No   Sig: Take 1 tablet (100 mg total) by mouth as needed for erectile dysfunction Take 1 tablet by mouth 1 hour prior to sex on empty stomach (2 hours after meal). Do not take within 4 hours of tamsulosin!   tamsulosin (FLOMAX) 0.4 mg  Self No No   Sig: Take 1 capsule (0.4 mg total) by mouth daily with dinner   umeclidinium-vilanterol (Anoro Ellipta) 62.5-25 mcg/actuation inhaler   No No   Sig: Inhale 1 puff daily      Facility-Administered Medications: None       Past Medical History:   Diagnosis Date    Chest pain        History reviewed. No pertinent surgical history. Family History   Problem Relation Age of Onset    Cancer Mother     Cancer Father     Heart disease Maternal Grandfather     Heart disease Paternal Grandfather     No Known Problems Daughter      I have reviewed and agree with the history as documented.     E-Cigarette/Vaping E-Cigarette Use Never User      E-Cigarette/Vaping Substances    Nicotine No     THC No     CBD No     Flavoring No     Other No     Unknown No      Social History     Tobacco Use    Smoking status: Every Day     Packs/day: 1.00     Types: Cigarettes     Start date: 1985    Smokeless tobacco: Never    Tobacco comments:     10 to 12 cigarettes a day he did  quit from [id number]   Vaping Use    Vaping Use: Never used   Substance Use Topics    Alcohol use: Yes     Comment: occasionally    Drug use: No       Review of Systems    Physical Exam  Physical Exam  Vitals and nursing note reviewed. Constitutional:       Appearance: Normal appearance. He is well-developed. HENT:      Head: Normocephalic and atraumatic. Eyes:      Conjunctiva/sclera: Conjunctivae normal.      Pupils: Pupils are equal, round, and reactive to light. Neck:      Trachea: No tracheal deviation. Cardiovascular:      Rate and Rhythm: Normal rate and regular rhythm. Heart sounds: Normal heart sounds. No murmur heard. Pulmonary:      Effort: Pulmonary effort is normal. No respiratory distress. Breath sounds: Normal breath sounds. No wheezing or rales. Abdominal:      General: Bowel sounds are normal. There is no distension. Palpations: Abdomen is soft. Tenderness: There is no abdominal tenderness. Musculoskeletal:         General: No deformity. Cervical back: Normal range of motion and neck supple. Comments: Lateral left shoulder tenderness   Skin:     General: Skin is warm and dry. Capillary Refill: Capillary refill takes less than 2 seconds. Neurological:      General: No focal deficit present. Mental Status: He is alert and oriented to person, place, and time. Sensory: No sensory deficit.    Psychiatric:         Mood and Affect: Mood normal.         Judgment: Judgment normal.       Vital Signs  ED Triage Vitals [11/28/23 1014]   Temperature Pulse Respirations Blood Pressure SpO2   97.9 °F (36.6 °C) 77 20 133/86 94 %      Temp Source Heart Rate Source Patient Position - Orthostatic VS BP Location FiO2 (%)   Temporal Monitor -- -- --      Pain Score       4           Vitals:    11/28/23 1014   BP: 133/86   Pulse: 77         Visual Acuity      ED Medications  Medications - No data to display    Diagnostic Studies  Results Reviewed       None                   XR shoulder 2+ views LEFT   Final Result by Leon Lo MD (11/28 1111)      No acute osseous abnormality. Workstation performed: [id number]                    Procedures  Procedures         ED Course                                             Medical Decision Making  60-year-old male presenting after fall onto left shoulder. He has mild tenderness at the lateral left shoulder. No chest wall tenderness, no evidence of pneumothorax. X-ray ordered to rule out fracture dislocation of the clinically doubtful. Viewed and interpreted independently me, no acute disease noted. Patient placed in sling. He is neurovascularly intact. Recommend orthopedic follow-up. Patient is comfortable with this plan, Tylenol ibuprofen for pain    Problems Addressed:  Fall, initial encounter: acute illness or injury  Shoulder injury: acute illness or injury    Amount and/or Complexity of Data Reviewed  Radiology: ordered and independent interpretation performed. Decision-making details documented in ED Course. Risk  OTC drugs. Disposition  Final diagnoses:   Fall, initial encounter   Shoulder injury     Time reflects when diagnosis was documented in both MDM as applicable and the Disposition within this note       Time User Action Codes Description Comment    11/28/2023 11:11 AM Pilo Bell Add [T52. XXXA] Fall, initial encounter     11/28/2023 11:11 AM Pilo Sims [S49.90XA] Shoulder injury           ED Disposition       ED Disposition   Discharge    Condition   Stable    Date/Time   Tue Nov 28, 2023 11:11 AM    Comment Jossy Donovan discharge to home/self care. Follow-up Information       Follow up With Specialties Details Why Contact Info    Jaxon Boucher DO Orthopedic Surgery Schedule an appointment as soon as possible for a visit   [address] [place] [address] [address]  2 [facility]  [phone]              Discharge Medication List as of 11/28/2023 11:53 AM        CONTINUE these medications which have NOT CHANGED    Details   albuterol (ProAir HFA) 90 mcg/act inhaler Inhale 2 puffs every 6 (six) hours as needed for wheezing or shortness of breath, Starting Wed 9/6/2023, Normal      fluticasone (FLONASE) 50 mcg/act nasal spray 2 sprays into each nostril daily, Starting Wed 9/6/2023, Normal      lisinopril (ZESTRIL) 5 mg tablet Starting Thu 5/4/2023, Historical Med      methylPREDNISolone 4 MG tablet therapy pack Use as directed on package, Normal      olopatadine (PATANOL) 0.1 % ophthalmic solution Administer 1 drop to both eyes 2 (two) times a day, Starting Wed 5/3/2023, Normal      sildenafil (VIAGRA) 100 mg tablet Take 1 tablet (100 mg total) by mouth as needed for erectile dysfunction Take 1 tablet by mouth 1 hour prior to sex on empty stomach (2 hours after meal).   Do not take within 4 hours of tamsulosin!, Starting Wed 10/5/2022, Normal      tamsulosin (FLOMAX) 0.4 mg Take 1 capsule (0.4 mg total) by mouth daily with dinner, Starting Mon 1/30/2023, Normal      umeclidinium-vilanterol (Anoro Ellipta) 62.5-25 mcg/actuation inhaler Inhale 1 puff daily, Starting Wed 9/6/2023, Until Fri 10/6/2023, Normal                 PDMP Review       None            ED Provider  Electronically Signed by             Ruma Lowe DO  11/28/23 6840

## 2023-11-28 NOTE — Clinical Note
Alfonso Duong was seen and treated in our emergency department on 11/28/2023. No work until cleared by Family Doctor/Orthopedics        Diagnosis:     Trudy Alfonso  . He may return on this date: If you have any questions or concerns, please don't hesitate to call.       Elliot You, DO    ______________________________           _______________          _______________  Hospital Representative                              Date                                Time
Heber Haley was seen and treated in our emergency department on 11/28/2023. No work until cleared by Family Doctor/Orthopedics        Diagnosis:     Callie Schwab  . He may return on this date: If you have any questions or concerns, please don't hesitate to call.       Marian Huffman, DO    ______________________________           _______________          _______________  Hospital Representative                              Date                                Time
bench

## 2023-11-29 ENCOUNTER — OFFICE VISIT (OUTPATIENT)
Dept: OBGYN CLINIC | Facility: CLINIC | Age: 53
End: 2023-11-29
Payer: COMMERCIAL

## 2023-11-29 ENCOUNTER — APPOINTMENT (OUTPATIENT)
Dept: RADIOLOGY | Facility: CLINIC | Age: 53
End: 2023-11-29
Payer: COMMERCIAL

## 2023-11-29 VITALS
HEART RATE: 76 BPM | WEIGHT: 241 LBS | BODY MASS INDEX: 36.53 KG/M2 | TEMPERATURE: 98.4 F | SYSTOLIC BLOOD PRESSURE: 135 MMHG | HEIGHT: 68 IN | DIASTOLIC BLOOD PRESSURE: 94 MMHG

## 2023-11-29 DIAGNOSIS — M25.512 LEFT SHOULDER PAIN, UNSPECIFIED CHRONICITY: ICD-10-CM

## 2023-11-29 DIAGNOSIS — S49.90XA SHOULDER INJURY: ICD-10-CM

## 2023-11-29 DIAGNOSIS — M25.512 LEFT SHOULDER PAIN, UNSPECIFIED CHRONICITY: Primary | ICD-10-CM

## 2023-11-29 PROCEDURE — 99204 OFFICE O/P NEW MOD 45 MIN: CPT | Performed by: STUDENT IN AN ORGANIZED HEALTH CARE EDUCATION/TRAINING PROGRAM

## 2023-11-29 PROCEDURE — 73020 X-RAY EXAM OF SHOULDER: CPT

## 2023-11-29 NOTE — PROGRESS NOTES
Ortho Sports Medicine Shoulder New Patient Visit     Assesment:   48 y.o. male with left shoulder pain, weakness and limited ROM ongoing for 1 day s/p fall. Exam concerning for a rotator cuff tear. Plan:  I reviewed the history, exam, and imaging with the patient in clinic. The patient has significant weakness in the left shoulder after a traumatic injury yesterday. He denies any symptoms prior to the fall. I discussed with the patient that based on his weakness in the setting of a fall that I am concerned for a rotator cuff tear. I recommended getting an MRI of the left shoulder to evaluate for rotator cuff pathology. I discussed that in the meantime the patient can use the sling for comfort but that he should continue to work on wrist and elbow range of motion as well as passive shoulder range of motion to minimize stiffness. Patient demonstrated understanding of our discussion was agreed with the plan. All of his questions were answered. I placed an order for a left shoulder MRI. I will see the patient back in clinic after the MRI is complete to discuss the results and further treatment options. Conservative treatment:  Ice to shoulder 1-2 times daily, for 20 minutes at a time. Home exercise program for shoulder, including gentle ROM exercises as demonstrated in the office. Instructions given to patient of what exercises to perform. Imaging: All imaging from today was reviewed by myself and explained to the patient. We will obtain an MRI of the shoulder to rule out RTC tear. Follow up in 1-2 weeks for MRI review. Will make a definitive treatment plan based on the results of the MRI. Injection:  No Injection planned at this time. Surgery:   No surgery is recommended at this point, continue with conservative treatment plan as noted. Follow up:  Return for 1 week following MRI. Yanni Jameson       Chief Complaint   Patient presents with    Left Shoulder - Pain         History of Present Illness: The patient is a 48 y.o. RHD male seen in clinic for left shoulder pain. The pain started yesterday on 11/29/23. The mechanism of injury was a fall onto his left side with the left arm fully abducted. Patient felt immediate pain that subsided briefly but then returned severely. The patient was unable to appreciate any pops. The pain is now located laterally and is associated with limited ROM. The patient states he can move the wrist and elbow but feels the shoulder is locked. The patient characterizes the intensity of pain as a 10 out of 10 at worst. Symptoms are aggravated by movement. The patient has tried a sling, ice and NSAIDs. Symptoms have not improved since the injury. Patient has no history of prior injury, or surgery. Patient denies any neck pain or pain below the elbow. Patient also denies any numbness or tingling in the left upper extremity. The patient has the following co-morbidities: n/a    Shoulder Surgical History:  None    Past Medical, Social and Family History:  Past Medical History:   Diagnosis Date    Chest pain      History reviewed. No pertinent surgical history. Allergies   Allergen Reactions    Pollen Extract Itching     Current Outpatient Medications on File Prior to Visit   Medication Sig Dispense Refill    albuterol (ProAir HFA) 90 mcg/act inhaler Inhale 2 puffs every 6 (six) hours as needed for wheezing or shortness of breath 8.5 g 3    fluticasone (FLONASE) 50 mcg/act nasal spray 2 sprays into each nostril daily 16 g 3    lisinopril (ZESTRIL) 5 mg tablet       olopatadine (PATANOL) 0.1 % ophthalmic solution Administer 1 drop to both eyes 2 (two) times a day 5 mL 3    sildenafil (VIAGRA) 100 mg tablet Take 1 tablet (100 mg total) by mouth as needed for erectile dysfunction Take 1 tablet by mouth 1 hour prior to sex on empty stomach (2 hours after meal).   Do not take within 4 hours of tamsulosin! 10 tablet 12    tamsulosin (FLOMAX) 0.4 mg Take 1 capsule (0.4 mg total) by mouth daily with dinner 30 capsule 11    umeclidinium-vilanterol (Anoro Ellipta) 62.5-25 mcg/actuation inhaler Inhale 1 puff daily 60 blister 3    methylPREDNISolone 4 MG tablet therapy pack Use as directed on package (Patient not taking: Reported on 9/6/2023) 21 tablet 0     No current facility-administered medications on file prior to visit. Social History     Socioeconomic History    Marital status: /Civil Union     Spouse name: Not on file    Number of children: Not on file    Years of education: Not on file    Highest education level: Not on file   Occupational History    Not on file   Tobacco Use    Smoking status: Every Day     Packs/day: 1.00     Types: Cigarettes     Start date: 5    Smokeless tobacco: Never    Tobacco comments:     10 to 12 cigarettes a day he did  quit from [id number]   Vaping Use    Vaping Use: Never used   Substance and Sexual Activity    Alcohol use: Yes     Comment: occasionally    Drug use: No    Sexual activity: Yes   Other Topics Concern    Not on file   Social History Narrative    Not on file     Social Determinants of Health     Financial Resource Strain: Not on file   Food Insecurity: Not on file   Transportation Needs: Not on file   Physical Activity: Not on file   Stress: Not on file   Social Connections: Not on file   Intimate Partner Violence: Not on file   Housing Stability: Not on file       I have reviewed the past medical, surgical, social and family history, medications and allergies as documented in the EMR. Review of systems: ROS is negative other than that noted in the HPI. Constitutional: Negative for fatigue and fever. Physical Exam:    Blood pressure 135/94, pulse 76, temperature 98.4 °F (36.9 °C), temperature source Tympanic, height 5' 8" (1.727 m), weight 109 kg (241 lb).     General/Constitutional: NAD, well developed, well nourished  HENT: Normocephalic, atraumatic  CV: Intact distal pulses, regular rate  Resp: No respiratory distress or labored breathing  GI: Soft and non-tender   Lymphatic: No lymphadenopathy palpated  Neuro: Alert and Oriented x 3, no focal deficits  Psych: Normal mood, normal affect, normal judgement, normal behavior  Skin: Warm, dry, no rashes, no erythema      Focused Left shoulder exam:  No paracervical tenderness. No cervical tenderness. No pain with neck flexion, extension, side-to-side bending, or rotation. The skin is intact without evidence of erythema or ecchymosis. Symmetric shoulders with no evidence of supraspinatus or infraspinatus muscle atrophy. There is no evidence neurologic medial or lateral scapular winging. Normal scapular positioning. Palpation demonstrates tenderness over the [facility] joint and lateral border of the acromion, but no tenderness over the SC joint, bilateral clavicle, or bicipital groove. Shoulder ROM demonstrates 10 degrees of active and 120 degrees of passive forward elevation. External rotation with the arms at the side demonstrates 30 degree of passive motion. Internal rotation is to waistband. Exam limited by pain. Unable to perform empty can position for strength testing. Strength testing demonstrates 3/5 with resisted external rotation with the arm at the side. 5/5 strength of the subscapularis and a negative belly press. Provocative testing unable to perform secondary to pain and limted ROM. UE NV Exam: +2 Radial pulses bilaterally. Fingers are warm and well-perfused. Sensation intact to light touch C5-T1 bilaterally, Radial/median/ulnar nerve motor intact      Shoulder Imaging    Radiographs of the left shoulder were obtained on 11/28 and 11/29 and reviewed with the patient. Based on my independent evaluation, the imaging shows mild AC joint osteoarthritis but no acute fracture or dislocation.       Scribe Attestation      I,:  Alysha Philip PA-C am acting as a scribe while in the presence of the attending physician.:       I,:  Ayana Green MD personally performed the services described in this documentation    as scribed in my presence.:

## 2023-12-06 ENCOUNTER — HOSPITAL ENCOUNTER (OUTPATIENT)
Dept: MRI IMAGING | Facility: HOSPITAL | Age: 53
Discharge: HOME | End: 2023-12-06
Payer: COMMERCIAL

## 2023-12-06 ENCOUNTER — HOSPITAL ENCOUNTER (OUTPATIENT)
Dept: RADIOLOGY | Facility: HOSPITAL | Age: 53
Discharge: HOME | End: 2023-12-06

## 2023-12-06 DIAGNOSIS — M25.512 LEFT SHOULDER PAIN, UNSPECIFIED CHRONICITY: ICD-10-CM

## 2023-12-06 DIAGNOSIS — N52.9 ERECTILE DYSFUNCTION, UNSPECIFIED ERECTILE DYSFUNCTION TYPE: ICD-10-CM

## 2023-12-06 PROCEDURE — 73221 MRI JOINT UPR EXTREM W/O DYE: CPT

## 2023-12-06 PROCEDURE — G1004 CDSM NDSC: HCPCS

## 2023-12-06 RX ORDER — SILDENAFIL 100 MG/1
TABLET, FILM COATED ORAL
Qty: 10 TABLET | Refills: 0 | Status: SHIPPED | OUTPATIENT
Start: 2023-12-06

## 2023-12-13 ENCOUNTER — TELEPHONE (OUTPATIENT)
Age: 53
End: 2023-12-13

## 2023-12-13 ENCOUNTER — OFFICE VISIT (OUTPATIENT)
Dept: OBGYN CLINIC | Facility: CLINIC | Age: 53
End: 2023-12-13
Payer: COMMERCIAL

## 2023-12-13 VITALS
SYSTOLIC BLOOD PRESSURE: 118 MMHG | HEIGHT: 68 IN | WEIGHT: 250 LBS | HEART RATE: 92 BPM | TEMPERATURE: 98.9 F | BODY MASS INDEX: 37.89 KG/M2 | DIASTOLIC BLOOD PRESSURE: 82 MMHG

## 2023-12-13 DIAGNOSIS — S46.012A TRAUMATIC INCOMPLETE TEAR OF LEFT ROTATOR CUFF, INITIAL ENCOUNTER: Primary | ICD-10-CM

## 2023-12-13 PROCEDURE — 99214 OFFICE O/P EST MOD 30 MIN: CPT | Performed by: STUDENT IN AN ORGANIZED HEALTH CARE EDUCATION/TRAINING PROGRAM

## 2023-12-13 RX ORDER — MELOXICAM 15 MG/1
15 TABLET ORAL DAILY
Qty: 30 TABLET | Refills: 0 | Status: SHIPPED | OUTPATIENT
Start: 2023-12-13

## 2023-12-13 NOTE — PROGRESS NOTES
Ortho Sports Medicine Shoulder New Patient Visit     Assesment:   48 y.o. male with left shoulder pain, weakness and limited ROM after a fall 2 weeks ago with MRI showing a partial thickness supraspinatus tear    Plan:  I reviewed the history, exam, and imaging with the patient in clinic today. I reviewed the results of the MRI which do show some tendinopathy and a partial-thickness tear of the supraspinatus which is likely contributing to his pain, weakness, and decreased range of motion since the injury. I discussed both conservative and surgical management of his injury focusing on conservative management at this point. I did discuss that partial thickness rotator cuff tears usually respond well to conservative management and that I did not expect that he would need surgical intervention for this. I discussed with the patient that at this point he can start working on physical therapy to minimize any stiffness of the shoulder. I discussed with the patient that he should continue use of meloxicam to help with pain control. Discontinue the use of the sling at this time. I did provide him with a work note stating that he will be out of work for 6 weeks until repeat evaluation. Patient demonstrated understanding of our discussion was agreed with the plan. All his questions were answered. I did him with a prescription for physical therapy and meloxicam as well as a work note. I will see the patient back in 6 to 8 weeks for repeat evaluation. I did discuss that if the symptoms persist we can also try a corticosteroid injection as well. He can reach out to clinic with any question concerns anytime. Conservative treatment:  Ice to shoulder 1-2 times daily, for 20 minutes at a time. Encouraged patient to begin gentle range of motion to delay frozen shoulder  Prescription for physical therapy.  Focus on range of motion, shoulder strengthening, scapular stabilization  A note was provided for work    Imaging: All imaging from today was reviewed by myself and explained to the patient. Injection:  No Injection planned at this time. Surgery:  No surgery is recommended at this point, continue with conservative treatment plan as noted. Follow up:  Return in about 6 weeks (around 1/24/2024) for Recheck. Chief Complaint   Patient presents with    Left Shoulder - Follow-up     MRI report         History of Present Illness: The patient is a 48 y.o. RHD male seen in clinic for left shoulder pain. The patient was last seen on 11/29/23 after he experienced a fall onto his left arm. The patient reports mild improvements since his last visit. He states that he has been using the sling. However, he does remove the sling for household chores. He reports improvements in range of motion. He states that he uses active assistive range of motion exercises. He denies numbness or tingling. The patient has the following co-morbidities: n/a    Shoulder Surgical History:  None    Past Medical, Social and Family History:  Past Medical History:   Diagnosis Date    Chest pain      History reviewed. No pertinent surgical history.   Allergies   Allergen Reactions    Pollen Extract Itching     Current Outpatient Medications on File Prior to Visit   Medication Sig Dispense Refill    albuterol (ProAir HFA) 90 mcg/act inhaler Inhale 2 puffs every 6 (six) hours as needed for wheezing or shortness of breath 8.5 g 3    fluticasone (FLONASE) 50 mcg/act nasal spray 2 sprays into each nostril daily 16 g 3    lisinopril (ZESTRIL) 5 mg tablet       olopatadine (PATANOL) 0.1 % ophthalmic solution Administer 1 drop to both eyes 2 (two) times a day 5 mL 3    sildenafil (VIAGRA) 100 mg tablet 1 TABLET 1 HOUR PRIOR TO SEX ON EMPTY STOMACH(2HRS AFTER MEAL) DO NOT TAKE W/I 4HRS OF TAMSULOSIN 10 tablet 0    tamsulosin (FLOMAX) 0.4 mg Take 1 capsule (0.4 mg total) by mouth daily with dinner 30 capsule 11    umeclidinium-vilanterol (Anoro Ellipta) 62.5-25 mcg/actuation inhaler Inhale 1 puff daily 60 blister 3    methylPREDNISolone 4 MG tablet therapy pack Use as directed on package (Patient not taking: Reported on 9/6/2023) 21 tablet 0     No current facility-administered medications on file prior to visit. Social History     Socioeconomic History    Marital status: /Civil Union     Spouse name: Not on file    Number of children: Not on file    Years of education: Not on file    Highest education level: Not on file   Occupational History    Not on file   Tobacco Use    Smoking status: Every Day     Current packs/day: 1.00     Average packs/day: 1 pack/day for 38.9 years (38.9 ttl pk-yrs)     Types: Cigarettes     Start date: 5    Smokeless tobacco: Never    Tobacco comments:     10 to 12 cigarettes a day he did  quit from [id number]   Vaping Use    Vaping status: Never Used   Substance and Sexual Activity    Alcohol use: Yes     Comment: occasionally    Drug use: No    Sexual activity: Yes   Other Topics Concern    Not on file   Social History Narrative    Not on file     Social Determinants of Health     Financial Resource Strain: Not on file   Food Insecurity: Not on file   Transportation Needs: Not on file   Physical Activity: Not on file   Stress: Not on file   Social Connections: Not on file   Intimate Partner Violence: Not on file   Housing Stability: Not on file       I have reviewed the past medical, surgical, social and family history, medications and allergies as documented in the EMR. Review of systems: ROS is negative other than that noted in the HPI. Constitutional: Negative for fatigue and fever. Physical Exam:    Blood pressure 118/82, pulse 92, temperature 98.9 °F (37.2 °C), temperature source Tympanic, height 5' 8" (1.727 m), weight 113 kg (250 lb).     General/Constitutional: NAD, well developed, well nourished  HENT: Normocephalic, atraumatic  CV: Intact distal pulses, regular rate  Resp: No respiratory distress or labored breathing  GI: Soft and non-tender   Lymphatic: No lymphadenopathy palpated  Neuro: Alert and Oriented x 3, no focal deficits  Psych: Normal mood, normal affect, normal judgement, normal behavior  Skin: Warm, dry, no rashes, no erythema      Focused Left shoulder exam:  No paracervical tenderness. No cervical tenderness. No pain with neck flexion, extension, side-to-side bending, or rotation. The skin is intact without evidence of erythema or ecchymosis. Symmetric shoulders with no evidence of supraspinatus or infraspinatus muscle atrophy. There is no evidence neurologic medial or lateral scapular winging. Normal scapular positioning. Palpation demonstrates tenderness [facility] the [facility] joint and lateral border of the acromion, but no tenderness over the SC joint, bilateral clavicle, or bicipital groove. Shoulder ROM demonstrates 90 degrees of active and 170 degrees of passive forward elevation. External rotation with the arms at the side demonstrates 60 degree of passive motion. Internal rotation is to waistband. Exam limited by pain. Unable to perform empty can position for strength testing. Strength testing demonstrates 4/5 with empty can, 5/5 with resisted external rotation with the arm at the side. 5/5 strength of the subscapularis and a negative belly press. Provocative testing unable to perform secondary to pain and limted ROM. UE NV Exam: +2 Radial pulses bilaterally. Fingers are warm and well-perfused. Sensation intact to light touch C5-T1 bilaterally, Radial/median/ulnar nerve motor intact      Shoulder Imaging    Radiographs of the left shoulder were obtained on 11/28 and 11/29 and reviewed with the patient. Based on my independent evaluation, the imaging shows mild AC joint osteoarthritis but no acute fracture or dislocation. MRI of the left shoulder from 6/20/2023 was reviewed with the patient.   Based on my independent evaluation, the MRI shows tendinopathy and a partial-thickness bursal sided tear of the supraspinatus. No significant tendinopathy or tears of the subscapularis, infraspinatus, or teres minor. No rotator cuff muscle atrophy noted.       Scribe Attestation      I,:  Roxane Kern am acting as a scribe while in the presence of the attending physician.:       I,:  Luis E Lee MD personally performed the services described in this documentation    as scribed in my presence.:

## 2023-12-13 NOTE — TELEPHONE ENCOUNTER
Caller: Tanya Fletcher from [facility]      Doctor: Dr. Marilyn Llanos    Reason for call: Tanya Fletcher asking if referral can state Physical  Therapy/occupational Therapy. She is asking if the order can be changed.      Call back#: [phone]

## 2023-12-13 NOTE — LETTER
December 13, 2023     Patient: Socorro Franco  YOB: 1970  Date of Visit: 12/13/2023      To Whom it May Concern:    Socorro Franco is under my professional care. Carisa Chakraborty was seen in my office on 12/13/2023. Carisa Chakraborty is not cleared for work at this time. He will be re-evaluated in 6 weeks. If you have any questions or concerns, please don't hesitate to call.          Sincerely,          Jaqueline Mejia MD        CC: No Recipients

## 2023-12-19 ENCOUNTER — EVALUATION (OUTPATIENT)
Dept: OCCUPATIONAL THERAPY | Facility: CLINIC | Age: 53
End: 2023-12-19
Payer: COMMERCIAL

## 2023-12-19 DIAGNOSIS — S46.012D TRAUMATIC INCOMPLETE TEAR OF LEFT ROTATOR CUFF, SUBSEQUENT ENCOUNTER: Primary | ICD-10-CM

## 2023-12-19 PROCEDURE — 97535 SELF CARE MNGMENT TRAINING: CPT

## 2023-12-19 PROCEDURE — G0283 ELEC STIM OTHER THAN WOUND: HCPCS

## 2023-12-19 PROCEDURE — 97014 ELECTRIC STIMULATION THERAPY: CPT

## 2023-12-19 PROCEDURE — 97166 OT EVAL MOD COMPLEX 45 MIN: CPT

## 2023-12-19 NOTE — PROGRESS NOTES
OT Evaluation     Today's date: 2023  Patient name: Christiano Monroe  : 1970  MRN: [id number]  Referring provider: Lawrence Gabriel MD  Dx:   Encounter Diagnosis     ICD-10-CM    1. Traumatic incomplete tear of left rotator cuff, subsequent encounter  S46.012D                      Assessment  Assessment details: Patient presenting with OP OT services with a dx of left supraspinatus tear shoulder pain. Patient reports injury began on 2023. Patient states injury occurred when he fell. Patient initially went to seek medical care on 2023. Patient's last appointment with Ortho was on 2023. Patient had MRI completed on 2023. Patient returned to Ortho on 2023. Patient next follow-up with Ortho is on 2023. Patient is expected to be off work for the next six weeks.  Patient is a  for a living. Patient is right hand dominant. Patient reports symptoms include pain with movement and difficulty moving left shoulder. Patient was provided a prescription for antiinflammatory.     As per recent Ortho appointment:  MRI which do show some tendinopathy and a partial-thickness tear of the supraspinatus which is likely contributing to his pain, weakness, and decreased range of motion since the injury.  I discussed both conservative and surgical management of his injury focusing on conservative management at this point.  I did discuss that partial thickness rotator cuff tears usually respond well to conservative management and that I did not expect that he would need surgical intervention for this.  I discussed with the patient that at this point he can start working on physical therapy to minimize any stiffness of the shoulder.  I discussed with the patient that he should continue use of meloxicam to help with pain control.  Discontinue the use of the sling at this time.  I did provide him with a work note stating that he will be out of work for 6 weeks until repeat evaluation.   Patient demonstrated understanding of our discussion was agreed with the plan.  All his questions were answered.  I did him with a prescription for physical therapy and meloxicam as well as a work note.  I will see the patient back in 6 to 8 weeks for repeat evaluation.  I did discuss that if the symptoms persist we can also try a corticosteroid injection as well.  He can reach out to clinic with any question concerns anytime.  Impairments: abnormal coordination, abnormal or restricted ROM and impaired physical strength    Symptom irritability: moderateUnderstanding of Dx/Px/POC: good   Prognosis: good    Goals  STGs    Pt will increase  strength by 5-10#. - Not Met    Pt will increase shoulder strength by 1/2 grade. - Not Met    Pt will increase shoulder ROM by 50%. - Not Met    Pt will report decrease in morning stiffness by 25%. - Not Met    Independent with HEP. - Not Met    LTGs     Pt will increase  strength by an additional 5-10#. - Not Met    Pt will increase shoulder strength by 1-2 grade. - Not Met    Pt will increase shoulder ROM to WFL. - Not Met    Pt will report an increase in ADL/IADL participation. - Not Met    Pt will report no more than a 0/10 pain with activity - Not Met          Plan  Plan details: Patient has presenting to OP OT services with a dx of left shoulder rotator cuff tear. Patient demonstrating decreased pain, decreased strength, decreased ROM and decreased activity. Pt would benefit from continued Occupational Therapy services two times per week for 4 weeks to return to prior level of function and achieve all established goals. Thank you for the referral!    Patient would benefit from: OT eval and skilled occupational therapy  Referral necessary: Yes  Planned modality interventions: ultrasound, unattended electrical stimulation, thermotherapy: hydrocollator packs, cryotherapy and thermotherapy: paraffin bath  Planned therapy interventions: massage, manual therapy, joint  mobilization, patient education, strengthening, stretching, fine motor coordination training, home exercise program, neuromuscular re-education, self care, therapeutic exercise and therapeutic activities  Frequency: 2x week  Duration in visits: 8  Duration in weeks: 4  Treatment plan discussed with: patient        Subjective Evaluation    History of Present Illness  Date of onset: 2023  Mechanism of injury: trauma  Mechanism of injury: Left Rotator Cuff Tear s/p fall          Not a recurrent problem   Quality of life: good    Patient Goals  Patient goals for therapy: increased strength, increased motion, independence with ADLs/IADLs, return to work and decreased pain    Pain  Current pain rating: 3  At best pain rating: 3  At worst pain ratin  Location: Left Shoulder  Relieving factors: ice and medications    Social Support    Employment status: working  Hand dominance: right      Diagnostic Tests  MRI studies: abnormal  Treatments  Current treatment: occupational therapy        Objective     Neurological Testing     Sensation     Shoulder   Left Shoulder   Intact: light touch    Right Shoulder   Intact: Light touch    Active Range of Motion   Left Shoulder   Flexion: 48 degrees   Extension: 42 degrees   Abduction: 42 degrees   Adduction: WFL  External rotation 0°: 55 degrees   Internal rotation 0°: WFL    Right Shoulder   Normal active range of motion    Left Elbow   Normal active range of motion    Right Elbow   Normal active range of motion    Left Wrist   Normal active range of motion    Right Wrist   Normal active range of motion    Left Thumb     Opposition: WNL    Right Thumb   Opposition: WNL    Additional Active Range of Motion Details  Patient demonstrating with a decrease in left shoulder ROM compared to unaffected side.    Patient presenting with a 3 inch difference in IR BHB as compared to right.     Strength/Myotome Testing     Left Shoulder     Planes of Motion   Flexion: 2-   Extension: 3    Abduction: 2-   External rotation at 0°: 3-   Internal rotation at 0°: 3     Right Shoulder   Normal muscle strength    Left Elbow   Flexion: 4  Extension: 4  Forearm supination: 4  Forearm pronation: 4    Right Elbow   Normal strength    Left Wrist/Hand   Wrist extension: 4  Wrist flexion: 4  Radial deviation: 4  Ulnar deviation: 4     (2nd hand position)     Trial 1: 55    Right Wrist/Hand   Normal wrist strength     (2nd hand position)     Trial 1: 80    Additional Strength Details  Patient presenting with decreased strength of left shoulder compared to unaffected side.     Patient demonstrating with a decrease in wrist,  strength compared to unaffected upper extremity.        Patient tolerated session well. Patient and therapist discussed exercises as per initial HEP. Patient verbalized understanding of education and demonstrated proper technique. Therapist will make increases as tolerated. Continue with POC             Precautions: Left Shoulder Pain  Initial Evaluation completed on: 12/19/2023  Re-Evaluation needs to be completed before: 01/19/2024  Insurance: [employer]  FOTO: 12/19/2023  Auth Visits: N/A          Manuals 12/19/2023       PROM Shoulder Supine All Planes                                Neuro Re-Ed  12/19/2023                                                               Ther Ex 12/19/2023       Pendulums  CW  CCW  FF   HEP  HEP  HEP       Pulleys  FF  ABD        Finger Ladder        Shoulder  Shrugs  Rolls  Retraction       HEP       Table Slides  FF  ABD   HEP       Supine SPC  FF  CP  ER  ABD   HEP       Digi-Flex        UBE Machine        TB  Sh Ext  Triceps Ext  ADD  Retraction  IR  ER  Biceps Curl        Doorway Stretch        Wall Slides  FF ABD                Ther Activity 12/19/2023                                               Modalities 12/19/2023       Ultrasound        MH        CP        E-STIM Shoulder MH at end of session 15'

## 2023-12-27 ENCOUNTER — OFFICE VISIT (OUTPATIENT)
Dept: OCCUPATIONAL THERAPY | Facility: CLINIC | Age: 53
End: 2023-12-27
Payer: COMMERCIAL

## 2023-12-27 DIAGNOSIS — S46.012D TRAUMATIC INCOMPLETE TEAR OF LEFT ROTATOR CUFF, SUBSEQUENT ENCOUNTER: Primary | ICD-10-CM

## 2023-12-27 PROCEDURE — 97530 THERAPEUTIC ACTIVITIES: CPT

## 2023-12-27 NOTE — PROGRESS NOTES
Daily Note     Today's date: 2023  Patient name: Christiano Monroe  : 1970  MRN: [id number]  Referring provider: Lawrence Gabriel MD  Dx:   Encounter Diagnosis     ICD-10-CM    1. Traumatic incomplete tear of left rotator cuff, subsequent encounter  S46.012D                      Subjective: Yea, Im feeling better.       Objective: See treatment diary below      Assessment: Tolerated treatment well. Patient demonstrated fatigue post treatment, exhibited good technique with therapeutic exercises, and would benefit from continued OT Pt presents with increased ROM and feeling better compared to previous session. Pt participated in skilled OT this date with focus to increased ROM, min UE strength/endurance, GMC/GMS, and activity tolerance/endurance, for increased safety/engagement in ADL/IADL tasks. Pt states minimal quick pain at about shoulder height when moving arm through full ROM on concentric/eccentric movements. Therapist educating pt on advancing to isometrics at home as well as wall slides as able. Therapist also suggesting supine cane exercises on difficult or painful days. Discomfort noted in IR during manual prolonged stretching this date.      Plan: Continue per plan of care.  Progress treatment as tolerated.   Progress treament per protocol.         Precautions: Left Shoulder Pain  Initial Evaluation completed on: 2023  Re-Evaluation needs to be completed before: 2024  Insurance: [employer]  FOTO: 2023  Auth Visits: N/A          Manuals 2023      PROM Shoulder Supine All Planes  All planes 30 sec x 3 - Discomfort in IR                              Neuro Re-Ed  2023                                                              Ther Ex 2023      Pendulums  CW  CCW  FF   HEP  HEP  HEP       Pulleys  FF  ABD  Seated  2 min  2 min      Finger Ladder  FF  Abd    30 x 4  30 x 4      Shoulder  Shrugs  Rolls  Retraction       HEP        Table Slides  FF  ABD   HEP Wall Slides  5 sec x 10 w/ball      Supine SPC  FF  CP  ER  ABD  Extension   HEP   X 15  X 15  5 sec x 10    Stance x 15      Digi-Flex        UBE Machine        TB  Sh Ext  Triceps Ext  ADD  Retraction  IR  ER  Biceps Curl        Doorway Stretch        Wall Slides  FF ABD        Isometrics  FF  Ext  ER  IR  Abd  5 sec hold  X 5  X 5  X 5  X 5  X 5      Ther Activity 12/19/2023 12/27/2023                                              Modalities 12/19/2023 12/27/2023      Ultrasound        MH        CP        E-STIM Shoulder MH at end of session 15' MH at end of session 10'

## 2023-12-29 ENCOUNTER — OFFICE VISIT (OUTPATIENT)
Dept: OCCUPATIONAL THERAPY | Facility: CLINIC | Age: 53
End: 2023-12-29
Payer: COMMERCIAL

## 2023-12-29 DIAGNOSIS — S46.012D TRAUMATIC INCOMPLETE TEAR OF LEFT ROTATOR CUFF, SUBSEQUENT ENCOUNTER: Primary | ICD-10-CM

## 2023-12-29 PROCEDURE — 97014 ELECTRIC STIMULATION THERAPY: CPT

## 2023-12-29 PROCEDURE — G0283 ELEC STIM OTHER THAN WOUND: HCPCS

## 2023-12-29 PROCEDURE — 97110 THERAPEUTIC EXERCISES: CPT

## 2023-12-29 PROCEDURE — 97140 MANUAL THERAPY 1/> REGIONS: CPT

## 2023-12-29 NOTE — PROGRESS NOTES
"Daily Note     Today's date: 2023  Patient name: Christiano Monroe  : 1970  MRN: [id number]  Referring provider: Lawrence Gabriel MD  Dx:   Encounter Diagnosis     ICD-10-CM    1. Traumatic incomplete tear of left rotator cuff, subsequent encounter  S46.012D                      Subjective: \"It is a little sore.\"      Objective: See treatment diary below      Assessment: Tolerated treatment well. Patient exhibited good technique with therapeutic exercises and would benefit from continued OT. Patient completed UBE machine this date to complete AAROM to increase shoulder endurance and tolerance. Patient reports mild discomfort during session. Therapist held IR stretching this date due to discomfort at last session.       Plan: Continue per plan of care.  Progress treatment as tolerated.       Precautions: Left Shoulder Pain  Initial Evaluation completed on: 2023  Re-Evaluation needs to be completed before: 2024  Insurance: [employer]  FOTO: 2023  Auth Visits: N/A          Manuals 2023     PROM Shoulder Supine All Planes  All planes 30 sec x 3 - Discomfort in IR FF 5 Min  ABD 5 Min                             Neuro Re-Ed  2023                                                             Ther Ex 2023     Pendulums  CW  CCW  FF   HEP  HEP  HEP       Pulleys  FF  ABD  Seated  2 min  2 min Seated  2 min  2 min     Finger Ladder  FF  Abd    30 x 4  30 x 4   30 x 4  30 x 4     Shoulder  Shrugs  Rolls  Retraction       HEP       Table Slides  FF  ABD   HEP Wall Slides  5 sec x 10 w/ball Wall Slides  5 sec x 10 w/ball     Supine SPC  FF  CP  ER  ABD  Extension   HEP   X 15  X 15  5 sec x 10    Stance x 15   X 15  X 15  5 sec x 10    Stance x 15     Digi-Flex        UBE Machine   10 Min L 60     TB  Sh Ext  Triceps Ext  ADD  Retraction  IR  ER  Biceps Curl        Doorway Stretch        Wall Slides  FF ABD      " Assessment & Plan     Diagnosis:  (Z20.822) Suspected COVID-19 virus infection  (primary encounter diagnosis)  Plan: nirmatrelvir and ritonavir (PAXLOVID)         therapy pack    (J45.21) Mild intermittent asthma with acute exacerbation  Plan:  predniSONE (DELTASONE) 20 MG tablet      Medical Decision Making:  Jazmine Powell is a 48 year old female who presents for evaluation of cough, fever, wheezing and COVID-19 concern.  Symptoms are consistent with an upper respiratory tract infection.  There is no signs at this point of serious bacterial infection such as OM, RPA, epiglottitis, PTA, strep pharyngitis, pneumonia, meningitis.     Given slight wheezing on exam and hx, I did a CXR to eval for pneumonia. This shows no evidence of an acute infiltrate. Patient does have this examiner at home who is COVID-positive, presume she had the same.  PCR test ordered and is pending at this time.  Rapid strep and influenza testing is negative. There are no signs of complications of COVID-19 at this point such as septic shock, empyema, hypoxia, respiratory failure or compromise. Patient is tachycardic but has no other risk factors for PE; is low risk by wells criteria and given no chest pain/pleurisy I have a low suspicion for PE; defered D-dimer and ER evaluaiton at that time.     There are gastrointestinal symptoms at this point and no signs of dehydration.  Close followup with PCP is indicated; patient will discuss with on-call provider through E-visit tomorrow regarding Paxlovid or other antiviral treatment if indicated.  Go to ED for fever > 102 F, protracted vomiting, worsening shortness of breath, chest pain or other concerns.  Patient verbalized understanding and agreement with the plan was discharged in stable condition.    ADDENDUM:   Patient informed of lab error (COVID-19 PCR not received by lab); states that she tested positive at home for COVID this morning. Discussed risks/benefits of COVID-19 therapy (paxlovid)    Isometrics  FF  Ext  ER  IR  Abd  5 sec hold  X 5  X 5  X 5  X 5  X 5 5 sec hold  X 5  X 5  X 5  X 5  X 5             Ther Activity 12/19/2023 12/27/2023 12/29/2023                                             Modalities 12/19/2023 12/27/2023 12/29/2023     Ultrasound        MH        CP        E-STIM Shoulder MH at end of session 15' MH at end of session 10' MH at end of session 10'                 and following shared decision making she would like to start this today.  Prescription sent, she is on no regular scheduled medications and no contraindications, kidney function is normal.  All questions answered.      Tra Hawkins PA-C  [facility]    Subjective     Jazmine Powell is a 48 year old female who presents to clinic today for the following health issues:  Chief Complaint   Patient presents with    Suspected Covid     Has a family member tested positive for COVID also patient is concerned about walking pneumonia coughing a lot, wheezing and gets out of breath with history of Asthma.        HPI    URI Adult    Onset of symptoms was 2 day(s) ago.  Course of illness is waxing and waning.    Severity moderate  Current and Associated symptoms: fever, runny nose, stuffy nose, cough - non-productive, wheezing, shortness of breath and sore throat  Treatment measures tried include Tylenol and Albuterol.  Predisposing factors include ill contact: Family member (has COVID at home) and HX of asthma.    Patient denies any chest pain, leg swelling or calf pain, history of DVT/PE, abdominal pain, nausea vomiting or diarrhea..     Review of Systems    See HPI    Objective      Vitals: /57 (BP Location: Left arm, Patient Position: Sitting, Cuff Size: Adult Regular)   Pulse 102   Temp (!) 102.2  F (39  C) (Tympanic)   Resp 18   Wt 63 kg (139 lb)   SpO2 96%   BMI 27.37 kg/m      Patient Vitals for the past 24 hrs:   BP Temp Temp src Pulse Resp SpO2 Weight   02/25/23 1301 122/57 (!) 102.2  F (39  C) Tympanic 102 18 96 % 63 kg (139 lb)     Vital signs reviewed by: Tra Hawkins PA-C    Physical Exam   Constitutional: Patient is alert and cooperative. No acute distress.  Ears: Right TM is normal. Left TM is normal. External ear canals are normal.  Mouth: Mucous membranes are moist. Normal tongue and tonsil. Posterior oropharynx is clear.  Eyes: Conjunctivae, EOMI and lids are  normal..  Cardiovascular: Regular rate and rhythm  Pulmonary/Chest: Slight wheezes noted in the upper lung fields bilaterally.  No rales or rhonchi.  Effort normal. Speaking in full sentences; no respiratory distress.  No retractions or stridor.  GI: Abdomen is soft and non-tender throughout.  Neurological: Alert and oriented x3.   MSK: No lower extremity edema bilaterally.  Negative Homans' sign.  Skin: No rash noted on visualized skin.  Psychiatric:The patient has a normal mood and affect.       Labs/Imaging:          Tra Hawkins PA-C, February 25, 2023

## 2024-01-02 ENCOUNTER — OFFICE VISIT (OUTPATIENT)
Dept: OCCUPATIONAL THERAPY | Facility: CLINIC | Age: 54
End: 2024-01-02
Payer: COMMERCIAL

## 2024-01-02 DIAGNOSIS — S46.012D TRAUMATIC INCOMPLETE TEAR OF LEFT ROTATOR CUFF, SUBSEQUENT ENCOUNTER: Primary | ICD-10-CM

## 2024-01-02 PROCEDURE — G0283 ELEC STIM OTHER THAN WOUND: HCPCS

## 2024-01-02 PROCEDURE — 97014 ELECTRIC STIMULATION THERAPY: CPT

## 2024-01-02 NOTE — PROGRESS NOTES
"Daily Note     Today's date: 2024  Patient name: Christiano Monroe  : 1970  MRN: [id number]  Referring provider: Lawrence Gabriel MD  Dx:   Encounter Diagnosis     ICD-10-CM    1. Traumatic incomplete tear of left rotator cuff, subsequent encounter  S46.012D                      Subjective: \"That one makes it burn.\"      Objective: See treatment diary below      Assessment: Tolerated treatment well. Patient exhibited good technique with therapeutic exercises and would benefit from continued OT.  Patient reports increased activity over the weekend with increase in symptoms. Patient tolerate additional progressive exercises this date without issue. Patient reports improvement since start of care.       Plan: Continue per plan of care.  Progress treatment as tolerated.       Precautions: Left Shoulder Pain  Initial Evaluation completed on: 2023  Re-Evaluation needs to be completed before: 2024  Insurance: [employer]  FOTO: 2023  Auth Visits: N/A          Manuals 2023    PROM Shoulder Supine All Planes  All planes 30 sec x 3 - Discomfort in IR FF 5 Min  ABD 5 Min FF 5 Min  ABD 5 Min                            Neuro Re-Ed  2023                                                            Ther Ex 2023    Pendulums  CW  CCW  FF   HEP  HEP  HEP       Pulleys  FF  ABD  Seated  2 min  2 min Seated  2 min  2 min Seated  2 min  2 min    Finger Ladder  FF  Abd    30 x 4  30 x 4   30 x 4  30 x 4   30 x 4  30 x 4    Shoulder  Shrugs  Rolls  Retraction  Biceps Curl       HEP        3# x 20  3# x 20        3# x 20  3# x 20     Table Slides  FF  ABD   HEP Wall Slides  5 sec x 10 w/ball Wall Slides  5 sec x 10 w/ball Wall Slides  5 sec x 10 w/ball    Supine SPC  FF  CP  ER  ABD  Extension   HEP   X 15  X 15  5 sec x 10    Stance x 15   X 15  X 15  5 sec x 10    Stance x 15   X 15  X 15  5 " sec x 10    Stance x 15    Digi-Flex        UBE Machine   10 Min L 60 10 Min L 60    TB  Sh Ext  Triceps Ext  ADD  Retraction  IR  ER  Biceps Curl    Red  X 20      X 20  X 20    Doorway Stretch        Wall Slides  FF ABD        Isometrics  FF  Ext  ER  IR  Abd  5 sec hold  X 5  X 5  X 5  X 5  X 5 5 sec hold  X 5  X 5  X 5  X 5  X 5 5 sec hold  X 5  X 5  X 5  X 5  X 5            Ther Activity 12/19/2023 12/27/2023 12/29/2023 01/02/2024                                            Modalities 12/19/2023 12/27/2023 12/29/2023 01/02/2024    Ultrasound        MH        CP        E-STIM Shoulder MH at end of session 15' MH at end of session 10' MH at end of session 10' MH at end of session 10'

## 2024-01-03 ENCOUNTER — OFFICE VISIT (OUTPATIENT)
Dept: PULMONOLOGY | Facility: CLINIC | Age: 54
End: 2024-01-03
Payer: COMMERCIAL

## 2024-01-03 VITALS
HEIGHT: 68 IN | BODY MASS INDEX: 38.77 KG/M2 | DIASTOLIC BLOOD PRESSURE: 90 MMHG | TEMPERATURE: 97.5 F | OXYGEN SATURATION: 95 % | SYSTOLIC BLOOD PRESSURE: 132 MMHG | HEART RATE: 86 BPM | WEIGHT: 255.8 LBS

## 2024-01-03 DIAGNOSIS — J30.89 NON-SEASONAL ALLERGIC RHINITIS, UNSPECIFIED TRIGGER: ICD-10-CM

## 2024-01-03 DIAGNOSIS — E66.09 CLASS 2 OBESITY DUE TO EXCESS CALORIES WITHOUT SERIOUS COMORBIDITY WITH BODY MASS INDEX (BMI) OF 38.0 TO 38.9 IN ADULT: ICD-10-CM

## 2024-01-03 DIAGNOSIS — F17.210 CIGARETTE NICOTINE DEPENDENCE WITHOUT COMPLICATION: ICD-10-CM

## 2024-01-03 DIAGNOSIS — G47.33 OSA (OBSTRUCTIVE SLEEP APNEA): Primary | ICD-10-CM

## 2024-01-03 PROCEDURE — 99214 OFFICE O/P EST MOD 30 MIN: CPT | Performed by: INTERNAL MEDICINE

## 2024-01-03 NOTE — PROGRESS NOTES
"Office Progress Note - Pulmonary    Christiano Monroe 53 y.o. male MRN: [id number]    Encounter: [id number]      Assessment:  Obstructive sleep apnea.  Ongoing tobacco use.  Obesity.    Plan:   Nocturnal CPAP.  Smoking cessation.  Weight loss.  Referral to weight management program.  Hold the Anoro and monitor the symptoms.  Follow-up in 1 year.    Discussion:   The patient's obstructive sleep apnea is well treated.  He is compliant with the nocturnal CPAP.  We had a long discussion regarding his weight.  The patient has struggled to lose weight in the past.  I have suggested referral to weight management program and he agreed.  I have made the referral.  Also we talked about his smoking habit.  The patient is not motivated to quit smoking.  He said that at least not at this time.  His PFTs showed no evidence of airflow limitation.  I told him to hold the Anoro and monitor his symptoms.  If he does not notice any change in his symptoms then he can stop the Anoro.  I will see him in 1 year and a follow-up visit.  I have offered him the influenza vaccine today however he declined.      Subjective:   The patient is here for a follow-up visit.  He has occasional cough.  He is still smoking at least half a pack a day.  He is using the Anoro once a day.  He is on the CPAP every night except for the time when he had a cold.  Denies daytime hypersomnolence.    Review of systems:  A 12 point system review is done and aside from what is stated above the rest of the review of systems is negative.      Family history and social history are reviewed.    Medications list is reviewed.      Vitals: Blood pressure 132/90, pulse 86, temperature 97.5 °F (36.4 °C), temperature source Tympanic, height 5' 8\" (1.727 m), weight 116 kg (255 lb 12.8 oz), SpO2 95%.,     Physical Exam  Gen: Awake, alert, oriented x 3, no acute distress  HEENT: Mucous membranes moist, no oral lesions, no thrush  NECK: No accessory muscle use, JVP not " elevated  Cardiac: Regular, single S1, single S2, no murmurs, no rubs, no gallops  Lungs: Clear breath sounds.  No wheezing or rhonchi.  Abdomen: normoactive bowel sounds, soft nontender, nondistended, no rebound or rigidity, no guarding  Extremities: no cyanosis, no clubbing, no edema  Neuro:  Grossly nonfocal.  Skin:  No rash.

## 2024-01-05 ENCOUNTER — OFFICE VISIT (OUTPATIENT)
Dept: OCCUPATIONAL THERAPY | Facility: CLINIC | Age: 54
End: 2024-01-05
Payer: COMMERCIAL

## 2024-01-05 DIAGNOSIS — S46.012D TRAUMATIC INCOMPLETE TEAR OF LEFT ROTATOR CUFF, SUBSEQUENT ENCOUNTER: Primary | ICD-10-CM

## 2024-01-05 PROCEDURE — G0283 ELEC STIM OTHER THAN WOUND: HCPCS

## 2024-01-05 PROCEDURE — 97014 ELECTRIC STIMULATION THERAPY: CPT

## 2024-01-05 PROCEDURE — 97110 THERAPEUTIC EXERCISES: CPT

## 2024-01-05 PROCEDURE — 97140 MANUAL THERAPY 1/> REGIONS: CPT

## 2024-01-05 NOTE — PROGRESS NOTES
"Daily Note     Today's date: 2024  Patient name: Christiano Monroe  : 1970  MRN: [id number]  Referring provider: Lawrence Gabriel MD  Dx:   Encounter Diagnosis     ICD-10-CM    1. Traumatic incomplete tear of left rotator cuff, subsequent encounter  S46.012D                      Subjective: \"Just waiting for the snow.\"      Objective: See treatment diary below      Assessment: Tolerated treatment well. Patient exhibited good technique with therapeutic exercises and would benefit from continued OT. Patient reports some discomfort when completing PROM in shoulder abduction this date. Therapist adjusted and completed in scaption with decrease noted. Patient reports mild \"burning\" during progressive exercises but no pain. Patient tolerated all progressive exercises without issue this date.       Plan: Continue per plan of care.  Progress treatment as tolerated.       Precautions: Left Shoulder Pain  Initial Evaluation completed on: 2023  Re-Evaluation needs to be completed before: 2024  Insurance: [employer]  FOTO: 2023  Auth Visits: N/A          Manuals 2023   PROM Shoulder Supine All Planes  All planes 30 sec x 3 - Discomfort in IR FF 5 Min  ABD 5 Min FF 5 Min  ABD 5 Min FF 5 Min  ABD 5 Min  IR 5 Min                           Neuro Re-Ed  2023                                                           Ther Ex 2023   Pendulums  CW  CCW  FF   HEP  HEP  HEP       Pulleys  FF  ABD  Seated  2 min  2 min Seated  2 min  2 min Seated  2 min  2 min Seated  2 min  2 min   Finger Ladder  FF  Abd    30 x 4  30 x 4   30 x 4  30 x 4   30 x 4  30 x 4   30 x 4  30 x 4   Shoulder  Shrugs  Rolls  Retraction  Biceps Curl       HEP        3# x 20  3# x 20        3# x 20  3# x 20        3# x 20  3# x 20    Table Slides  FF  ABD   HEP Wall Slides  5 sec x 10 " w/ball Wall Slides  5 sec x 10 w/ball Wall Slides  5 sec x 10 w/ball Wall Slides  5 sec x 10 w/ball   Supine SPC  FF  CP  ER  ABD  Extension   HEP   X 15  X 15  5 sec x 10    Stance x 15   X 15  X 15  5 sec x 10    Stance x 15   X 15  X 15  5 sec x 10    Stance x 15   X 15  X 15  5 sec x 10    Stance x 15   Digi-Flex        UBE Machine   10 Min L 60 10 Min L 60 10 Min L 60   TB  Sh Ext  Triceps Ext  ADD  Retraction  IR  ER  Biceps Curl    Red  X 20      X 20  X 20 Red  X 20      X 20  X 20   Doorway Stretch        Wall Slides  FF ABD        Isometrics  FF  Ext  ER  IR  Abd  5 sec hold  X 5  X 5  X 5  X 5  X 5 5 sec hold  X 5  X 5  X 5  X 5  X 5 5 sec hold  X 5  X 5  X 5  X 5  X 5 5 sec hold  X 5  X 5  X 5  X 5  X 5           Ther Activity 12/19/2023 12/27/2023 12/29/2023 01/02/2024 01/05/2024                                           Modalities 12/19/2023 12/27/2023 12/29/2023 01/02/2024 01/05/2024   Ultrasound        MH        CP        E-STIM Shoulder MH at end of session 15' MH at end of session 10' MH at end of session 10' MH at end of session 10' MH at end of session 10'

## 2024-01-08 ENCOUNTER — OFFICE VISIT (OUTPATIENT)
Dept: OCCUPATIONAL THERAPY | Facility: CLINIC | Age: 54
End: 2024-01-08
Payer: COMMERCIAL

## 2024-01-08 DIAGNOSIS — S46.012D TRAUMATIC INCOMPLETE TEAR OF LEFT ROTATOR CUFF, SUBSEQUENT ENCOUNTER: Primary | ICD-10-CM

## 2024-01-08 PROCEDURE — 97110 THERAPEUTIC EXERCISES: CPT

## 2024-01-08 NOTE — PROGRESS NOTES
Daily Note     Today's date: 2024  Patient name: Christiano Monroe  : 1970  MRN: [id number]  Referring provider: Lawrence Gabriel MD  Dx:   Encounter Diagnosis     ICD-10-CM    1. Traumatic incomplete tear of left rotator cuff, subsequent encounter  S46.012D                      Subjective: I got that stim unit, [facility], I love it.      Objective: See treatment diary below      Assessment: Tolerated treatment well. Patient exhibited good technique with therapeutic exercises and would benefit from continued OT. Pt participated in skilled OT this date with focus to increased ROM, min UE strength/endurance, GMC/GMS, and activity tolerance/endurance, for increased safety/engagement in ADL/IADL tasks.       Plan: Continue per plan of care.  Progress treatment as tolerated.       Precautions: Left Shoulder Pain  Initial Evaluation completed on: 2023  Re-Evaluation needs to be completed before: 2024  Insurance: [employer]  FOTO: 2023  Auth Visits: N/A       Manuals 2023   PROM Shoulder Supine All Planes All planes 30 sec x 3  FF 5 Min  ABD 5 Min FF 5 Min  ABD 5 Min FF 5 Min  ABD 5 Min  IR 5 Min                           Neuro Re-Ed  2023                                                           Ther Ex 2023   Pendulums  CW  CCW  FF        Pulleys  FF  ABD   Seated  2 min  2 min Seated  2 min  2 min Seated  2 min  2 min   Finger Ladder  FF  Abd   30 x 4  30 x 4    30 x 4  30 x 4   30 x 4  30 x 4   30 x 4  30 x 4   Shoulder  Shrugs  Rolls  Retraction  Biceps Curl         3# x 20  3# x 20        3# x 20  3# x 20        3# x 20  3# x 20    Table Slides  FF  ABD Wall Slides  5 sec x 20 w/yellow ball  Wall Slides  5 sec x 10 w/ball Wall Slides  5 sec x 10 w/ball Wall Slides  5 sec x 10 w/ball   Supine SPC  FF  CP  ER  ABD  Extension 1.5# dowel Stance x 20        Stance x 20    X 15  X  15  5 sec x 10    Stance x 15   X 15  X 15  5 sec x 10    Stance x 15   X 15  X 15  5 sec x 10    Stance x 15   Digi-Flex        UBE Machine 5F/5B min L 50  10 Min L 60 10 Min L 60 10 Min L 60   TB  Sh Ext  Triceps Ext  ADD  Retraction  IR  ER  Biceps Curl Red  X 20  X 20    X 20  X 20  X 20  X 20   Red  X 20      X 20  X 20 Red  X 20      X 20  X 20   Doorway Stretch Single Arm Doorway 15 sec x 3       Wall Slides  FF ABD        Isometrics  FF  Ext  ER  IR  Abd   5 sec hold  X 5  X 5  X 5  X 5  X 5 5 sec hold  X 5  X 5  X 5  X 5  X 5 5 sec hold  X 5  X 5  X 5  X 5  X 5           Ther Activity 1/8/2024 12/29/2023 01/02/2024 01/05/2024                                           Modalities 1/8/2024 12/29/2023 01/02/2024 01/05/2024   Ultrasound        MH        CP        E-STIM Shoulder W/ MH at post of session 10'  MH at end of session 10' MH at end of session 10' MH at end of session 10'

## 2024-01-11 ENCOUNTER — OFFICE VISIT (OUTPATIENT)
Dept: OCCUPATIONAL THERAPY | Facility: CLINIC | Age: 54
End: 2024-01-11
Payer: COMMERCIAL

## 2024-01-11 DIAGNOSIS — S46.012D TRAUMATIC INCOMPLETE TEAR OF LEFT ROTATOR CUFF, SUBSEQUENT ENCOUNTER: Primary | ICD-10-CM

## 2024-01-11 PROCEDURE — 97110 THERAPEUTIC EXERCISES: CPT

## 2024-01-11 PROCEDURE — 97014 ELECTRIC STIMULATION THERAPY: CPT

## 2024-01-11 PROCEDURE — 97140 MANUAL THERAPY 1/> REGIONS: CPT

## 2024-01-11 PROCEDURE — G0283 ELEC STIM OTHER THAN WOUND: HCPCS

## 2024-01-11 NOTE — PROGRESS NOTES
"Daily Note     Today's date: 2024  Patient name: Christiano Monroe  : 1970  MRN: [id number]  Referring provider: Lawrence Gabriel MD  Dx:   Encounter Diagnosis     ICD-10-CM    1. Traumatic incomplete tear of left rotator cuff, subsequent encounter  S46.012D                      Subjective: \"I think I slept on it wrong.\"      Objective: See treatment diary below      Assessment: Tolerated treatment well. Patient exhibited good technique with therapeutic exercises and would benefit from continued OT. Patient reports increased soreness this AM due to sleeping position. Patient reports increased discomfort during supine SPC exercises. Patient completed stretching after to address symptoms and E-STIM at end of session.       Plan: Continue per plan of care.  Progress treatment as tolerated.       Precautions: Left Shoulder Pain  Initial Evaluation completed on: 2023  Re-Evaluation needs to be completed before: 2024  Insurance: [employer]  FOTO: 2023  Auth Visits: N/A       Manuals 2024      PROM Shoulder Supine All Planes All planes 30 sec x 3 All planes 30 sec x 3                              Neuro Re-Ed  2024                                                              Ther Ex 2024      Pendulums  CW  CCW  FF        Pulleys  FF  ABD  Seated  2 min  2 min      Finger Ladder  FF  Abd   30 x 4  30 x 4   30 x 4  30 x 4      Shoulder  Shrugs  Rolls  Retraction  Biceps Curl        Table Slides  FF  ABD Wall Slides  5 sec x 20 w/yellow ball Wall Slides  5 sec x 20 w/yellow ball      Supine SPC  FF  CP  ER  ABD  Extension 1.5# dowel Stance x 20        Stance x 20 Supine  X 20      Digi-Flex        UBE Machine 5F/5B min L 50 5F/5B min L 50      TB  Sh Ext  Triceps Ext  ADD  Retraction  IR  ER  Biceps Curl Red  X 20  X 20    X 20  X 20  X 20  X 20 Red  X 20  X 20    X 20  X 20  X 20  X 20      Doorway Stretch Single Arm Doorway 15 sec x 3 Single Arm " Doorway 20 sec x 3  ER 20 sec x 3      Wall Slides  FF ABD        Isometrics  FF  Ext  ER  IR  Abd                Ther Activity 1/8/2024 01/11/2024                                              Modalities 1/8/2024 01/11/2024      Ultrasound                CP        E-STIM Shoulder W/ MH at post of session 10' W/ MH at post of session 10'                        [Nasal Congestion] : nasal congestion [Dry Skin] : dry skin [Itching] : itching [Negative] : Constitutional [Irritable] : no irritability [Inconsolable] : consolable [Fussy] : not fussy [Crying] : no crying [Malaise] : no malaise [Eye Discharge] : no eye discharge [Eye Redness] : no eye redness [Dysconjugate gaze] : no dysconjugate gaze [Increased Lacrimation] : no increased lacrimation [Ear Tugging] : no ear tugging [Nasal Discharge] : no nasal discharge [Snoring] : no snoring [Mouth Breathing] : no mouth breathing [Dental Caries] : no dental caries [Cyanosis] : no cyanosis [Diaphoresis] : not diaphoretic [Edema] : no edema [Tachypnea] : not tachypneic [Wheezing] : no wheezing [Cough] : no cough [Intolerance to feeds] : tolerance to feeds [Spitting Up] : no spitting up [Constipation] : no constipation [Vomiting] : no vomiting [Diarrhea] : no diarrhea [Gaseous] : not gaseous [Hematuria] : no hematuria

## 2024-01-15 ENCOUNTER — OFFICE VISIT (OUTPATIENT)
Dept: OCCUPATIONAL THERAPY | Facility: CLINIC | Age: 54
End: 2024-01-15
Payer: COMMERCIAL

## 2024-01-15 DIAGNOSIS — S46.012D TRAUMATIC INCOMPLETE TEAR OF LEFT ROTATOR CUFF, SUBSEQUENT ENCOUNTER: Primary | ICD-10-CM

## 2024-01-15 PROCEDURE — 97110 THERAPEUTIC EXERCISES: CPT

## 2024-01-15 NOTE — PROGRESS NOTES
Daily Note     Today's date: 1/15/2024  Patient name: Christiano Monroe  : 1970  MRN: [id number]  Referring provider: Lawrence Gabriel MD  Dx:   Encounter Diagnosis     ICD-10-CM    1. Traumatic incomplete tear of left rotator cuff, subsequent encounter  S46.012D                      Subjective: No I feel pretty good today.      Objective: See treatment diary below      Assessment: Tolerated treatment well. Patient exhibited good technique with therapeutic exercises and would benefit from continued OT. Pt presents to session this date with pain 3/10 to start. Pt tolerated increase in green theraband this date from red to green with continued reps x 20 each carried over from previous session. Pt with fatigue note by end and minimal burning, however, pt stating it was good. Increased difficulty with adduction exercise that was added this date with decreased reps completed due to discomfort. Pt finishing session with pain back down to where it started at beginning of session of 3/10, with pt stating increased relief from E-stim and MH.    Plan: Continue per plan of care.  Progress treatment as tolerated.       Precautions: Left Shoulder Pain  Initial Evaluation completed on: 2023  Re-Evaluation needs to be completed before: 2024  Insurance: [employer]  FOTO: 2023  Auth Visits: N/A       Manuals 2024 2024 1/15/2024     PROM Shoulder Supine All Planes All planes 30 sec x 3 All planes 30 sec x 3 All planes 30 sec x 3                             Neuro Re-Ed  2024 2024 1/15/2024                                                             Ther Ex 2024 2024 1/15/2024     Pendulums  CW  CCW  FF        Pulleys  FF  ABD  Seated  2 min  2 min Seated  2 min  2 min     Finger Ladder  FF  Abd   30 x 4  30 x 4   30 x 4  30 x 4      Shoulder  Shrugs  Rolls  Retraction  Biceps Curl        Table Slides  FF  ABD Wall Slides  5 sec x 20 w/yellow ball Wall Slides  5 sec x 20  w/yellow ball      Supine SPC  FF  CP  ER  ABD  Extension 1.5# dowel Stance x 20        Stance x 20 Supine  X 20 Supine  X 20        10 sec x 10       Digi-Flex        UBE Machine 5F/5B min L 50 5F/5B min L 50 5F/5B min L 2.5     TB  Sh Ext  Triceps Ext  ADD  Retraction  IR  ER  Biceps Curl  Adduction Red  X 20  X 20    X 20  X 20  X 20  X 20 Red  X 20  X 20    X 20  X 20  X 20  X 20 Green  X 20  X 20  X 20  X 20  X 20  X 20  X 20  X 10     Doorway Stretch Single Arm Doorway 15 sec x 3 Single Arm Doorway 20 sec x 3  ER 20 sec x 3 Cross body delt stretch     Sleeper Stretch   Side Lying  20 sec x 3     Isometrics  FF  Ext  ER  IR  Abd                Ther Activity 1/8/2024 01/11/2024 1/15/2024                                             Modalities 1/8/2024 01/11/2024 1/15/2024     Ultrasound        MH        CP        E-STIM Shoulder W/ MH at post of session 10' W/ MH at post of session 10' W/ MH post session 10'

## 2024-01-18 ENCOUNTER — EVALUATION (OUTPATIENT)
Dept: OCCUPATIONAL THERAPY | Facility: CLINIC | Age: 54
End: 2024-01-18
Payer: COMMERCIAL

## 2024-01-18 DIAGNOSIS — S46.012D TRAUMATIC INCOMPLETE TEAR OF LEFT ROTATOR CUFF, SUBSEQUENT ENCOUNTER: Primary | ICD-10-CM

## 2024-01-18 PROCEDURE — 97168 OT RE-EVAL EST PLAN CARE: CPT

## 2024-01-18 PROCEDURE — 97110 THERAPEUTIC EXERCISES: CPT

## 2024-01-18 NOTE — PROGRESS NOTES
OT Re-Evaluation     Today's date: 2024  Patient name: Christiano Monroe  : 1970  MRN: [id number]  Referring provider: Lawrence Gabriel MD  Dx:   Encounter Diagnosis     ICD-10-CM    1. Traumatic incomplete tear of left rotator cuff, subsequent encounter  S46.012D                      Assessment  Assessment details: Patient presenting with OP OT services with a dx of left supraspinatus tear shoulder pain. Patient reports injury began on 2023. Patient states injury occurred when he fell. Patient initially went to seek medical care on 2023. Patient's last appointment with Ortho was on 2023. Patient had MRI completed on 2023. Patient returned to Ortho on 2023. Patient next follow-up with Ortho is on 2023. Patient is expected to be off work for the next six weeks.  Patient is a  for a living. Patient is right hand dominant. Patient reports symptoms include pain with movement and difficulty moving left shoulder. Patient was provided a prescription for antiinflammatory.     As per recent Ortho appointment:  MRI which do show some tendinopathy and a partial-thickness tear of the supraspinatus which is likely contributing to his pain, weakness, and decreased range of motion since the injury.  I discussed both conservative and surgical management of his injury focusing on conservative management at this point.  I did discuss that partial thickness rotator cuff tears usually respond well to conservative management and that I did not expect that he would need surgical intervention for this.  I discussed with the patient that at this point he can start working on physical therapy to minimize any stiffness of the shoulder.  I discussed with the patient that he should continue use of meloxicam to help with pain control.  Discontinue the use of the sling at this time.  I did provide him with a work note stating that he will be out of work for 6 weeks until repeat  evaluation.  Patient demonstrated understanding of our discussion was agreed with the plan.  All his questions were answered.  I did him with a prescription for physical therapy and meloxicam as well as a work note.  I will see the patient back in 6 to 8 weeks for repeat evaluation.  I did discuss that if the symptoms persist we can also try a corticosteroid injection as well.  He can reach out to clinic with any question concerns anytime.    Re-assessment completed on 01/18/2024. Patient has consistently attended OP OT services since start of care. Patient has made steady progress towards established goals. Patient demonstrating with increases in shoulder ROM in all planes, shoulder strength,  strength, functional use and decreases in pain. Patient has not met all established goals and will benefit from continued OT services to maximize level of function. Patient has a follow-up with Ortho on 01/24/2024. Patient and therapist discussed continuation of OT services 2 a week for an additional 4 weeks.     Impairments: abnormal coordination, abnormal or restricted ROM and impaired physical strength    Symptom irritability: moderateUnderstanding of Dx/Px/POC: good   Prognosis: good    Goals  STGs    Pt will increase  strength by 5-10#. - Met    Pt will increase shoulder strength by 1/2 grade. - Met    Pt will increase shoulder ROM by 50%. - Met    Pt will report decrease in morning stiffness by 25%. - Met    Independent with HEP. - Met    LTGs     Pt will increase  strength by an additional 5-10#. - Met    Pt will increase shoulder strength by 1-2 grade. - Progressing    Pt will increase shoulder ROM to WFL. - Progressing    Pt will report an increase in ADL/IADL participation. - Progressing    Pt will report no more than a 0/10 pain with activity - Progressing          Plan  Plan details: Continue with POC    Patient would benefit from: OT eval and skilled occupational therapy  Referral necessary:  Yes  Planned modality interventions: ultrasound, unattended electrical stimulation, thermotherapy: hydrocollator packs, cryotherapy and thermotherapy: paraffin bath  Planned therapy interventions: massage, manual therapy, joint mobilization, patient education, strengthening, stretching, fine motor coordination training, home exercise program, neuromuscular re-education, self care, therapeutic exercise and therapeutic activities  Frequency: 2x week  Duration in visits: 8  Duration in weeks: 4  Treatment plan discussed with: patient        Subjective Evaluation    History of Present Illness  Date of onset: 2023  Mechanism of injury: trauma  Mechanism of injury: Left Rotator Cuff Tear s/p fall          Not a recurrent problem   Quality of life: good    Patient Goals  Patient goals for therapy: increased strength, increased motion, independence with ADLs/IADLs, return to work and decreased pain    Pain  Current pain rating: 3  At best pain rating: 3  At worst pain ratin  Location: Left Shoulder  Relieving factors: ice and medications    Social Support    Employment status: working  Hand dominance: right      Diagnostic Tests  MRI studies: abnormal  Treatments  Current treatment: occupational therapy        Objective     Neurological Testing     Sensation     Shoulder   Left Shoulder   Intact: light touch    Right Shoulder   Intact: Light touch    Active Range of Motion   Left Shoulder   Flexion: 155 degrees with pain  Extension: 55 degrees   Abduction: 65 degrees with pain  Adduction: WFL  External rotation 0°: 55 degrees   Internal rotation 0°: WFL    Right Shoulder   Normal active range of motion    Left Elbow   Normal active range of motion    Right Elbow   Normal active range of motion    Left Wrist   Normal active range of motion    Right Wrist   Normal active range of motion    Left Thumb     Opposition: WNL    Right Thumb   Opposition: WNL    Additional Active Range of Motion Details  Patient  demonstrating with a decrease in left shoulder ROM compared to unaffected side.    Patient presenting with a 3 inch difference in IR BHB as compared to right.     IR BHB increased to even compared to unaffected side  Increase in ROM compared to SOC    Strength/Myotome Testing     Left Shoulder     Planes of Motion   Flexion: 3   Extension: 3   Abduction: 2-   Adduction: 3   External rotation at 0°: 3   Internal rotation at 0°: 3     Right Shoulder   Normal muscle strength    Left Elbow   Flexion: 4+  Extension: 4+  Forearm supination: 4+  Forearm pronation: 4+    Right Elbow   Normal strength    Left Wrist/Hand   Wrist extension: 4  Wrist flexion: 4  Radial deviation: 4  Ulnar deviation: 4     (2nd hand position)     Trial 1: 85    Right Wrist/Hand   Normal wrist strength     (2nd hand position)     Trial 1: 80    Additional Strength Details  Increased strength compared to SOC    Patient demonstrating with a decrease in wrist,  strength compared to unaffected upper extremity.                   Precautions: Left Shoulder Pain  Initial Evaluation completed on: 12/19/2023  Re-Evaluation needs to be completed before: 02/18/2024  Insurance: [employer]  FOTO: 12/19/2023  Auth Visits: N/A       Manuals 1/8/2024 01/11/2024 1/15/2024 01/18/2024    PROM Shoulder Supine All Planes All planes 30 sec x 3 All planes 30 sec x 3 All planes 30 sec x 3                             Neuro Re-Ed  1/8/2024 01/11/2024 1/15/2024 01/18/2024                                                            Ther Ex 1/8/2024 01/11/2024 1/15/2024 01/18/2024    Body Blade    Elbow Bent  1 Min x 3    Pulleys  FF  ABD  Seated  2 min  2 min Seated  2 min  2 min     Finger Ladder  FF  Abd   30 x 4  30 x 4   30 x 4  30 x 4      Shoulder  Shrugs  Rolls  Retraction  Biceps Curl        Table Slides  FF  ABD Wall Slides  5 sec x 20 w/yellow ball Wall Slides  5 sec x 20 w/yellow ball      Supine SPC  FF  CP  ER  ABD  Extension 1.5# dowel Stance x  20        Stance x 20 Supine  X 20 Supine  X 20        10 sec x 10   Supine        X 10    Digi-Flex        UBE Machine 5F/5B min L 50 5F/5B min L 50 5F/5B min L 2.5 5F/5B min L 2.5    TB  Sh Ext  Triceps Ext  ADD  Retraction  IR  ER  Biceps Curl  Adduction Red  X 20  X 20    X 20  X 20  X 20  X 20 Red  X 20  X 20    X 20  X 20  X 20  X 20 Green  X 20  X 20  X 20  X 20  X 20  X 20  X 20  X 10 Green  X 20  X 20  X 20  X 20  X 20  X 20  X 20  X 10    Doorway Stretch Single Arm Doorway 15 sec x 3 Single Arm Doorway 20 sec x 3  ER 20 sec x 3 Cross body delt stretch Cross body delt stretch    Sleeper Stretch   Side Lying  20 sec x 3 Side Lying  20 sec x 3    Isometrics  FF  Ext  ER  IR  Abd                Ther Activity 1/8/2024 01/11/2024 1/15/2024 01/18/2024                                            Modalities 1/8/2024 01/11/2024 1/15/2024 01/18/2024    Ultrasound        MH        CP        E-STIM Shoulder W/ MH at post of session 10' W/ MH at post of session 10' W/ MH post session 10' W/ MH post session 15'

## 2024-01-19 ENCOUNTER — TELEPHONE (OUTPATIENT)
Dept: FAMILY MEDICINE CLINIC | Facility: CLINIC | Age: 54
End: 2024-01-19

## 2024-01-22 ENCOUNTER — OFFICE VISIT (OUTPATIENT)
Dept: OCCUPATIONAL THERAPY | Facility: CLINIC | Age: 54
End: 2024-01-22
Payer: COMMERCIAL

## 2024-01-22 DIAGNOSIS — S46.012D TRAUMATIC INCOMPLETE TEAR OF LEFT ROTATOR CUFF, SUBSEQUENT ENCOUNTER: Primary | ICD-10-CM

## 2024-01-22 PROCEDURE — G0283 ELEC STIM OTHER THAN WOUND: HCPCS

## 2024-01-22 PROCEDURE — 97014 ELECTRIC STIMULATION THERAPY: CPT

## 2024-01-22 PROCEDURE — 97110 THERAPEUTIC EXERCISES: CPT

## 2024-01-24 ENCOUNTER — OFFICE VISIT (OUTPATIENT)
Dept: OCCUPATIONAL THERAPY | Facility: CLINIC | Age: 54
End: 2024-01-24
Payer: COMMERCIAL

## 2024-01-24 ENCOUNTER — OFFICE VISIT (OUTPATIENT)
Dept: OBGYN CLINIC | Facility: CLINIC | Age: 54
End: 2024-01-24
Payer: COMMERCIAL

## 2024-01-24 VITALS
WEIGHT: 250 LBS | SYSTOLIC BLOOD PRESSURE: 121 MMHG | DIASTOLIC BLOOD PRESSURE: 85 MMHG | HEART RATE: 80 BPM | HEIGHT: 68 IN | BODY MASS INDEX: 37.89 KG/M2

## 2024-01-24 DIAGNOSIS — S46.012D TRAUMATIC INCOMPLETE TEAR OF LEFT ROTATOR CUFF, SUBSEQUENT ENCOUNTER: Primary | ICD-10-CM

## 2024-01-24 DIAGNOSIS — S46.012A TRAUMATIC INCOMPLETE TEAR OF LEFT ROTATOR CUFF, INITIAL ENCOUNTER: Primary | ICD-10-CM

## 2024-01-24 PROCEDURE — 99213 OFFICE O/P EST LOW 20 MIN: CPT | Performed by: STUDENT IN AN ORGANIZED HEALTH CARE EDUCATION/TRAINING PROGRAM

## 2024-01-24 PROCEDURE — 97110 THERAPEUTIC EXERCISES: CPT

## 2024-01-24 NOTE — LETTER
January 24, 2024     Patient: Christiano Monroe  YOB: 1970  Date of Visit: 1/24/2024      To Whom it May Concern:    Christiano Monroe is under my professional care. Christiano Espino was seen in my office on 1/24/2024. Christiano Espino may not return to work until seen in reevaluation in 6 weeks at which time his restrictions will be reassessed.    If you have any questions or concerns, please don't hesitate to call.         Sincerely,          Lawrence Gabriel MD        CC: No Recipients

## 2024-01-24 NOTE — PROGRESS NOTES
Ortho Sports Medicine Shoulder New Patient Visit     Assesment:   53 y.o. male with left shoulder pain, weakness and limited ROM after a fall 8 weeks ago (11/28/24) with MRI showing a partial thickness supraspinatus tear and a grade 2 strain of the anterolateral deltoid muscle.    Plan:  I reviewed the history and exam with the patient in clinic today.  The patient has been going to physical therapy and states that he feels that overall his symptoms have improved.  He is still having trouble with abduction of the shoulder which could be due to both the supraspinatus tear as well as the deltoid muscle strain.  I discussed that we will likely take longer for that to improve.  I recommended continuing physical therapy.  He can take Tylenol or anti-inflammatories as needed.  I recommended follow-up in 6 weeks for repeat evaluation.  I did discuss that we have the option of doing a corticosteroid injection if his symptoms do not improve.  Patient demonstrate understanding our discussion was agreed with the plan.  All his questions were answered.  I will see him back in 6 weeks for repeat evaluation.  He can reach out to clinic with any questions or concerns anytime.    Conservative treatment:  Ice to shoulder 1-2 times daily, for 20 minutes at a time.  Continue PT.  A note was provided for work. Patient will remain out of work at this time.    Imaging:  All imaging from today was reviewed by myself and explained to the patient.     Injection:  No Injection planned at this time. May consider if the future pending clinical course.    Surgery:  No surgery is recommended at this point, continue with conservative treatment plan as noted.    Follow up:  Return in about 6 weeks (around 3/6/2024) for Recheck.      Chief Complaint   Patient presents with    Left Shoulder - Follow-up, Pain         History of Present Illness:  The patient is a 53 y.o. RHD male seen in clinic for a 6 week follow up of the left shoulder for pain in  the setting of a partial thickness supraspinatus tear following an injury on 11/28/23 after he experienced a fall onto his left arm. At that patient's last appointment, following review of an MRI, the patient was agreeable to beginning PT and continuing with meloxicam.  Since his last appointment, he states he has been going to PT and taking the meloxciam.  He states he is doing better.  Pain is about a 3/10.  He states he does not need meloxicam any longer. Patient states he is using a TENS unit when pain is intensified. He denies numbness or tingling.     The patient has the following co-morbidities: n/a    Shoulder Surgical History:  None    Past Medical, Social and Family History:  Past Medical History:   Diagnosis Date    Chest pain      History reviewed. No pertinent surgical history.  Allergies   Allergen Reactions    Pollen Extract Itching     Current Outpatient Medications on File Prior to Visit   Medication Sig Dispense Refill    albuterol (ProAir HFA) 90 mcg/act inhaler Inhale 2 puffs every 6 (six) hours as needed for wheezing or shortness of breath 8.5 g 3    fluticasone (FLONASE) 50 mcg/act nasal spray 2 sprays into each nostril daily 16 g 3    meloxicam (Mobic) 15 mg tablet Take 1 tablet (15 mg total) by mouth daily 30 tablet 0    tamsulosin (FLOMAX) 0.4 mg Take 1 capsule (0.4 mg total) by mouth daily with dinner 30 capsule 11    lisinopril (ZESTRIL) 5 mg tablet       methylPREDNISolone 4 MG tablet therapy pack Use as directed on package (Patient not taking: Reported on 9/6/2023) 21 tablet 0    olopatadine (PATANOL) 0.1 % ophthalmic solution Administer 1 drop to both eyes 2 (two) times a day (Patient not taking: Reported on 1/3/2024) 5 mL 3    sildenafil (VIAGRA) 100 mg tablet 1 TABLET 1 HOUR PRIOR TO SEX ON EMPTY STOMACH(2HRS AFTER MEAL) DO NOT TAKE W/I 4HRS OF TAMSULOSIN (Patient not taking: Reported on 1/3/2024) 10 tablet 0    umeclidinium-vilanterol (Anoro Ellipta) 62.5-25 mcg/actuation inhaler  "Inhale 1 puff daily 60 blister 3     No current facility-administered medications on file prior to visit.     Social History     Socioeconomic History    Marital status: /Civil Union     Spouse name: Not on file    Number of children: Not on file    Years of education: Not on file    Highest education level: Not on file   Occupational History    Not on file   Tobacco Use    Smoking status: Every Day     Current packs/day: 1.00     Average packs/day: 1 pack/day for 39.1 years (39.1 ttl pk-yrs)     Types: Cigarettes     Start date: 1985    Smokeless tobacco: Never    Tobacco comments:     10 to 12 cigarettes a day he did  quit from [id number]   Vaping Use    Vaping status: Never Used   Substance and Sexual Activity    Alcohol use: Yes     Comment: occasionally    Drug use: No    Sexual activity: Yes   Other Topics Concern    Not on file   Social History Narrative    Not on file     Social Determinants of Health     Financial Resource Strain: Not on file   Food Insecurity: Not on file   Transportation Needs: Not on file   Physical Activity: Not on file   Stress: Not on file   Social Connections: Not on file   Intimate Partner Violence: Not on file   Housing Stability: Not on file       I have reviewed the past medical, surgical, social and family history, medications and allergies as documented in the EMR.    Review of systems: ROS is negative other than that noted in the HPI.  Constitutional: Negative for fatigue and fever.      Physical Exam:    Blood pressure 121/85, pulse 80, height 5' 8\" (1.727 m), weight 113 kg (250 lb).    General/Constitutional: NAD, well developed, well nourished  HENT: Normocephalic, atraumatic  CV: Intact distal pulses, regular rate  Resp: No respiratory distress or labored breathing  GI: Soft and non-tender   Lymphatic: No lymphadenopathy palpated  Neuro: Alert and Oriented x 3, no focal deficits  Psych: Normal mood, normal affect, normal judgement, normal behavior  Skin: Warm, dry, " no rashes, no erythema      Focused Left shoulder exam:  No paracervical tenderness.   No cervical tenderness.   No pain with neck flexion, extension, side-to-side bending, or rotation.     The skin is intact without evidence of erythema or ecchymosis. Symmetric shoulders with no evidence of supraspinatus or infraspinatus muscle atrophy. There is no evidence neurologic medial or lateral scapular winging. Normal scapular positioning.    Palpation demonstrates no tenderness over the AC joint, lateral border of the acromion, SC joint, bilateral clavicle, or bicipital groove.    Shoulder ROM demonstrates 170 degrees of active forward elevation. External rotation with the arms at the side demonstrates 70 degree of passive motion.  Internal rotation is to waistband.  Abduction to about 40 degrees    Unable to perform empty can position for strength testing. Strength testing demonstrates 4/5 with empty can, 5/5 with resisted external rotation with the arm at the side.  5/5 strength of the subscapularis and a negative belly press.      Provocative testing unable to perform secondary to pain and limted ROM.    UE NV Exam: +2 Radial pulses bilaterally. Fingers are warm and well-perfused.  Sensation intact to light touch C5-T1 bilaterally, Radial/median/ulnar nerve motor intact      Shoulder Imaging    Radiographs of the left shoulder were obtained on 11/28 and 11/29 and reviewed with the patient.  Based on my independent evaluation, the imaging shows mild AC joint osteoarthritis but no acute fracture or dislocation.    MRI of the left shoulder from 6/20/2023 was reviewed with the patient.  Based on my independent evaluation, the MRI shows tendinopathy and a partial-thickness bursal sided tear of the supraspinatus.  No significant tendinopathy or tears of the subscapularis, infraspinatus, or teres minor.  No rotator cuff muscle atrophy noted.      Scribe Attestation      I,:  Ferny Arreaga PA-C am acting as a scribe while  in the presence of the attending physician.:       I,:  Lawrence Gabriel MD personally performed the services described in this documentation    as scribed in my presence.:

## 2024-01-24 NOTE — PROGRESS NOTES
Daily Note     Today's date: 2024  Patient name: Christiano Monroe  : 1970  MRN: [id number]  Referring provider: Lawrence Gabriel MD  Dx:   Encounter Diagnosis     ICD-10-CM    1. Traumatic incomplete tear of left rotator cuff, subsequent encounter  S46.012D                      Subjective: Oh, yea, I tried that one and right away I was like, NO. (Prone abduction)      Objective: See treatment diary below      Assessment: Tolerated treatment well. Patient exhibited good technique with therapeutic exercises and would benefit from continued OT Pt presents with decreased pain compared to previous session. Pt tolerated new horizontal retraction this date on green theraband. Pt participated ins killed OT this date with focus to ROM, UE strength/endurance, pain management, HEP development, and activity tolerance/engagement/endurance, for increased tolerance/safety/participation in ADL/IADL tasks. Decreased discomfort noted this date with horizontal abduction with green theraband, however increased discomfort with new prone abduction trialed. To try again next session at beginning of session.       Plan: Continue per plan of care.  Progress treatment as tolerated.   Progress treament per protocol.            Precautions: Left Shoulder Pain  Initial Evaluation completed on: 2023  Re-Evaluation needs to be completed before: 2024  Insurance: [employer]  FOTO: 2024  Auth Visits: N/A       Manuals 2023  1/15/2024 2024 2024   PROM Shoulder Supine All Planes   All planes 30 sec x 3                             Neuro Re-Ed  2023  1/15/2024 2024 2024                                                           Ther Ex 2023  1/15/2024 2024 2024   Body Blade Elbow Bent  1 min x 3   Elbow Bent  1 Min x 3 Elbow bent  1 min x 3   Pulleys  FF  ABD Seated  2 min  2 min  Seated  2 min  2 min     Finger Ladder  FF  Abd         Shoulder  Shrugs  Rolls  Retraction  Biceps Curl  Lateral Raises 1#          X 5 mod diff       Table Slides  FF  ABD        Supine SPC  FF  CP  ER  ABD  Extension  Abduction Prone            X 2 max diff  Supine  X 20        10 sec x 10   Supine        X 10    Digi-Flex        UBE Machine 5F/5B min L 3.0  5F/5B min L 2.5 5F/5B min L 2.5 5F/5B min L 3.0   TB  Sh Ext  Triceps Ext  ADD  Retraction  IR  ER  Biceps Curl  Horizontal Ret   Green  X 20  X 20  X 20  X 20  X 20  X 20  X 20  X 20  Green  X 20  X 20  X 20  X 20  X 20  X 20  X 20   Green  X 20  X 20  X 20  X 20  X 20  X 20  X 20 Green  X 20  X 20  X 20  X 20  X 20  X 20  X 20  X 20   Doorway Stretch Cross body delt stretch 30 sec x 3  Cross body delt stretch Cross body delt stretch Cross body delt stretch 30 sec x 3   Sleeper Stretch   Side Lying  20 sec x 3 Side Lying  20 sec x 3    Isometrics  FF  Ext  ER  IR  Abd                Ther Activity 1/24/2023  1/15/2024 01/18/2024 1/22/2024                                           Modalities 1/24/2023  1/15/2024 01/18/2024 1/22/2024   Ultrasound        MH        CP        E-STIM Shoulder W/MH post session 10'  W/ MH post session 10' W/ MH post session 15' W/MH post session 10'

## 2024-01-29 ENCOUNTER — APPOINTMENT (OUTPATIENT)
Dept: OCCUPATIONAL THERAPY | Facility: CLINIC | Age: 54
End: 2024-01-29
Payer: COMMERCIAL

## 2024-02-01 ENCOUNTER — OFFICE VISIT (OUTPATIENT)
Dept: OCCUPATIONAL THERAPY | Facility: CLINIC | Age: 54
End: 2024-02-01
Payer: COMMERCIAL

## 2024-02-01 DIAGNOSIS — S46.012D TRAUMATIC INCOMPLETE TEAR OF LEFT ROTATOR CUFF, SUBSEQUENT ENCOUNTER: Primary | ICD-10-CM

## 2024-02-01 PROCEDURE — 97014 ELECTRIC STIMULATION THERAPY: CPT

## 2024-02-01 PROCEDURE — G0283 ELEC STIM OTHER THAN WOUND: HCPCS

## 2024-02-01 PROCEDURE — 97110 THERAPEUTIC EXERCISES: CPT

## 2024-02-01 NOTE — PROGRESS NOTES
"Daily Note     Today's date: 2024  Patient name: Christiano Monroe  : 1970  MRN: [id number]  Referring provider: Lawrence Gabriel MD  Dx:   Encounter Diagnosis     ICD-10-CM    1. Traumatic incomplete tear of left rotator cuff, subsequent encounter  S46.012D                      Subjective: \"Could I do these seated.\"      Objective: See treatment diary below      Assessment: Tolerated treatment well. Patient exhibited good technique with therapeutic exercises and would benefit from continued OT. Patient had recent appointment with Ortho who was pleased with progress and would like patient to be off for another six weeks and to continue OT services. Patient continues to tolerated increases in several exercises to promote increased strength and functional use. Patient demonstrating with increased shoulder FF to WNL and is still limited with ABD compared to norms.      Plan: Continue per plan of care.  Progress treatment as tolerated.       Precautions: Left Shoulder Pain  Initial Evaluation completed on: 2023  Re-Evaluation needs to be completed before: 2024  Insurance: [employer]  FOTO: 2024  Auth Visits: N/A       Manuals 2024      PROM Shoulder Supine All Planes                                Neuro Re-Ed  2024                                                              Ther Ex 2024      Body Blade  Elbow Bent  FF  Scaption  ABD   1 min x 3     1 Min x 3  1 Min x 3      Pulleys  FF  ABD Seated  2 min  2 min       Thera-Ball Shoulder Abd up Wedges  X 10      Shoulder  Shrugs  Rolls  Retraction  Biceps Curl  Lateral Raises 1#          X 5 mod diff   4# X 20  4# X 20      1# 2 x 10 Seated      Table Slides  FF  ABD        Supine SPC  FF  CP  ER  ABD  Extension  Abduction Prone            X 2 max diff       Digi-Flex        UBE Machine 5F/5B min L 3.0 5F/5B min L 3.5      TB  Sh Ext  Triceps Ext  ADD  Retraction  IR  ER  Biceps " Curl  Horizontal Ret  FF Green  X 20  X 20  X 20  X 20  X 20  X 20  X 20  X 20 Blue  X 20  X 20  X 20  X 20  X 20  X 20  X 20  X 20  Red x 20      Doorway Stretch Cross body delt stretch 30 sec x 3 Cross body delt stretch 30 sec x 3      Sleeper Stretch        Isometrics  FF  Ext  ER  IR  Abd                Ther Activity 1/24/2023 02/01/2024                                              Modalities 1/24/2023 02/01/2024      Ultrasound                CP        E-STIM Shoulder W/MH post session 10' W/MH post session 10'

## 2024-02-02 NOTE — PROGRESS NOTES
Daily Note     Today's date: 2024  Patient name: Christiano Monroe  : 1970  MRN: [id number]  Referring provider: Lawrence Gabriel MD  Dx:   Encounter Diagnosis     ICD-10-CM    1. Traumatic incomplete tear of left rotator cuff, subsequent encounter  S46.012D                      Subjective: I feels pretty good today.      Objective: See treatment diary below      Assessment: Tolerated treatment well. Patient demonstrated fatigue post treatment, exhibited good technique with therapeutic exercises, and would benefit from continued OT. Denied pain post tx.      Plan: Continue per plan of care.  Progress treatment as tolerated.       Precautions: Left Shoulder Pain  Initial Evaluation completed on: 2023  Re-Evaluation needs to be completed before: 2024  Insurance: [employer]  FOTO: 2024  Auth Visits: N/A       Manuals 2024     PROM Shoulder Supine All Planes                                Neuro Re-Ed  2024                                                             Ther Ex 2024     Body Blade  Elbow Bent  FF  Scaption  ABD   1 min x 3     1 Min x 3  1 Min x 3     1 Min x 3  1 Min x 3     Pulleys  FF  ABD Seated  2 min  2 min       Thera-Ball Shoulder Abd up Wedges  X 10      Shoulder  Shrugs  Rolls  Retraction  Biceps Curl  Lateral Raises 1#          X 5 mod diff   4# X 20  4# X 20      1# 2 x 10 Seated   4# x 20  4# x 20      1# 2 x 10   seated     Table Slides  FF  ABD        Supine SPC  FF  CP  ER  ABD  Extension  Abduction Prone            X 2 max diff       Digi-Flex        UBE Machine 5F/5B min L 3.0 5F/5B min L 3.5 5F/5B min   L 3.5     TB  Sh Ext  Triceps Ext  ADD  Retraction  IR  ER  Biceps Curl  Horizontal Ret  FF  CP Green  X 20  X 20  X 20  X 20  X 20  X 20  X 20  X 20 Blue  X 20  X 20  X 20  X 20  X 20  X 20  X 20  X 20  Red x 20 Blue  X 20  X 20  X 20  X 20  X 20  X 20  X 20  X 20  Red x 20  X 20      Doorway Stretch Cross body delt stretch 30 sec x 3 Cross body delt stretch 30 sec x 3 Cross body delt stretch   30 sec x 3     Sleeper Stretch        Isometrics  FF  Ext  ER  IR  Abd                Ther Activity 1/24/2023 02/01/2024 2/5/2024                                             Modalities 1/24/2023 02/01/2024 2/5/2024     Ultrasound                CP        E-STIM Shoulder W/MH post session 10' W/MH post session 10' W/MH post tx   Session 10'

## 2024-02-05 ENCOUNTER — OFFICE VISIT (OUTPATIENT)
Dept: OCCUPATIONAL THERAPY | Facility: CLINIC | Age: 54
End: 2024-02-05
Payer: COMMERCIAL

## 2024-02-05 DIAGNOSIS — S46.012D TRAUMATIC INCOMPLETE TEAR OF LEFT ROTATOR CUFF, SUBSEQUENT ENCOUNTER: Primary | ICD-10-CM

## 2024-02-05 DIAGNOSIS — N52.9 ERECTILE DYSFUNCTION, UNSPECIFIED ERECTILE DYSFUNCTION TYPE: ICD-10-CM

## 2024-02-05 PROCEDURE — 97110 THERAPEUTIC EXERCISES: CPT

## 2024-02-06 RX ORDER — SILDENAFIL 100 MG/1
100 TABLET, FILM COATED ORAL AS NEEDED
Qty: 10 TABLET | Refills: 5 | Status: SHIPPED | OUTPATIENT
Start: 2024-02-06

## 2024-02-07 NOTE — PROGRESS NOTES
Daily Note     Today's date: 2024  Patient name: Christiano Monroe  : 1970  MRN: [id number]  Referring provider: Lawrence Gabriel MD  Dx:   Encounter Diagnosis     ICD-10-CM    1. Traumatic incomplete tear of left rotator cuff, subsequent encounter  S46.012D                      Subjective: My pain is a 2. It's getting better.      Objective: See treatment diary below      Assessment: Tolerated treatment well. Patient demonstrated fatigue post treatment, exhibited good technique with therapeutic exercises, and would benefit from continued OT. Paid pre and post tx 2. Tolerated additional exercises well.      Plan: Continue per plan of care.  Progress treatment as tolerated.       Precautions: Left Shoulder Pain  Initial Evaluation completed on: 2023  Re-Evaluation needs to be completed before: 2024  Insurance: [employer]  FOTO: 2024  Auth Visits: N/A       Manuals 2024    PROM Shoulder Supine All Planes                                Neuro Re-Ed  2024                                                            Ther Ex 2024    Body Blade  Elbow Bent  FF  Scaption  ABD   1 min x 3     1 Min x 3  1 Min x 3     1 Min x 3  1 Min x 3     1 Min x 3  1 Min x 3    Pulleys  FF  ABD Seated  2 min  2 min       Thera-Ball Shoulder Abd up Wedges  X 10      Shoulder  Shrugs  Rolls  Retraction  Biceps Curl  Lateral Raises 1#          X 5 mod diff   4# X 20  4# X 20      1# 2 x 10 Seated   4# x 20  4# x 20      1# 2 x 10   seated   4# x 20  4# x 20      1# 2 x 10 FF  Y x  10 in front of mirror    Table Slides  FF  ABD        Supine SPC  FF  CP  ER  ABD  Extension  Abduction Prone            X 2 max diff       Wall ER walk    Yellow x 7    UBE Machine 5F/5B min L 3.0 5F/5B min L 3.5 5F/5B min   L 3.5 5F/5B min  L 50    TB  Sh Ext  Triceps Ext  ADD  Retraction  IR  ER  Biceps Curl  Horizontal Ret  FF  CP  Green  X 20  X 20  X 20  X 20  X 20  X 20  X 20  X 20 Blue  X 20  X 20  X 20  X 20  X 20  X 20  X 20  X 20  Red x 20 Blue  X 20  X 20  X 20  X 20  X 20  X 20  X 20  X 20  Red x 20  X 20 Blue  X 20  X 20  X 20  X 20  X 20  X 20  X 20  X 20  X 20  X 20    Doorway Stretch Cross body delt stretch 30 sec x 3 Cross body delt stretch 30 sec x 3 Cross body delt stretch   30 sec x 3 Cross body delt stretch  30 sec x 3    Sleeper Stretch        Isometrics  FF  Ext  ER  IR  Abd                Ther Activity 1/24/2023 02/01/2024 2/5/2024 2/8/2024                                            Modalities 1/24/2023 02/01/2024 2/5/2024 2/8/2024    Ultrasound        MH        CP        E-STIM Shoulder W/MH post session 10' W/MH post session 10' W/MH post tx   Session 10' W/MH post tx session

## 2024-02-08 ENCOUNTER — OFFICE VISIT (OUTPATIENT)
Dept: OCCUPATIONAL THERAPY | Facility: CLINIC | Age: 54
End: 2024-02-08
Payer: COMMERCIAL

## 2024-02-08 DIAGNOSIS — S46.012D TRAUMATIC INCOMPLETE TEAR OF LEFT ROTATOR CUFF, SUBSEQUENT ENCOUNTER: Primary | ICD-10-CM

## 2024-02-08 PROCEDURE — 97014 ELECTRIC STIMULATION THERAPY: CPT

## 2024-02-08 PROCEDURE — 97110 THERAPEUTIC EXERCISES: CPT

## 2024-02-08 PROCEDURE — G0283 ELEC STIM OTHER THAN WOUND: HCPCS

## 2024-02-09 NOTE — PROGRESS NOTES
Daily Note     Today's date: 2024  Patient name: Christiano Monroe  : 1970  MRN: [id number]  Referring provider: Lawrence Gabriel MD  Dx:   Encounter Diagnosis     ICD-10-CM    1. Traumatic incomplete tear of left rotator cuff, subsequent encounter  S46.012D                      Subjective: It feels good today.      Objective: See treatment diary below      Assessment: Tolerated treatment well. Patient demonstrated fatigue post treatment, exhibited good technique with therapeutic exercises, and would benefit from continued OT. Denied pain pre and pos tx.      Plan: Continue per plan of care.  Progress treatment as tolerated.       Precautions: Left Shoulder Pain  Initial Evaluation completed on: 2023  Re-Evaluation needs to be completed before: 2024  Insurance: [employer]  FOTO: 2024  Auth Visits: N/A       Manuals   2024   PROM Shoulder Supine All Planes                                Neuro Re-Ed    2024                                                           Ther Ex   2024   Body Blade  Elbow Bent  FF  Scaption  ABD       1 Min x 3  1 Min x 3     1 Min x 3  1 Min x 3     1 min x 3  1 min x 3   Pulleys  FF  ABD        Thera-Ball wall stretch     10 sec x 10   Shoulder  Shrugs  Rolls  Retraction  Biceps Curl  Lateral Raises     4# x 20  4# x 20      1# 2 x 10   seated   4# x 20  4# x 20      1# 2 x 10 FF  Y x  10 in front of mirror   4# x 20  4# x 20        1# 10 x 2 FF  1# x 10 Y    Table Slides  FF  ABD        Supine SPC  FF  CP  ER  ABD  Extension  Abduction        Wall ER walk    Yellow x 7 Yellow x 10   UBE Machine   5F/5B min   L 3.5 5F/5B min  L 50 5F/5B min L 50   TB  Sh Ext  Triceps Ext  ADD  Retraction  IR  ER  Biceps Curl  Horizontal Ret  FF  CP   Blue  X 20  X 20  X 20  X 20  X 20  X 20  X 20  X 20  Red x 20  X 20 Blue  X 20  X 20  X 20  X 20  X 20  X 20  X 20  X 20  X 20  X 20 Blue  X 20  X 20  X  20  X 20  X 20  X 20  X 20  X 20  X 20  X 20   Doorway Stretch   Cross body delt stretch   30 sec x 3 Cross body delt stretch  30 sec x 3 Cross body delt stretch  30 sec x 3   Sleeper Stretch        Isometrics  FF  Ext  ER  IR  Abd                Ther Activity   2/5/2024 2/8/2024 2/12/2024                                           Modalities   2/5/2024 2/8/2024 2/14/2024   Ultrasound                CP        E-STIM Shoulder   W/MH post tx   Session 10' W/MH post tx session

## 2024-02-12 ENCOUNTER — OFFICE VISIT (OUTPATIENT)
Dept: OCCUPATIONAL THERAPY | Facility: CLINIC | Age: 54
End: 2024-02-12
Payer: COMMERCIAL

## 2024-02-12 DIAGNOSIS — S46.012D TRAUMATIC INCOMPLETE TEAR OF LEFT ROTATOR CUFF, SUBSEQUENT ENCOUNTER: Primary | ICD-10-CM

## 2024-02-12 PROCEDURE — 97110 THERAPEUTIC EXERCISES: CPT

## 2024-02-14 NOTE — PROGRESS NOTES
Daily Note     Today's date: 2024  Patient name: Christiano Monroe  : 1970  MRN: [id number]  Referring provider: Lawrence Gabriel MD  Dx:   Encounter Diagnosis     ICD-10-CM    1. Traumatic incomplete tear of left rotator cuff, subsequent encounter  S46.012D                      Subjective: It's stiff from shoveling snow.      Objective: See treatment diary below      Assessment: Tolerated treatment well. Patient demonstrated fatigue post treatment, exhibited good technique with therapeutic exercises, and would benefit from continued OT. Pain pre 6-7, post tx 3-4. Pt verbalized shoulder felt a little looser post tx.      Plan: Continue per plan of care.  Progress treatment as tolerated.       Precautions: Left Shoulder Pain  Initial Evaluation completed on: 2023  Re-Evaluation needs to be completed before: 2024  Insurance: [employer]  FOTO: 2024  Auth Visits: N/A       Manuals 2/15/2024  2024 2024 2024   PROM Shoulder Supine All Planes                                Neuro Re-Ed  2/15/2024  2024 2024 2024                                                           Ther Ex 2/15/2024  2024 2024 2024   Body Blade  Elbow Bent  FF  Scaption  ABD     1 min x 3  1 min x 3      1 Min x 3  1 Min x 3     1 Min x 3  1 Min x 3     1 min x 3  1 min x 3   Pulleys  FF  ABD        Thera-Ball wall stretch 10 sec x 10    10 sec x 10   Shoulder  Shrugs  Rolls  Retraction  Biceps Curl  Lateral Raises           1# 2 x 10  1# x 10 Y    4# x 20  4# x 20      1# 2 x 10   seated   4# x 20  4# x 20      1# 2 x 10 FF  Y x  10 in front of mirror   4# x 20  4# x 20        1# 10 x 2 FF  1# x 10 Y    Table Slides  FF  ABD        ER theraband 5 sec x 15       Wall ER walk Yellow x 10   Yellow x 7 Yellow x 10   UBE Machine 5F/5B min   L 50  5F/5B min   L 3.5 5F/5B min  L 50 5F/5B min L 50   TB  Sh Ext  Triceps Ext  ADD  Retraction  IR  ER  Biceps Curl  Horizontal Ret  FF  CP  Black  X 20  X 20  X 20  X 20  X 20  X 20  X 20  X 20  X 20  X 20  Blue  X 20  X 20  X 20  X 20  X 20  X 20  X 20  X 20  Red x 20  X 20 Blue  X 20  X 20  X 20  X 20  X 20  X 20  X 20  X 20  X 20  X 20 Blue  X 20  X 20  X 20  X 20  X 20  X 20  X 20  X 20  X 20  X 20   Doorway Stretch Cross body stretch  30 sec x 3  Cross body delt stretch   30 sec x 3 Cross body delt stretch  30 sec x 3 Cross body delt stretch  30 sec x 3   Sleeper Stretch        Isometrics  FF  Ext  ER  IR  Abd                Ther Activity 2/15/2024  2/5/2024 2/8/2024 2/12/2024                                           Modalities 2/15/2024  2/5/2024 2/8/2024 2/14/2024   Ultrasound        MH Post tx       CP        E-STIM Shoulder   W/MH post tx   Session 10' W/MH post tx session

## 2024-02-15 ENCOUNTER — OFFICE VISIT (OUTPATIENT)
Dept: OCCUPATIONAL THERAPY | Facility: CLINIC | Age: 54
End: 2024-02-15
Payer: COMMERCIAL

## 2024-02-15 ENCOUNTER — OFFICE VISIT (OUTPATIENT)
Dept: URGENT CARE | Facility: CLINIC | Age: 54
End: 2024-02-15
Payer: COMMERCIAL

## 2024-02-15 VITALS
HEART RATE: 72 BPM | TEMPERATURE: 98 F | OXYGEN SATURATION: 96 % | SYSTOLIC BLOOD PRESSURE: 131 MMHG | DIASTOLIC BLOOD PRESSURE: 93 MMHG | RESPIRATION RATE: 20 BRPM

## 2024-02-15 DIAGNOSIS — K12.2 UVULITIS: Primary | ICD-10-CM

## 2024-02-15 DIAGNOSIS — S46.012D TRAUMATIC INCOMPLETE TEAR OF LEFT ROTATOR CUFF, SUBSEQUENT ENCOUNTER: Primary | ICD-10-CM

## 2024-02-15 PROCEDURE — 99213 OFFICE O/P EST LOW 20 MIN: CPT | Performed by: FAMILY MEDICINE

## 2024-02-15 PROCEDURE — 97110 THERAPEUTIC EXERCISES: CPT

## 2024-02-15 PROCEDURE — 96372 THER/PROPH/DIAG INJ SC/IM: CPT | Performed by: FAMILY MEDICINE

## 2024-02-15 RX ORDER — DIPHENHYDRAMINE HYDROCHLORIDE 50 MG/ML
50 INJECTION INTRAMUSCULAR; INTRAVENOUS ONCE
Status: COMPLETED | OUTPATIENT
Start: 2024-02-15 | End: 2024-02-15

## 2024-02-15 RX ORDER — EPINEPHRINE 0.3 MG/.3ML
0.3 INJECTION SUBCUTANEOUS ONCE
Qty: 0.6 ML | Refills: 0 | Status: SHIPPED | OUTPATIENT
Start: 2024-02-15 | End: 2024-02-15

## 2024-02-15 RX ORDER — PREDNISONE 50 MG/1
50 TABLET ORAL DAILY
Qty: 5 TABLET | Refills: 0 | Status: SHIPPED | OUTPATIENT
Start: 2024-02-15 | End: 2024-02-20

## 2024-02-15 RX ORDER — METHYLPREDNISOLONE SODIUM SUCCINATE 125 MG/2ML
125 INJECTION, POWDER, LYOPHILIZED, FOR SOLUTION INTRAMUSCULAR; INTRAVENOUS ONCE
Status: COMPLETED | OUTPATIENT
Start: 2024-02-15 | End: 2024-02-15

## 2024-02-15 RX ADMIN — DIPHENHYDRAMINE HYDROCHLORIDE 50 MG: 50 INJECTION INTRAMUSCULAR; INTRAVENOUS at 13:15

## 2024-02-15 RX ADMIN — METHYLPREDNISOLONE SODIUM SUCCINATE 125 MG: 125 INJECTION, POWDER, LYOPHILIZED, FOR SOLUTION INTRAMUSCULAR; INTRAVENOUS at 13:15

## 2024-02-15 NOTE — PROGRESS NOTES
"  [facility]        NAME: Christiano Monroe is a 53 y.o. male  : 1970    MRN: [id number]  DATE: February 15, 2024  TIME: 1:57 PM    Assessment and Plan   Uvulitis [K12.2]  1. Uvulitis  methylPREDNISolone sodium succinate (Solu-MEDROL) injection 125 mg    diphenhydrAMINE (BENADRYL) injection 50 mg    EPINEPHrine (EPIPEN) 0.3 mg/0.3 mL SOAJ    predniSONE 50 mg tablet            Patient Instructions     Start prednisone tablets in the morning, 2024.  Should this flareup again, immediately give yourself a dose of the EpiPen, and call 911.  You can use Benadryl, 2 to 4 tablets, every 4-6 hours as needed.    Follow up with PCP in 3-5 days.  Proceed to  ER if symptoms worsen.    Chief Complaint     Chief Complaint   Patient presents with    Difficulty Swallowing     C/o difficulty swallowing onset \"15 minutes ago\". Pt denies any food allergies. Denies utilizing any OTC medications to aid symptoms. Uvula observed swollen. Pt states \"I feel like I have to keep swallowing or get a good drink or my throat is going to close on me\". 96% room air saturation.         History of Present Illness       Difficulty Swallowing (C/o difficulty swallowing onset \"15 minutes ago\". Pt denies any food allergies. Denies utilizing any OTC medications to aid symptoms. Uvula observed swollen. Pt states \"I feel like I have to keep swallowing or get a good drink or my throat is going to close on me\". 96% room air saturation.)  When he goes to cough, he can feel his uvula on the back of his throat and he starts to gag.        Review of Systems   Review of Systems   Constitutional:  Negative for chills, fatigue and fever.   HENT:  Positive for trouble swallowing. Negative for congestion, postnasal drip and rhinorrhea.    Respiratory:  Positive for cough and shortness of breath. Negative for wheezing and stridor.          Current Medications       Current Outpatient Medications:     EPINEPHrine (EPIPEN) 0.3 mg/0.3 mL SOAJ, " Inject 0.3 mL (0.3 mg total) into a muscle once for 1 dose, Disp: 0.6 mL, Rfl: 0    predniSONE 50 mg tablet, Take 1 tablet (50 mg total) by mouth daily for 5 days, Disp: 5 tablet, Rfl: 0    albuterol (ProAir HFA) 90 mcg/act inhaler, Inhale 2 puffs every 6 (six) hours as needed for wheezing or shortness of breath, Disp: 8.5 g, Rfl: 3    fluticasone (FLONASE) 50 mcg/act nasal spray, 2 sprays into each nostril daily, Disp: 16 g, Rfl: 3    lisinopril (ZESTRIL) 5 mg tablet, , Disp: , Rfl:     meloxicam (Mobic) 15 mg tablet, Take 1 tablet (15 mg total) by mouth daily, Disp: 30 tablet, Rfl: 0    methylPREDNISolone 4 MG tablet therapy pack, Use as directed on package (Patient not taking: Reported on 9/6/2023), Disp: 21 tablet, Rfl: 0    olopatadine (PATANOL) 0.1 % ophthalmic solution, Administer 1 drop to both eyes 2 (two) times a day (Patient not taking: Reported on 1/3/2024), Disp: 5 mL, Rfl: 3    sildenafil (VIAGRA) 100 mg tablet, Take 1 tablet (100 mg total) by mouth as needed for erectile dysfunction, Disp: 10 tablet, Rfl: 5    tamsulosin (FLOMAX) 0.4 mg, Take 1 capsule (0.4 mg total) by mouth daily with dinner, Disp: 30 capsule, Rfl: 11    umeclidinium-vilanterol (Anoro Ellipta) 62.5-25 mcg/actuation inhaler, Inhale 1 puff daily, Disp: 60 blister, Rfl: 3  No current facility-administered medications for this visit.    Current Allergies     Allergies as of 02/15/2024 - Reviewed 02/15/2024   Allergen Reaction Noted    Pollen extract Itching 04/10/2023            The following portions of the patient's history were reviewed and updated as appropriate: allergies, current medications, past family history, past medical history, past social history, past surgical history and problem list.     Past Medical History:   Diagnosis Date    Chest pain        History reviewed. No pertinent surgical history.    Family History   Problem Relation Age of Onset    Cancer Mother     Cancer Father     Heart disease Maternal Grandfather      Heart disease Paternal Grandfather     No Known Problems Daughter          Medications have been verified.        Objective   /93 (BP Location: Right arm, Patient Position: Sitting)   Pulse 72   Temp 98 °F (36.7 °C) (Temporal)   Resp 20   SpO2 96%   No LMP for male patient.       Physical Exam     Physical Exam  Vitals and nursing note reviewed.   Constitutional:       Appearance: Normal appearance. He is well-developed.   HENT:      Right Ear: Tympanic membrane and external ear normal.      Left Ear: Tympanic membrane and external ear normal.      Nose: Nose normal.      Mouth/Throat:      Mouth: Mucous membranes are moist.      Pharynx: No oropharyngeal exudate.      Comments: Uvula midline, but considerably swollen.  However, no total obstruction.  Eyes:      Conjunctiva/sclera: Conjunctivae normal.   Cardiovascular:      Rate and Rhythm: Normal rate and regular rhythm.      Heart sounds: Normal heart sounds. No murmur heard.  Pulmonary:      Effort: Pulmonary effort is normal. No respiratory distress.      Breath sounds: Normal breath sounds. No wheezing or rales.   Chest:      Chest wall: No tenderness.   Musculoskeletal:      Cervical back: Normal range of motion and neck supple.   Lymphadenopathy:      Cervical: No cervical adenopathy.   Neurological:      Mental Status: He is alert.     Patient was given IM Solu-Medrol and Benadryl.  Within 30 minutes, the patient felt considerably improved.  No difficulty breathing.  Patient could still feel that his uvula was slightly swollen, but much improved.  On exam, uvula was still slightly swollen, but approximately 50% the original size on arrival.

## 2024-02-15 NOTE — PATIENT INSTRUCTIONS
Start prednisone tablets in the morning, 12/16/2024.  Should this flareup again, immediately give yourself a dose of the EpiPen, and call 911. You can use Benadryl, 2 to 4 tablets, every 4-6 hours as needed.    Follow up with PCP in 3-5 days.  Proceed to  ER if symptoms worsen.

## 2024-02-16 NOTE — PROGRESS NOTES
Daily Note     Today's date: 2024  Patient name: Christiano Monroe  : 1970  MRN: [id number]  Referring provider: Lawrence Gabriel MD  Dx:   Encounter Diagnosis     ICD-10-CM    1. Traumatic incomplete tear of left rotator cuff, subsequent encounter  S46.012D                      Subjective: I don't have any pain today.      Objective: See treatment diary below      Assessment: Tolerated treatment well. Patient exhibited good technique with therapeutic exercises and would benefit from continued OT. Pain pre 0, post tx 1.      Plan: Continue per plan of care.  Progress treatment as tolerated.       Precautions: Left Shoulder Pain  Initial Evaluation completed on: 2023  Re-Evaluation needs to be completed before: 2024  Insurance: [employer]  FOTO: 2024  Auth Visits: N/A       Manuals 2/15/2024 2024 2024 2024 2024   PROM Shoulder Supine All Planes                                Neuro Re-Ed  2/15/2024 2024 2024 2024 2024                                                           Ther Ex 2/15/2024 2024 2024 2024 2024   Body Blade  Elbow Bent  FF  Scaption  ABD     1 min x 3  1 min x 3     1 min x 3  1 min x 3     1 Min x 3  1 Min x 3     1 Min x 3  1 Min x 3     1 min x 3  1 min x 3   Pulleys  FF  ABD        Thera-Ball wall stretch 10 sec x 10 10 sec x 10   10 sec x 10   Shoulder  Shrugs  Rolls  Retraction  Biceps Curl  Lateral Raises           1# 2 x 10  1# x 10 Y           1# 2 x 10  1# x 2 x 10 Y   4# x 20  4# x 20      1# 2 x 10   seated   4# x 20  4# x 20      1# 2 x 10 FF  Y x  10 in front of mirror   4# x 20  4# x 20        1# 10 x 2 FF  1# x 10 Y    Table Slides  FF  ABD        ER theraband 5 sec x 15 5 sec x 17      Wall ER walk Yellow x 10   Yellow x 7 Yellow x 10   UBE Machine 5F/5B min   L 50 5F/5B min  L 50 5F/5B min   L 3.5 5F/5B min  L 50 5F/5B min L 50   TB  Sh Ext  Triceps Ext  ADD  Retraction  IR  ER  Biceps  Curl  Abd  FF  CP Black  X 20  X 20  X 20  X 20  X 20  X 20  X 20  X 20  X 20  X 20 Black  X 20  X 20  X 20  X 20  X 20  X 20  X 20  X 7 red  X 20 red  X 20 Blue  X 20  X 20  X 20  X 20  X 20  X 20  X 20  X 20  Red x 20  X 20 Blue  X 20  X 20  X 20  X 20  X 20  X 20  X 20  X 20  X 20  X 20 Blue  X 20  X 20  X 20  X 20  X 20  X 20  X 20  X 20  X 20  X 20   Doorway Stretch Cross body stretch  30 sec x 3  Cross body delt stretch   30 sec x 3 Cross body delt stretch  30 sec x 3 Cross body delt stretch  30 sec x 3   Sleeper Stretch        Isometrics  FF  Ext  ER  IR  Abd                Ther Activity 2/15/2024 2/19/2024 2/5/2024 2/8/2024 2/12/2024                                           Modalities 2/15/2024 2/19/2024 2/5/2024 2/8/2024 2/14/2024   Ultrasound        MH Post tx       CP        E-STIM Shoulder   W/MH post tx   Session 10' W/MH post tx session

## 2024-02-19 ENCOUNTER — OFFICE VISIT (OUTPATIENT)
Dept: OCCUPATIONAL THERAPY | Facility: CLINIC | Age: 54
End: 2024-02-19
Payer: COMMERCIAL

## 2024-02-19 DIAGNOSIS — S46.012D TRAUMATIC INCOMPLETE TEAR OF LEFT ROTATOR CUFF, SUBSEQUENT ENCOUNTER: Primary | ICD-10-CM

## 2024-02-19 PROCEDURE — 97110 THERAPEUTIC EXERCISES: CPT

## 2024-02-21 NOTE — PROGRESS NOTES
Daily Note     Today's date: 2024  Patient name: Christiano Monroe  : 1970  MRN: [id number]  Referring provider: Lawrence Gabriel MD  Dx:   Encounter Diagnosis     ICD-10-CM    1. Traumatic incomplete tear of left rotator cuff, subsequent encounter  S46.012D                      Subjective: It's about a 1.      Objective: See treatment diary below      Assessment: Tolerated treatment well. Patient demonstrated fatigue post treatment, exhibited good technique with therapeutic exercises, and would benefit from continued OT. Pain pre and post tx 1.Tolerated additional exercises well.      Plan: Continue per plan of care.  Progress treatment as tolerated.       Precautions: Left Shoulder Pain  Initial Evaluation completed on: 2023  Re-Evaluation needs to be completed before: 2024  Insurance: [employer]  FOTO: 2024  Auth Visits: N/A       Manuals 2/15/2024 2024 2024  2024   PROM Shoulder Supine All Planes                                Neuro Re-Ed  2/15/2024 2024 2024  2024                                                           Ther Ex 2/15/2024 2024 2024  2024   Body Blade  Elbow Bent  FF  Scaption  ABD     1 min x 3  1 min x 3     1 min x 3  1 min x 3     1 min x 3  1 min x 3      1 min x 3  1 min x 3   Pulleys  FF  ABD        Thera-Ball wall stretch 10 sec x 10 10 sec x 10 10 sec x 10  10 sec x 10   Shoulder  Shrugs  Rolls  Retraction  Biceps Curl  Lateral Raises           1# 2 x 10  1# x 10 Y           1# 2 x 10  1# x 2 x 10 Y           1# 2 x 10  1# 2 x 10 Y  FF x 20    4# x 20  4# x 20        1# 10 x 2 FF  1# x 10 Y    Pulse   Yellow TB  30 sec x 2     ER theraband 5 sec x 15 5 sec x 17 5 sec x 20     Wall ER walk Yellow x 10  Yellow x 10  Yellow x 10   UBE Machine 5F/5B min   L 50 5F/5B min  L 50 5F/5B min   L 50  5F/5B min L 50   TB  Sh Ext  Triceps Ext  ADD  Retraction  IR  ER  Biceps Curl  Abd  FF  CP Black  X 20  X 20  X 20  X  20  X 20  X 20  X 20  X 20  X 20  X 20 Black  X 20  X 20  X 20  X 20  X 20  X 20  X 20  X 7 red  X 20 red  X 20 Black  X 20  X 20  X 20  X 20  X 20  X 20  X 20   X 10 red  X 20 red  X 20  Blue  X 20  X 20  X 20  X 20  X 20  X 20  X 20  X 20  X 20  X 20   Doorway Stretch Cross body stretch  30 sec x 3  Cross body stretch  30 sec x 3  Cross body delt stretch  30 sec x 3   Sleeper Stretch        Wall ER TB   Yellow x 10             Ther Activity 2/15/2024 2/19/2024 2/22/2024  2/12/2024                                           Modalities 2/15/2024 2/19/2024 2/22/2024  2/14/2024   Ultrasound        MH Post tx       CP        E-STIM Shoulder

## 2024-02-22 ENCOUNTER — OFFICE VISIT (OUTPATIENT)
Dept: OCCUPATIONAL THERAPY | Facility: CLINIC | Age: 54
End: 2024-02-22
Payer: COMMERCIAL

## 2024-02-22 DIAGNOSIS — S46.012D TRAUMATIC INCOMPLETE TEAR OF LEFT ROTATOR CUFF, SUBSEQUENT ENCOUNTER: Primary | ICD-10-CM

## 2024-02-22 PROCEDURE — 97110 THERAPEUTIC EXERCISES: CPT

## 2024-02-23 NOTE — PROGRESS NOTES
Daily Note     Today's date: 2024  Patient name: Christiano Monroe  : 1970  MRN: [id number]  Referring provider: Lawrence Gabriel MD  Dx:   Encounter Diagnosis     ICD-10-CM    1. Traumatic incomplete tear of left rotator cuff, subsequent encounter  S46.012D                      Subjective: It's feeling great.      Objective: See treatment diary below      Assessment: Tolerated treatment well. Patient demonstrated fatigue post treatment, exhibited good technique with therapeutic exercises, and would benefit from continued OT. Denied pain post tx.      Plan: Continue per plan of care.  Progress treatment as tolerated.       Precautions: Left Shoulder Pain  Initial Evaluation completed on: 2023  Re-Evaluation needs to be completed before: 2024  Insurance: [employer]  FOTO: 2024  Auth Visits: N/A       Manuals 2/15/2024 2024 2024 2024 2024   PROM Shoulder Supine All Planes                                Neuro Re-Ed  2/15/2024 2024 2024 2024 2024                                                           Ther Ex 2/15/2024 2024 2024 2024 2024   Body Blade  Elbow Bent  FF  Scaption  ABD     1 min x 3  1 min x 3     1 min x 3  1 min x 3     1 min x 3  1 min x 3     1 min x 3  1 min x 3     1 min x 3  1 min x 3   Pulleys  FF  ABD        Thera-Ball wall stretch 10 sec x 10 10 sec x 10 10 sec x 10 10 sec x 10 10 sec x 10   Shoulder  Shrugs  Rolls  Retraction  Biceps Curl  Lateral Raises           1# 2 x 10  1# x 10 Y           1# 2 x 10  1# x 2 x 10 Y           1# 2 x 10  1# 2 x 10 Y  FF x 20             1# 2 x 10 Y   4# x 20  4# x 20        1# 10 x 2 FF  1# x 10 Y    Pulse   Yellow TB  30 sec x 2 Yellow TB  30 sec x 2    ER theraband 5 sec x 15 5 sec x 17 5 sec x 20 5 sec x 20    Wall ER walk Yellow x 10  Yellow x 10 Yellow x 10 Yellow x 10   UBE Machine 5F/5B min   L 50 5F/5B min  L 50 5F/5B min   L 50 5F/5Bmin  L 50 5F/5B min L 50    TB  Sh Ext  Triceps Ext  ADD  Retraction  IR  ER  Biceps Curl  Abd  FF  CP Black  X 20  X 20  X 20  X 20  X 20  X 20  X 20  X 20  X 20  X 20 Black  X 20  X 20  X 20  X 20  X 20  X 20  X 20  X 7 red  X 20 red  X 20 Black  X 20  X 20  X 20  X 20  X 20  X 20  X 20   X 10 red  X 20 red  X 20 Black  X 20  X 20  X 20  X 20  X 20  X 20  X 20  X 10 red  X 20 red  X 20     Blue  X 20  X 20  X 20  X 20  X 20  X 20  X 20  X 20  X 20  X 20   Doorway Stretch Cross body stretch  30 sec x 3  Cross body stretch  30 sec x 3 Cross body stretch  30 sec x 3 Cross body delt stretch  30 sec x 3   Sleeper Stretch        Wall ER TB   Yellow x 10 Yellow x 10            Ther Activity 2/15/2024 2/19/2024 2/22/2024 2/26/2024 2/12/2024                                           Modalities 2/15/2024 2/19/2024 2/22/2024 2/26/2024 2/14/2024   Ultrasound        MH Post tx       CP        E-STIM Shoulder

## 2024-02-26 ENCOUNTER — OFFICE VISIT (OUTPATIENT)
Dept: OCCUPATIONAL THERAPY | Facility: CLINIC | Age: 54
End: 2024-02-26
Payer: COMMERCIAL

## 2024-02-26 DIAGNOSIS — S46.012D TRAUMATIC INCOMPLETE TEAR OF LEFT ROTATOR CUFF, SUBSEQUENT ENCOUNTER: Primary | ICD-10-CM

## 2024-02-26 PROCEDURE — 97110 THERAPEUTIC EXERCISES: CPT

## 2024-02-28 NOTE — PROGRESS NOTES
Daily Note     Today's date: 2024  Patient name: Christiano Monroe  : 1970  MRN: [id number]  Referring provider: Lawrence Gabriel MD  Dx:   Encounter Diagnosis     ICD-10-CM    1. Traumatic incomplete tear of left rotator cuff, subsequent encounter  S46.012D                      Subjective: I have a hard time reaching stuff in my cupboard and bringing them down.      Objective: See treatment diary below      Assessment: Tolerated treatment well. Patient demonstrated fatigue post treatment, exhibited good technique with therapeutic exercises, and would benefit from continued OT. Denied pain pre and post tx. Tolerated functional reaching task well.      Plan: Continue per plan of care.  Progress treatment as tolerated.       Precautions: Left Shoulder Pain  Initial Evaluation completed on: 2023  Re-Evaluation needs to be completed before: 2024  Insurance: [employer]  FOTO: 2024  Auth Visits: N/A       Manuals   2024   PROM Shoulder Supine All Planes                                Neuro Re-Ed    2024                                                           Ther Ex   2024   Body Blade  Elbow Bent  FF  Scaption  ABD       1 min x 3  1 min x 3     1 min x 3  1 min x 3     1 min x 3  1 min x 3   Pulleys  FF  ABD        Thera-Ball wall stretch   10 sec x 10 10 sec x 10 10 sec x 10   Shoulder  Shrugs  Rolls  Retraction  Biceps Curl  Lateral Raises             1# 2 x 10  1# 2 x 10 Y  FF x 20             1# 2 x 10 Y              Pulse   Yellow TB  30 sec x 2 Yellow TB  30 sec x 2 Yellow TB  30 sec x 2   ER theraband   5 sec x 20 5 sec x 20 5 sec x 20   Wall ER walk   Yellow x 10 Yellow x 10 Yelllow x 10   UBE Machine   5F/5B min   L 50 5F/5Bmin  L 50 5F/5B min   L 3.0   TB  Sh Ext  Triceps Ext  ADD  Retraction  IR  ER  Biceps Curl  Abd  FF  CP   Black  X 20  X 20  X 20  X 20  X 20  X 20  X 20   X 10 red  X 20 red  X 20  Black  X 20  X 20  X 20  X 20  X 20  X 20  X 20  X 10 red  X 20 red  X 20     Black  X 20  X 20  X 20  X 20  X 20  X 20  X 20  X 20 red  X 20 red  X 20   Doorway Stretch   Cross body stretch  30 sec x 3 Cross body stretch  30 sec x 3 Crossbody stretch  30 sec x 3   Sleeper Stretch        Wall ER TB   Yellow x 10 Yellow x 10 Yellow x 10           Ther Activity   2/22/2024 2/26/2024 2/29/2024   Functional reaching     Removed items from each shelf w/LUE, cones, and 1# wt                                   Modalities   2/22/2024 2/26/2024 2/29/2024   Ultrasound        MH        CP        E-STIM Shoulder

## 2024-02-29 ENCOUNTER — OFFICE VISIT (OUTPATIENT)
Dept: OCCUPATIONAL THERAPY | Facility: CLINIC | Age: 54
End: 2024-02-29
Payer: COMMERCIAL

## 2024-02-29 ENCOUNTER — OFFICE VISIT (OUTPATIENT)
Dept: FAMILY MEDICINE CLINIC | Facility: CLINIC | Age: 54
End: 2024-02-29
Payer: COMMERCIAL

## 2024-02-29 VITALS
HEART RATE: 95 BPM | SYSTOLIC BLOOD PRESSURE: 126 MMHG | BODY MASS INDEX: 39.56 KG/M2 | OXYGEN SATURATION: 95 % | DIASTOLIC BLOOD PRESSURE: 82 MMHG | WEIGHT: 261 LBS | HEIGHT: 68 IN

## 2024-02-29 DIAGNOSIS — F17.210 CIGARETTE NICOTINE DEPENDENCE WITHOUT COMPLICATION: ICD-10-CM

## 2024-02-29 DIAGNOSIS — Z11.59 NEED FOR HEPATITIS C SCREENING TEST: ICD-10-CM

## 2024-02-29 DIAGNOSIS — Z12.11 SCREENING FOR COLON CANCER: ICD-10-CM

## 2024-02-29 DIAGNOSIS — F17.210 SMOKES LESS THAN 1/2 PACK A DAY WITH GREATER THAN 20 PACK YEAR HISTORY: ICD-10-CM

## 2024-02-29 DIAGNOSIS — F17.210 SMOKING GREATER THAN 20 PACK YEARS: ICD-10-CM

## 2024-02-29 DIAGNOSIS — R73.9 ELEVATED BLOOD SUGAR: ICD-10-CM

## 2024-02-29 DIAGNOSIS — Z11.4 SCREENING FOR HIV (HUMAN IMMUNODEFICIENCY VIRUS): ICD-10-CM

## 2024-02-29 DIAGNOSIS — S46.012D TRAUMATIC INCOMPLETE TEAR OF LEFT ROTATOR CUFF, SUBSEQUENT ENCOUNTER: Primary | ICD-10-CM

## 2024-02-29 DIAGNOSIS — Z13.220 ENCOUNTER FOR SCREENING FOR LIPID DISORDER: ICD-10-CM

## 2024-02-29 DIAGNOSIS — E66.01 SEVERE OBESITY (BMI 35.0-39.9) WITH COMORBIDITY (HCC): ICD-10-CM

## 2024-02-29 DIAGNOSIS — F39 MOOD DISORDER IN FULL REMISSION (HCC): ICD-10-CM

## 2024-02-29 DIAGNOSIS — J41.0 SIMPLE CHRONIC BRONCHITIS (HCC): ICD-10-CM

## 2024-02-29 DIAGNOSIS — G47.33 OSA (OBSTRUCTIVE SLEEP APNEA): ICD-10-CM

## 2024-02-29 DIAGNOSIS — Z00.00 WELLNESS EXAMINATION: Primary | ICD-10-CM

## 2024-02-29 DIAGNOSIS — B35.3 TINEA PEDIS OF BOTH FEET: ICD-10-CM

## 2024-02-29 PROBLEM — L72.9 INFECTED CYST OF SKIN: Status: RESOLVED | Noted: 2022-09-02 | Resolved: 2024-02-29

## 2024-02-29 PROBLEM — L08.9 INFECTED CYST OF SKIN: Status: RESOLVED | Noted: 2022-09-02 | Resolved: 2024-02-29

## 2024-02-29 PROBLEM — Z09 ENCOUNTER FOR FOLLOW-UP: Status: RESOLVED | Noted: 2018-05-04 | Resolved: 2024-02-29

## 2024-02-29 PROBLEM — R07.9 CHEST PAIN: Status: RESOLVED | Noted: 2023-04-20 | Resolved: 2024-02-29

## 2024-02-29 PROBLEM — R06.83 SNORING: Status: RESOLVED | Noted: 2023-05-03 | Resolved: 2024-02-29

## 2024-02-29 PROCEDURE — 97530 THERAPEUTIC ACTIVITIES: CPT

## 2024-02-29 PROCEDURE — 97110 THERAPEUTIC EXERCISES: CPT

## 2024-02-29 PROCEDURE — 99386 PREV VISIT NEW AGE 40-64: CPT | Performed by: INTERNAL MEDICINE

## 2024-02-29 RX ORDER — TERBINAFINE HYDROCHLORIDE 250 MG/1
250 TABLET ORAL DAILY
Qty: 42 TABLET | Refills: 0 | Status: SHIPPED | OUTPATIENT
Start: 2024-02-29 | End: 2024-04-11

## 2024-02-29 NOTE — PROGRESS NOTES
ADULT ANNUAL PHYSICAL  [facility]    NAME: Christiano Monroe  AGE: 53 y.o. SEX: male  : 1970     DATE: 2024     Assessment and Plan:     Problem List Items Addressed This Visit       Cigarette nicotine dependence without complication     Discussed smoking cessation, and the need for follow-up for low-dose CAT scan.  He is not interested in stopping smoking at this time.  Will proceed with low-dose CAT scan.         PAT (obstructive sleep apnea)     Remains on a CPAP does quite well.  Has not had it just in some time.  Feels much better on this.         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  He is doing quite well, and able to manage his rage and agitation.  Feels pretty good, is in a good place.  Had been diagnosed with lower and bipolar disorder, versus mood disturbance.         Wellness examination - Primary     Discussed diet exercise and multiple vaccines.  He will decide on these if he wants them or not since he is 53.  Might consider pneumonia shot given his underlying tobacco use and some chronic bronchitis issues.         Tinea pedis of both feet     Lamisil 250 for 6 weeks given his failure of outpatient no over-the-counter remedies.         Relevant Medications    terbinafine (LamISIL) 250 mg tablet    Simple chronic bronchitis (HCC)     He did very well on Anoro, for some reason his recent pulmonologist just stopped him.  Will renew this prescription for him.         Relevant Medications    umeclidinium-vilanterol 62.5-25 mcg/actuation inhaler     Other Visit Diagnoses       Need for hepatitis C screening test        Relevant Orders    Hepatitis C Antibody    Smokes less than 1/2 pack a day with greater than 20 pack year history        Smoking greater than 20 pack years        Relevant Orders    CT lung screening program    Screening for  HIV (human immunodeficiency virus)        Relevant Orders    HIV 1/2 AG/AB w Reflex [facility] for 2 yr old and above    Screening for colon cancer        Relevant Orders    Ambulatory referral to Gastroenterology    Elevated blood sugar        Relevant Orders    CBC and differential    Comprehensive metabolic panel    Hemoglobin A1C    Encounter for screening for lipid disorder        Relevant Orders    Lipid Panel with Direct LDL reflex    Severe obesity (BMI 35.0-39.9) with comorbidity (HCC)        He does have follow-up with weight management.            Immunizations and preventive care screenings were discussed with patient today. Appropriate education was printed on patient's after visit summary.    Discussed risks and benefits of prostate cancer screening. We discussed the controversial history of PSA screening for prostate cancer in the [place] as well as the risk of over detection and over treatment of prostate cancer by way of PSA screening.  The patient understands that PSA blood testing is an imperfect way to screen for prostate cancer and that elevated PSA levels in the blood may also be caused by infection, inflammation, prostatic trauma or manipulation, urological procedures, or by benign prostatic enlargement.    The role of the digital rectal examination in prostate cancer screening was also discussed and I discussed with him that there is large interobserver variability in the findings of digital rectal examination.    Counseling:  Dental Health: discussed importance of regular tooth brushing, flossing, and dental visits.  Exercise: the importance of regular exercise/physical activity was discussed. Recommend exercise 3-5 times per week for at least 30 minutes.       Tobacco Cessation Counseling: Tobacco cessation counseling was provided. The patient is sincerely urged to quit consumption of tobacco. He is not ready to quit tobacco. Medication options and side effects of medication discussed.  Patient refused medication.         Return in about 1 year (around 2/28/2025) for Annual physical.     Chief Complaint:     Chief Complaint   Patient presents with    Physical Exam    foot issue     Rt foot, itching, peeling, athletes foot      History of Present Illness:     Adult Annual Physical   Patient here for a comprehensive physical exam. The patient reports no problems.    Diet and Physical Activity  Diet/Nutrition: well balanced diet and low carb diet.   Exercise: no formal exercise.      Depression Screening  PHQ-2/9 Depression Screening    Little interest or pleasure in doing things: 0 - not at all  Feeling down, depressed, or hopeless: 0 - not at all       General Health  Sleep: sleeps well and gets 7-8 hours of sleep on average.   Hearing: normal - none .  Vision: no vision problems.   Dental: regular dental visits.        Health  Symptoms include: nocturia    Advanced Care Planning  Do you have an advanced directive? no  Do you have a durable medical power of ? no  ACP document given to patient? no     Review of Systems:     Review of Systems   Constitutional:  Positive for unexpected weight change. Negative for chills and fever.   HENT:  Negative for rhinorrhea and sore throat.    Eyes:  Negative for visual disturbance.   Respiratory:  Negative for cough and shortness of breath.    Cardiovascular:  Negative for chest pain and leg swelling.   Gastrointestinal:  Negative for abdominal pain, diarrhea, nausea and vomiting.   Genitourinary:  Negative for dysuria.   Musculoskeletal:  Positive for arthralgias and back pain. Negative for myalgias.   Skin:  Negative for rash.   Neurological:  Negative for dizziness and headaches.   Psychiatric/Behavioral:  Negative for confusion.    All other systems reviewed and are negative.     Past Medical History:     Past Medical History:   Diagnosis Date    Chest pain       Past Surgical History:     History reviewed. No pertinent surgical history.   Family  History:     Family History   Problem Relation Age of Onset    Cancer Mother     Cancer Father     Heart disease Maternal Grandfather     Heart disease Paternal Grandfather     No Known Problems Daughter       Social History:     Social History     Socioeconomic History    Marital status: /Civil Union     Spouse name: None    Number of children: None    Years of education: None    Highest education level: None   Occupational History    None   Tobacco Use    Smoking status: Every Day     Current packs/day: 0.50     Average packs/day: 1 pack/day for 39.2 years (38.6 ttl pk-yrs)     Types: Cigarettes     Start date: 1985     Passive exposure: Never    Smokeless tobacco: Never    Tobacco comments:     10 to 12 cigarettes a day he did  quit from [id number]   Vaping Use    Vaping status: Never Used   Substance and Sexual Activity    Alcohol use: Yes     Comment: occasionally    Drug use: No    Sexual activity: Yes   Other Topics Concern    None   Social History Narrative    None     Social Determinants of Health     Financial Resource Strain: Not on file   Food Insecurity: Not on file   Transportation Needs: Not on file   Physical Activity: Not on file   Stress: Not on file   Social Connections: Not on file   Intimate Partner Violence: Not on file   Housing Stability: Not on file      Current Medications:     Current Outpatient Medications   Medication Sig Dispense Refill    albuterol (ProAir HFA) 90 mcg/act inhaler Inhale 2 puffs every 6 (six) hours as needed for wheezing or shortness of breath 8.5 g 3    EPINEPHrine (EPIPEN) 0.3 mg/0.3 mL SOAJ Inject 0.3 mL (0.3 mg total) into a muscle once for 1 dose 0.6 mL 0    fluticasone (FLONASE) 50 mcg/act nasal spray 2 sprays into each nostril daily 16 g 3    tamsulosin (FLOMAX) 0.4 mg Take 1 capsule (0.4 mg total) by mouth daily with dinner 30 capsule 11    terbinafine (LamISIL) 250 mg tablet Take 1 tablet (250 mg total) by mouth daily 42 tablet 0     "umeclidinium-vilanterol 62.5-25 mcg/actuation inhaler Inhale 1 puff daily 180 blister 3    olopatadine (PATANOL) 0.1 % ophthalmic solution Administer 1 drop to both eyes 2 (two) times a day (Patient not taking: Reported on 1/3/2024) 5 mL 3     No current facility-administered medications for this visit.      Allergies:     Allergies   Allergen Reactions    Pollen Extract Itching      Physical Exam:     /82 (BP Location: Left arm, Patient Position: Sitting, Cuff Size: Adult)   Pulse 95   Ht 5' 8\" (1.727 m)   Wt 118 kg (261 lb)   SpO2 95%   BMI 39.68 kg/m²     Physical Exam  Vitals and nursing note reviewed.   Constitutional:       General: He is not in acute distress.     Appearance: He is well-developed.   HENT:      Head: Normocephalic and atraumatic.   Eyes:      Conjunctiva/sclera: Conjunctivae normal.   Cardiovascular:      Rate and Rhythm: Normal rate and regular rhythm.      Heart sounds: No murmur heard.  Pulmonary:      Effort: Pulmonary effort is normal. No respiratory distress.      Breath sounds: Normal breath sounds.   Abdominal:      Palpations: Abdomen is soft.      Tenderness: There is no abdominal tenderness.   Musculoskeletal:         General: Tenderness present. No swelling.      Cervical back: Neck supple.      Comments: Pain and decreased ROM left shoulder    Skin:     General: Skin is warm and dry.      Capillary Refill: Capillary refill takes less than 2 seconds.   Neurological:      Mental Status: He is alert.   Psychiatric:         Mood and Affect: Mood normal.          Jose L Escamilla DO  [facility]    "

## 2024-02-29 NOTE — ASSESSMENT & PLAN NOTE
Discussed smoking cessation, and the need for follow-up for low-dose CAT scan.  He is not interested in stopping smoking at this time.  Will proceed with low-dose CAT scan.

## 2024-02-29 NOTE — ASSESSMENT & PLAN NOTE
He did very well on Anoro, for some reason his recent pulmonologist just stopped him.  Will renew this prescription for him.

## 2024-02-29 NOTE — ASSESSMENT & PLAN NOTE
Discussed diet exercise and multiple vaccines.  He will decide on these if he wants them or not since he is 53.  Might consider pneumonia shot given his underlying tobacco use and some chronic bronchitis issues.

## 2024-02-29 NOTE — ASSESSMENT & PLAN NOTE
Had been on lithium in the past as well as some SSRI, these have all been tapered.  He is doing quite well, and able to manage his rage and agitation.  Feels pretty good, is in a good place.  Had been diagnosed with lower and bipolar disorder, versus mood disturbance.

## 2024-03-01 NOTE — PROGRESS NOTES
Daily Note     Today's date: 3/1/2024  Patient name: Christiano Monroe  : 1970  MRN: [id number]  Referring provider: Lawrence Gabriel MD  Dx:   Encounter Diagnosis     ICD-10-CM    1. Traumatic incomplete tear of left rotator cuff, subsequent encounter  S46.012D                      Subjective: It's getting better everyday.      Objective: See treatment diary below      Assessment: Tolerated treatment well. Patient demonstrated fatigue post treatment, exhibited good technique with therapeutic exercises, and would benefit from continued OT. Denied pain pre and post tx. IR tightness noted, pt verbalized it felt looser post stretching.      Plan: Continue per plan of care.  Progress treatment as tolerated.       Precautions: Left Shoulder Pain  Initial Evaluation completed on: 2023  Re-Evaluation needs to be completed before: 2024  Insurance: [employer]  FOTO: 2024  Auth Visits: N/A       Manuals 3/4/2024  2024 2024 2024   PROM Shoulder Supine All Planes                                Neuro Re-Ed  3/4/2024  2024 2024 2024                                                           Ther Ex 3/4/2024  2024 2024 2024   Body Blade  Elbow Bent  FF  Scaption  ABD     1 min x 3  1 min x 3      1 min x 3  1 min x 3     1 min x 3  1 min x 3     1 min x 3  1 min x 3   Pulleys  FF  ABD   X 20  X 20       Thera-Ball wall stretch 10 sec  x 10  10 sec x 10 10 sec x 10 10 sec x 10   Shoulder  Shrugs  Rolls  Retraction  Biceps Curl  Lateral Raises             1# 2 x 10  1# 2 x 10 Y  FF x 20             1# 2 x 10 Y              Pulse Yellow TB  30 sec x 2  Yellow TB  30 sec x 2 Yellow TB  30 sec x 2 Yellow TB  30 sec x 2   ER theraband 5 sec x 20  5 sec x 20 5 sec x 20 5 sec x 20   Wall ER walk Yellow x 10  Yellow x 10 Yellow x 10 Yelllow x 10   UBE Machine 5F/5B min L 50  5F/5B min   L 50 5F/5Bmin  L 50 5F/5B min   L 3.0   TB  Sh Ext  Triceps  Ext  ADD  Retraction  IR  ER  Biceps Curl  Abd  FF  CP Black  X 20  X 20  X 20  X 20  X 20  X 20  X 20  X 20 red  X 20 red  X 20  Black  X 20  X 20  X 20  X 20  X 20  X 20  X 20   X 10 red  X 20 red  X 20 Black  X 20  X 20  X 20  X 20  X 20  X 20  X 20  X 10 red  X 20 red  X 20     Black  X 20  X 20  X 20  X 20  X 20  X 20  X 20  X 20 red  X 20 red  X 20   Doorway Stretch Cross body stretch  30 sec x 3  Cross body stretch  30 sec x 3 Cross body stretch  30 sec x 3 Crossbody stretch  30 sec x 3   Sleeper Stretch 20 sec x 3       Wall ER TB Yellow x 10  Yellow x 10 Yellow x 10 Yellow x 10           Ther Activity 3/4/2024  2/22/2024 2/26/2024 2/29/2024   Functional reaching Removed items from each shelf w/LUE w/1# CW    Removed items from each shelf w/LUE, cones, and 1# wt                                   Modalities 3/4/2024  2/22/2024 2/26/2024 2/29/2024   Ultrasound        MH        CP        E-STIM Shoulder

## 2024-03-04 ENCOUNTER — OFFICE VISIT (OUTPATIENT)
Dept: OCCUPATIONAL THERAPY | Facility: CLINIC | Age: 54
End: 2024-03-04
Payer: COMMERCIAL

## 2024-03-04 DIAGNOSIS — S46.012D TRAUMATIC INCOMPLETE TEAR OF LEFT ROTATOR CUFF, SUBSEQUENT ENCOUNTER: Primary | ICD-10-CM

## 2024-03-04 PROCEDURE — 97110 THERAPEUTIC EXERCISES: CPT

## 2024-03-04 PROCEDURE — 97530 THERAPEUTIC ACTIVITIES: CPT

## 2024-03-06 ENCOUNTER — OFFICE VISIT (OUTPATIENT)
Dept: OBGYN CLINIC | Facility: CLINIC | Age: 54
End: 2024-03-06
Payer: COMMERCIAL

## 2024-03-06 VITALS
HEART RATE: 89 BPM | HEIGHT: 68 IN | DIASTOLIC BLOOD PRESSURE: 84 MMHG | WEIGHT: 266 LBS | SYSTOLIC BLOOD PRESSURE: 125 MMHG | BODY MASS INDEX: 40.32 KG/M2

## 2024-03-06 DIAGNOSIS — M25.512 LEFT SHOULDER PAIN, UNSPECIFIED CHRONICITY: ICD-10-CM

## 2024-03-06 DIAGNOSIS — S46.012A TRAUMATIC INCOMPLETE TEAR OF LEFT ROTATOR CUFF, INITIAL ENCOUNTER: Primary | ICD-10-CM

## 2024-03-06 PROCEDURE — 99213 OFFICE O/P EST LOW 20 MIN: CPT | Performed by: STUDENT IN AN ORGANIZED HEALTH CARE EDUCATION/TRAINING PROGRAM

## 2024-03-06 NOTE — PROGRESS NOTES
Daily Note     Today's date: 3/6/2024  Patient name: Christiano Monroe  : 1970  MRN: [id number]  Referring provider: Lawrence Gabriel MD  Dx:   Encounter Diagnosis     ICD-10-CM    1. Traumatic incomplete tear of left rotator cuff, subsequent encounter  S46.012D                      Subjective: No new c/o's      Objective: See treatment diary below      Assessment: Tolerated treatment well. Patient exhibited good technique with therapeutic exercises and would benefit from continued OT.      Plan: Continue per plan of care.  Progress treatment as tolerated.       Precautions: Left Shoulder Pain  Initial Evaluation completed on: 2023  Re-Evaluation needs to be completed before: 2024  Insurance: [employer]  FOTO: 2024  Auth Visits: N/A       Manuals 3/4/2024 3/7/2024  2024 2024   PROM Shoulder Supine All Planes                                Neuro Re-Ed  3/4/2024 3/7/2024  2024 2024                                                           Ther Ex 3/4/2024 3/7/2024  2024 2024   Body Blade  Elbow Bent  FF  Scaption  ABD     1 min x 3  1 min x 3     1 min x 3  1 min x 3      1 min x 3  1 min x 3     1 min x 3  1 min x 3   Pulleys  FF  ABD   X 20  X 20   X 20  X 20      Thera-Ball wall stretch 10 sec  x 10 10 sec x 10  10 sec x 10 10 sec x 10   Shoulder  Shrugs  Rolls  Retraction  Biceps Curl  Lateral Raises                1# 2 x 10 Y              Pulse Yellow TB  30 sec x 2 Yellow TB  30 sec x 2  Yellow TB  30 sec x 2 Yellow TB  30 sec x 2   ER theraband 5 sec x 20 5 sec x 20  5 sec x 20 5 sec x 20   Wall ER walk Yellow x 10 Yellow x 10  Yellow x 10 Yelllow x 10   UBE Machine 5F/5B min L 50 5F/5B min L 50  5F/5Bmin  L 50 5F/5B min   L 3.0   TB  Sh Ext  Triceps Ext  ADD  Retraction  IR  ER  Biceps Curl  Abd  FF  CP Black  X 20  X 20  X 20  X 20  X 20  X 20  X 20  X 20 red  X 20 red  X 20 Black  X 20  X 20  X 20  X 20  X 20  X 20  X 20  X 20 red  X 20 red  X 20   Black  X 20  X 20  X 20  X 20  X 20  X 20  X 20  X 10 red  X 20 red  X 20     Black  X 20  X 20  X 20  X 20  X 20  X 20  X 20  X 20 red  X 20 red  X 20   Doorway Stretch Cross body stretch  30 sec x 3 Cross body stretch  30 secx 3  Cross body stretch  30 sec x 3 Crossbody stretch  30 sec x 3   Sleeper Stretch 20 sec x 3 20 sec x 3      Wall ER TB Yellow x 10 Yellow x 10  Yellow x 10 Yellow x 10           Ther Activity 3/4/2024 3/7/2024  2/26/2024 2/29/2024   Functional reaching Removed items from each shelf w/LUE w/1# CW Removed items from each shelf  W/LUE w/1# CW   Removed items from each shelf w/LUE, cones, and 1# wt                                   Modalities 3/4/2024 3/7/2024  2/26/2024 2/29/2024   Ultrasound                CP        E-STIM Shoulder

## 2024-03-06 NOTE — LETTER
March 6, 2024     Patient: Christiano Monroe  YOB: 1970  Date of Visit: 3/6/2024      To Whom it May Concern:    Christiano Monroe is under my professional care. Christiano Espino was seen in my office on 3/6/2024. Christiano Espino may not return to work until seen in reevaluation in 4 weeks at which time his restrictions will be reassessed.     If you have any questions or concerns, please don't hesitate to call.         Sincerely,          Lawrence Gabriel MD        CC: No Recipients

## 2024-03-06 NOTE — PROGRESS NOTES
Ortho Sports Medicine Shoulder New Patient Visit     Assesment:   53 y.o. male with left shoulder pain, weakness and limited ROM after a fall on 11/28/24 with MRI showing a partial thickness supraspinatus tear and a grade 2 strain of the anterolateral deltoid muscle.    Plan:  I reviewed the history and exam with the patient in clinic today.  Patient has been doing physical therapy and states that his shoulder function has improved significantly.  He is only having mild pain and is not taking anti-inflammatory medications at this point.  He is overall happy with the progress he has made with physical therapy but wishes to continue as he still has some weakness with abduction that would limit his ability to do his job.  Recommended another 4 weeks of physical therapy and provide him a work note saying that he will be out of work until his next follow-up visit.  Patient demonstrated understanding discussion was agreed with the plan.  All his questions were answered.  I will see him back in 4 weeks for repeat evaluation.  He can reach out to clinic with any questions or concerns anytime.    Conservative treatment:  Ice to shoulder 1-2 times daily, for 20 minutes at a time.  Continue PT.  A note was provided for work. Patient will remain out of work at this time with consider of return to full duty at the follow up.    Imaging:  All imaging from today was reviewed by myself and explained to the patient.     Injection:  No Injection planned at this time. May consider if the future pending clinical course.    Surgery:  No surgery is recommended at this point, continue with conservative treatment plan as noted.    Follow up:  Return in about 4 weeks (around 4/3/2024).       Chief Complaint   Patient presents with    Left Shoulder - Follow-up, Pain         History of Present Illness:  The patient is a 53 y.o. RHD male seen in clinic for a 6 week follow up of the left shoulder for pain in the setting of a partial thickness  supraspinatus tear following an injury on 11/28/23 after he experienced a fall onto his left arm. At that patient's last appointment, he was agreeable to continuing PT and no longer required meloxicam as he noted significant improvement with his shoulder pain and ROM.  Since his last appointment, he states he has been going to PT and continues to have decreased pain and increased ROM.  Pain is about a 4/10.  He denies numbness or tingling.     The patient has the following co-morbidities: n/a    Shoulder Surgical History:  None    Past Medical, Social and Family History:  Past Medical History:   Diagnosis Date    Chest pain      History reviewed. No pertinent surgical history.  Allergies   Allergen Reactions    Pollen Extract Itching     Current Outpatient Medications on File Prior to Visit   Medication Sig Dispense Refill    albuterol (ProAir HFA) 90 mcg/act inhaler Inhale 2 puffs every 6 (six) hours as needed for wheezing or shortness of breath 8.5 g 3    fluticasone (FLONASE) 50 mcg/act nasal spray 2 sprays into each nostril daily 16 g 3    tamsulosin (FLOMAX) 0.4 mg Take 1 capsule (0.4 mg total) by mouth daily with dinner 30 capsule 11    terbinafine (LamISIL) 250 mg tablet Take 1 tablet (250 mg total) by mouth daily 42 tablet 0    umeclidinium-vilanterol 62.5-25 mcg/actuation inhaler Inhale 1 puff daily 180 blister 3    EPINEPHrine (EPIPEN) 0.3 mg/0.3 mL SOAJ Inject 0.3 mL (0.3 mg total) into a muscle once for 1 dose 0.6 mL 0    olopatadine (PATANOL) 0.1 % ophthalmic solution Administer 1 drop to both eyes 2 (two) times a day (Patient not taking: Reported on 1/3/2024) 5 mL 3     No current facility-administered medications on file prior to visit.     Social History     Socioeconomic History    Marital status: /Civil Union     Spouse name: Not on file    Number of children: Not on file    Years of education: Not on file    Highest education level: Not on file   Occupational History    Not on file  "  Tobacco Use    Smoking status: Every Day     Current packs/day: 0.50     Average packs/day: 1 pack/day for 39.2 years (38.6 ttl pk-yrs)     Types: Cigarettes     Start date: 1985     Passive exposure: Never    Smokeless tobacco: Never    Tobacco comments:     10 to 12 cigarettes a day he did  quit from [id number]   Vaping Use    Vaping status: Never Used   Substance and Sexual Activity    Alcohol use: Yes     Comment: occasionally    Drug use: No    Sexual activity: Yes   Other Topics Concern    Not on file   Social History Narrative    Not on file     Social Determinants of Health     Financial Resource Strain: Not on file   Food Insecurity: Not on file   Transportation Needs: Not on file   Physical Activity: Not on file   Stress: Not on file   Social Connections: Not on file   Intimate Partner Violence: Not on file   Housing Stability: Not on file       I have reviewed the past medical, surgical, social and family history, medications and allergies as documented in the EMR.    Review of systems: ROS is negative other than that noted in the HPI.  Constitutional: Negative for fatigue and fever.      Physical Exam:    Blood pressure 125/84, pulse 89, height 5' 8\" (1.727 m), weight 121 kg (266 lb).    General/Constitutional: NAD, well developed, well nourished  HENT: Normocephalic, atraumatic  CV: Intact distal pulses, regular rate  Resp: No respiratory distress or labored breathing  GI: Soft and non-tender   Lymphatic: No lymphadenopathy palpated  Neuro: Alert and Oriented x 3, no focal deficits  Psych: Normal mood, normal affect, normal judgement, normal behavior  Skin: Warm, dry, no rashes, no erythema      Focused Left shoulder exam:  No paracervical tenderness.   No cervical tenderness.   No pain with neck flexion, extension, side-to-side bending, or rotation.     The skin is intact without evidence of erythema or ecchymosis. Symmetric shoulders with no evidence of supraspinatus or infraspinatus muscle atrophy. " There is no evidence neurologic medial or lateral scapular winging. Normal scapular positioning.    Palpation demonstrates no tenderness over the AC joint, lateral border of the acromion, SC joint, bilateral clavicle, or bicipital groove.    Shoulder ROM demonstrates 170 degrees of active forward elevation. External rotation with the arms at the side demonstrates 70 degree of passive motion.  Internal rotation is to waistband.  Abduction to about 40 degrees    Unable to perform empty can position for strength testing. Strength testing demonstrates 4/5 with empty can, 5/5 with resisted external rotation with the arm at the side.  5/5 strength of the subscapularis and a negative belly press.      Provocative testing unable to perform secondary to pain and limted ROM.    UE NV Exam: +2 Radial pulses bilaterally. Fingers are warm and well-perfused.  Sensation intact to light touch C5-T1 bilaterally, Radial/median/ulnar nerve motor intact      Shoulder Imaging    Radiographs of the left shoulder were obtained on 11/28 and 11/29 and reviewed with the patient.  Based on my independent evaluation, the imaging shows mild AC joint osteoarthritis but no acute fracture or dislocation.    MRI of the left shoulder from 6/20/2023 was reviewed with the patient.  Based on my independent evaluation, the MRI shows tendinopathy and a partial-thickness bursal sided tear of the supraspinatus.  No significant tendinopathy or tears of the subscapularis, infraspinatus, or teres minor.  No rotator cuff muscle atrophy noted.      Scribe Attestation      I,:   am acting as a scribe while in the presence of the attending physician.:       I,:   personally performed the services described in this documentation    as scribed in my presence.:

## 2024-03-07 ENCOUNTER — OFFICE VISIT (OUTPATIENT)
Dept: OCCUPATIONAL THERAPY | Facility: CLINIC | Age: 54
End: 2024-03-07
Payer: COMMERCIAL

## 2024-03-07 DIAGNOSIS — S46.012D TRAUMATIC INCOMPLETE TEAR OF LEFT ROTATOR CUFF, SUBSEQUENT ENCOUNTER: Primary | ICD-10-CM

## 2024-03-07 PROCEDURE — 97110 THERAPEUTIC EXERCISES: CPT

## 2024-03-07 PROCEDURE — 97530 THERAPEUTIC ACTIVITIES: CPT

## 2024-03-11 ENCOUNTER — OFFICE VISIT (OUTPATIENT)
Dept: OCCUPATIONAL THERAPY | Facility: CLINIC | Age: 54
End: 2024-03-11
Payer: COMMERCIAL

## 2024-03-11 DIAGNOSIS — S46.012D TRAUMATIC INCOMPLETE TEAR OF LEFT ROTATOR CUFF, SUBSEQUENT ENCOUNTER: Primary | ICD-10-CM

## 2024-03-11 PROCEDURE — 97530 THERAPEUTIC ACTIVITIES: CPT

## 2024-03-11 PROCEDURE — 97110 THERAPEUTIC EXERCISES: CPT

## 2024-03-11 NOTE — PROGRESS NOTES
Daily Note     Today's date: 3/11/2024  Patient name: Christiano Monroe  : 1970  MRN: [id number]  Referring provider: Lawrence Gabriel MD  Dx:   Encounter Diagnosis     ICD-10-CM    1. Traumatic incomplete tear of left rotator cuff, subsequent encounter  S46.012D                      Subjective: I did a little drumming the other day. My shoulder felt pretty good.      Objective: See treatment diary below      Assessment: Tolerated treatment well. Patient demonstrated fatigue post treatment, exhibited good technique with therapeutic exercises, and would benefit from continued OT. Denied pain pre and post tx. Pt reports his shoulder is feeling much better.      Plan: Continue per plan of care.  Progress treatment as tolerated.       Precautions: Left Shoulder Pain  Initial Evaluation completed on: 2023  Re-Evaluation needs to be completed before: 2024  Insurance: [employer]  FOTO: 2024  Auth Visits: N/A       Manuals 3/4/2024 3/7/2024 3/11/2024     PROM Shoulder Supine All Planes                                Neuro Re-Ed  3/4/2024 3/7/2024 3/11/2024                                                             Ther Ex 3/4/2024 3/7/2024 3/11/2024     Body Blade  Elbow Bent  FF  Scaption  ABD     1 min x 3  1 min x 3     1 min x 3  1 min x 3     1 min x 3  1 min x 3     Pulleys  FF  ABD   X 20  X 20   X 20  X 20   X 20  X 20     Thera-Ball wall stretch 10 sec  x 10 10 sec x 10 10 sec x 10     Shoulder  Shrugs  Rolls  Retraction  Biceps Curl  Lateral Raises        Pulse Yellow TB  30 sec x 2 Yellow TB  30 sec x 2 Yellow TB  30 sec x 2     ER theraband 5 sec x 20 5 sec x 20 5 sec x 20     Wall ER walk Yellow x 10 Yellow x 10 Yellow x 10     UBE Machine 5F/5B min L 50 5F/5B min L 50 5F/5B min L 50     TB  Sh Ext  Triceps Ext  ADD  Retraction  IR  ER  Biceps Curl  Abd  FF  CP Black  X 20  X 20  X 20  X 20  X 20  X 20  X 20  X 20 red  X 20 red  X 20 Black  X 20  X 20  X 20  X 20  X 20  X 20  X  20  X 20 red  X 20 red  X 20 Black  X 20  X 20  X 20  X 20  X 20  X 20  X 20  X 20 red  X 20 red  X 20      Doorway Stretch Cross body stretch  30 sec x 3 Cross body stretch  30 secx 3 Cross body stretch  30 sec x 3     Sleeper Stretch 20 sec x 3 20 sec x 3      Wall ER TB Yellow x 10 Yellow x 10 Yellow x 10             Ther Activity 3/4/2024 3/7/2024 3/11/2024     Functional reaching Removed items from each shelf w/LUE w/1# CW Removed items from each shelf  W/LUE w/1# CW Removed items from each shelf w/LUE w/1# CW                                     Modalities 3/4/2024 3/7/2024 3/11/2024     Ultrasound                CP        E-STIM Shoulder

## 2024-03-13 ENCOUNTER — CONSULT (OUTPATIENT)
Age: 54
End: 2024-03-13
Payer: COMMERCIAL

## 2024-03-13 VITALS
SYSTOLIC BLOOD PRESSURE: 120 MMHG | OXYGEN SATURATION: 97 % | DIASTOLIC BLOOD PRESSURE: 80 MMHG | TEMPERATURE: 98.1 F | BODY MASS INDEX: 39.86 KG/M2 | HEART RATE: 62 BPM | RESPIRATION RATE: 17 BRPM | WEIGHT: 263 LBS | HEIGHT: 68 IN

## 2024-03-13 DIAGNOSIS — Z12.11 SCREENING FOR COLON CANCER: Primary | ICD-10-CM

## 2024-03-13 DIAGNOSIS — J41.0 SIMPLE CHRONIC BRONCHITIS (HCC): ICD-10-CM

## 2024-03-13 PROCEDURE — 99243 OFF/OP CNSLTJ NEW/EST LOW 30: CPT | Performed by: STUDENT IN AN ORGANIZED HEALTH CARE EDUCATION/TRAINING PROGRAM

## 2024-03-13 RX ORDER — SODIUM, POTASSIUM,MAG SULFATES 17.5-3.13G
177 SOLUTION, RECONSTITUTED, ORAL ORAL ONCE
Qty: 354 ML | Refills: 0 | Status: SHIPPED | OUTPATIENT
Start: 2024-03-13 | End: 2024-03-13

## 2024-03-13 NOTE — PROGRESS NOTES
[facility]  Outpatient Consultation  Encounter: [id number]     PATIENT INFO     Name: Christiano Monroe  YOB: 1970   Age: 53 y.o.   Sex: male   MRN: [id number]    ASSESSMENT & PLAN     Christiano Monroe is a 53 y.o. male with need for colon cancer screening.    Problem List Items Addressed This Visit       Simple chronic bronchitis (HCC)     Respiratory status appears to be stable.  Currently on Anoro as well as albuterol inhaler.  Currently on room air.    Continue inhalers as directed by PCP and pulmonologist  Safe to proceed with colonoscopy with anesthesia         Screening for colon cancer - Primary     Overdue for colon cancer screening.  Denies any family history of colon cancer.  Denies any symptoms.  Not on antithrombotics or anticoagulants.  No prior colon cancer screening.  Average risk for colorectal cancer.    Discussed risks and benefits of screening colonoscopy and reviewed other CRC screening modalities including stool based tests  Patient is agreeable to proceed with screening colonoscopy  We will schedule colonoscopy  Suprep bowel prep         Relevant Medications    Na Sulfate-K Sulfate-Mg Sulf (Suprep Bowel Prep Kit) 17.5-3.13-1.6 GM/177ML SOLN    Other Relevant Orders    Colonoscopy     Orders Placed This Encounter   Procedures    Colonoscopy       FOLLOW-UP: As needed    HISTORY OF PRESENT ILLNESS       Christiano Monroe is a 53 y.o. male who presents to GI office for for colon cancer screening.  Patient is new to this office.  Patient has been referred by his PCP.    Patient is due for colon cancer screening.  Denies any prior colon cancer screening.  Denies any family history of colon cancer.  He denies any alarm symptoms at this time including abdominal pain, change in bowel habits, constipation, diarrhea, hematochezia, melena, unintentional weight loss.  He denies any upper GI symptoms.  He is not on any antithrombotics or  anticoagulants.     ENDOSCOPIC HISTORY     UPPER ENDOSCOPY: None    COLONOSCOPY: None    REVIEW OF SYSTEMS     CONSTITUTIONAL: Denies any fever, chills, rigors, and weight loss  HEENT: No earache or tinnitus, denies hearing loss or visual disturbances  CARDIOVASCULAR: No chest pain or palpitations  RESPIRATORY: Denies any cough, hemoptysis, shortness of breath or dyspnea on exertion  GASTROINTESTINAL: As noted in the History of Present Illness  GENITOURINARY: No problems with urination, denies any hematuria or dysuria  NEUROLOGIC: No dizziness or vertigo, denies headaches   MUSCULOSKELETAL: Denies any muscle or joint pain   SKIN: Denies jaundice or itching  PSYCHOSOCIAL: Denies depression or anxiety, denies any recent memory loss     Historical Information   Past Medical History:   Diagnosis Date    Chest pain      History reviewed. No pertinent surgical history.  Social History   Social History     Substance and Sexual Activity   Alcohol Use Yes    Comment: occasionally     Social History     Substance and Sexual Activity   Drug Use No     Social History     Tobacco Use   Smoking Status Every Day    Current packs/day: 0.50    Average packs/day: 1 pack/day for 39.2 years (38.6 ttl pk-yrs)    Types: Cigarettes    Start date: 1985    Passive exposure: Never   Smokeless Tobacco Never   Tobacco Comments    10 to 12 cigarettes a day he did  quit from [id number]     Family History   Problem Relation Age of Onset    Cancer Mother     Cancer Father     Heart disease Maternal Grandfather     Heart disease Paternal Grandfather     No Known Problems Daughter        MEDICATIONS & ALLERGIES     Current Outpatient Medications   Medication Instructions    albuterol (ProAir HFA) 90 mcg/act inhaler 2 puffs, Inhalation, Every 6 hours PRN    EPINEPHrine (EPIPEN) 0.3 mg, Intramuscular, Once    fluticasone (FLONASE) 50 mcg/act nasal spray 2 sprays, Nasal, Daily    Na Sulfate-K Sulfate-Mg Sulf (Suprep Bowel Prep Kit) 17.5-3.13-1.6  "GM/177ML SOLN 177 mL, Oral, Once, Take 177 mL by mouth once for 1 dose    olopatadine (PATANOL) 0.1 % ophthalmic solution 1 drop, Both Eyes, 2 times daily    tamsulosin (FLOMAX) 0.4 mg, Oral, Daily with dinner    terbinafine (LAMISIL) 250 mg, Oral, Daily    umeclidinium-vilanterol 62.5-25 mcg/actuation inhaler 1 puff, Inhalation, Daily     Allergies   Allergen Reactions    Pollen Extract Itching       PHYSICAL EXAM      Objective   Blood pressure 120/80, pulse 62, temperature 98.1 °F (36.7 °C), resp. rate 17, height 5' 8\" (1.727 m), weight 119 kg (263 lb), SpO2 97%. Body mass index is 39.99 kg/m².    General Appearance:   Alert, cooperative, no distress   HEENT:   Normocephalic, atraumatic, anicteric     Neck:   Supple, symmetrical, trachea midline   Lungs:   Equal chest rise, respirations unlabored    Heart:   Regular rate   Abdomen:   Soft, non-tender, non-distended; normal bowel sounds; no masses, no organomegaly    Rectal:   Deferred    Extremities:   No cyanosis or edema    Neuro:   Moves all 4 extremities    Skin:   No jaundice, rashes, or lesions       LABORATORY RESULTS     No visits with results within 1 Day(s) from this visit.   Latest known visit with results is:   Lab on 09/30/2023   Component Date Value    Hemoglobin A1C 09/30/2023 6.2 (H)     EAG 09/30/2023 131         IMAGING RESULTS     No results found.  I have personally reviewed any available and pertinent imaging study reports.      Yonatan Priest D.O.  [facility]  Division of Gastroenterology & Hepatology  Available on [employer]  [email or web address]    ** Please Note: This note is constructed using a voice recognition dictation system. **  "

## 2024-03-13 NOTE — ASSESSMENT & PLAN NOTE
Respiratory status appears to be stable.  Currently on Anoro as well as albuterol inhaler.  Currently on room air.    Continue inhalers as directed by PCP and pulmonologist  Safe to proceed with colonoscopy with anesthesia

## 2024-03-13 NOTE — PROGRESS NOTES
Daily Note     Today's date: 3/13/2024  Patient name: Christiano Monroe  : 1970  MRN: [id number]  Referring provider: Lawrence Gabriel MD  Dx:   Encounter Diagnosis     ICD-10-CM    1. Traumatic incomplete tear of left rotator cuff, subsequent encounter  S46.012D                      Subjective: The doctor said to finish out what therapy I have scheduled.      Objective: See treatment diary below      Assessment: Tolerated treatment well. Patient demonstrated fatigue post treatment, exhibited good technique with therapeutic exercises, and would benefit from continued OT.      Plan: Continue per plan of care.  Progress treatment as tolerated.       Precautions: Left Shoulder Pain  Initial Evaluation completed on: 2023  Re-Evaluation needs to be completed before: 2024  Insurance: [employer]  FOTO: 2024  Auth Visits: N/A       Manuals 3/4/2024 3/7/2024 3/11/2024 3/14/2024    PROM Shoulder Supine All Planes                                Neuro Re-Ed  3/4/2024 3/7/2024 3/11/2024 3/14/2024                                                            Ther Ex 3/4/2024 3/7/2024 3/11/2024 3/14/2024    Body Blade  Elbow Bent  FF  Scaption  ABD     1 min x 3  1 min x 3     1 min x 3  1 min x 3     1 min x 3  1 min x 3     1 min x 3  1 min x 3    Pulleys  FF  ABD   X 20  X 20   X 20  X 20   X 20  X 20   X 20  X 20    Thera-Ball wall stretch 10 sec  x 10 10 sec x 10 10 sec x 10 10 sec x 10    Shoulder  Shrugs  Rolls  Retraction  Biceps Curl  Lateral Raises        Pulse Yellow TB  30 sec x 2 Yellow TB  30 sec x 2 Yellow TB  30 sec x 2 Yellow TB  30 sec x 3    ER theraband 5 sec x 20 5 sec x 20 5 sec x 20 5 sec x 20    Wall ER walk Yellow x 10 Yellow x 10 Yellow x 10 Yellow x 10    UBE Machine 5F/5B min L 50 5F/5B min L 50 5F/5B min L 50 5F/5B min L 50    TB  Sh Ext  Triceps Ext  ADD  Retraction  IR  ER  Biceps Curl  Abd  FF  CP Black  X 20  X 20  X 20  X 20  X 20  X 20  X 20  X 20 red  X 20 red  X 20  Black  X 20  X 20  X 20  X 20  X 20  X 20  X 20  X 20 red  X 20 red  X 20 Black  X 20  X 20  X 20  X 20  X 20  X 20  X 20  X 20 red  X 20 red  X 20  Black  X 20  X 20  X 20  X 20  X 20  X 20  X 20  X 20 red  X 20 red  X 20    Doorway Stretch Cross body stretch  30 sec x 3 Cross body stretch  30 secx 3 Cross body stretch  30 sec x 3 Crossbody stretch  30 sec x 3    Sleeper Stretch 20 sec x 3 20 sec x 3      Wall ER TB Yellow x 10 Yellow x 10 Yellow x 10 Yellow x 10            Ther Activity 3/4/2024 3/7/2024 3/11/2024 3/14/2024    Functional reaching Removed items from each shelf w/LUE w/1# CW Removed items from each shelf  W/LUE w/1# CW Removed items from each shelf w/LUE w/1# CW Removed items from each shelf w/1# CW on LUE                                    Modalities 3/4/2024 3/7/2024 3/11/2024 3/14/2024    Ultrasound        MH        CP        E-STIM Shoulder

## 2024-03-13 NOTE — ASSESSMENT & PLAN NOTE
Overdue for colon cancer screening.  Denies any family history of colon cancer.  Denies any symptoms.  Not on antithrombotics or anticoagulants.  No prior colon cancer screening.  Average risk for colorectal cancer.    Discussed risks and benefits of screening colonoscopy and reviewed other CRC screening modalities including stool based tests  Patient is agreeable to proceed with screening colonoscopy  We will schedule colonoscopy  Suprep bowel prep

## 2024-03-14 ENCOUNTER — OFFICE VISIT (OUTPATIENT)
Dept: OCCUPATIONAL THERAPY | Facility: CLINIC | Age: 54
End: 2024-03-14
Payer: COMMERCIAL

## 2024-03-14 DIAGNOSIS — S46.012D TRAUMATIC INCOMPLETE TEAR OF LEFT ROTATOR CUFF, SUBSEQUENT ENCOUNTER: Primary | ICD-10-CM

## 2024-03-14 PROCEDURE — 97530 THERAPEUTIC ACTIVITIES: CPT

## 2024-03-14 PROCEDURE — 97110 THERAPEUTIC EXERCISES: CPT

## 2024-03-18 ENCOUNTER — OFFICE VISIT (OUTPATIENT)
Dept: OCCUPATIONAL THERAPY | Facility: CLINIC | Age: 54
End: 2024-03-18
Payer: COMMERCIAL

## 2024-03-18 DIAGNOSIS — S46.012D TRAUMATIC INCOMPLETE TEAR OF LEFT ROTATOR CUFF, SUBSEQUENT ENCOUNTER: Primary | ICD-10-CM

## 2024-03-18 PROCEDURE — 97530 THERAPEUTIC ACTIVITIES: CPT

## 2024-03-18 PROCEDURE — 97110 THERAPEUTIC EXERCISES: CPT

## 2024-03-18 NOTE — PROGRESS NOTES
3/18/2024Daily Note     Today's date: 3/18/2024  Patient name: Christiano oMnroe  : 1970  MRN: [id number]  Referring provider: Lawrence Gabriel MD  Dx:   Encounter Diagnosis     ICD-10-CM    1. Traumatic incomplete tear of left rotator cuff, subsequent encounter  S46.012D                      Subjective: My shoulder is feeling really good. I can't wait to get back to work.      Objective: See treatment diary below      Assessment: Tolerated treatment well. Patient exhibited good technique with therapeutic exercises. Provided pt with red theraband for HEP. Pt tolerated additional resistance well. Denied pain and demonstrates full A/PROM.       Plan: D/C OT tx today.     Precautions: Left Shoulder Pain  Initial Evaluation completed on: 2023  Re-Evaluation needs to be completed before: 2024  Insurance: [employer]  FOTO: 2024  Auth Visits: N/A       Manuals 3/4/2024 3/7/2024 3/11/2024 3/14/2024 3/18/2024   PROM Shoulder Supine All Planes                                Neuro Re-Ed  3/4/2024 3/7/2024 3/11/2024 3/14/2024 3/18/2024                                                           Ther Ex 3/4/2024 3/7/2024 3/11/2024 3/14/2024 3/18/2024   Body Blade  Elbow Bent  FF  Scaption  ABD     1 min x 3  1 min x 3     1 min x 3  1 min x 3     1 min x 3  1 min x 3     1 min x 3  1 min x 3     1 min x 3  1 min x 3   Pulleys  FF  ABD   X 20  X 20   X 20  X 20   X 20  X 20   X 20  X 20   X 20  X 20   Thera-Ball wall stretch 10 sec  x 10 10 sec x 10 10 sec x 10 10 sec x 10 10 sec x 10   Shoulder  Shrugs  Rolls  Retraction  Biceps Curl  Lateral Raises        Pulse Yellow TB  30 sec x 2 Yellow TB  30 sec x 2 Yellow TB  30 sec x 2 Yellow TB  30 sec x 3 Red TB  30 sec x 3   ER theraband 5 sec x 20 5 sec x 20 5 sec x 20 5 sec x 20 5 sec x 20 Red   Wall ER walk Yellow x 10 Yellow x 10 Yellow x 10 Yellow x 10 Red x 10   UBE Machine 5F/5B min L 50 5F/5B min L 50 5F/5B min L 50 5F/5B min L 50 5F/5B min L 50    TB  Sh Ext  Triceps Ext  ADD  Retraction  IR  ER  Biceps Curl  Abd  FF  CP Black  X 20  X 20  X 20  X 20  X 20  X 20  X 20  X 20 red  X 20 red  X 20 Black  X 20  X 20  X 20  X 20  X 20  X 20  X 20  X 20 red  X 20 red  X 20 Black  X 20  X 20  X 20  X 20  X 20  X 20  X 20  X 20 red  X 20 red  X 20  Black  X 20  X 20  X 20  X 20  X 20  X 20  X 20  X 20 red  X 20 red  X 20 Black  X 20  X 20  X 20  X 20  X 20  X 20  X 20  X 20 red  X 20 red  X 20   Doorway Stretch Cross body stretch  30 sec x 3 Cross body stretch  30 secx 3 Cross body stretch  30 sec x 3 Crossbody stretch  30 sec x 3 Crossbody stretch  30 sec x 3   Sleeper Stretch 20 sec x 3 20 sec x 3   20 sec x 3   Wall ER TB Yellow x 10 Yellow x 10 Yellow x 10 Yellow x 10 Red x 10           Ther Activity 3/4/2024 3/7/2024 3/11/2024 3/14/2024 3/18/2024   Functional reaching Removed items from each shelf w/LUE w/1# CW Removed items from each shelf  W/LUE w/1# CW Removed items from each shelf w/LUE w/1# CW Removed items from each shelf w/1# CW on LUE Removed items from each shelf w/1# CW on LUE                                   Modalities 3/4/2024 3/7/2024 3/11/2024 3/14/2024 3/18/2024   Ultrasound        MH        CP        E-STIM Shoulder

## 2024-03-21 ENCOUNTER — APPOINTMENT (OUTPATIENT)
Dept: OCCUPATIONAL THERAPY | Facility: CLINIC | Age: 54
End: 2024-03-21
Payer: COMMERCIAL

## 2024-03-22 ENCOUNTER — APPOINTMENT (OUTPATIENT)
Dept: OCCUPATIONAL THERAPY | Facility: CLINIC | Age: 54
End: 2024-03-22
Payer: COMMERCIAL

## 2024-03-22 DIAGNOSIS — J41.0 SIMPLE CHRONIC BRONCHITIS (HCC): ICD-10-CM

## 2024-03-22 DIAGNOSIS — R35.0 BENIGN PROSTATIC HYPERPLASIA WITH URINARY FREQUENCY: ICD-10-CM

## 2024-03-22 DIAGNOSIS — N40.1 BENIGN PROSTATIC HYPERPLASIA WITH URINARY FREQUENCY: ICD-10-CM

## 2024-03-22 RX ORDER — TAMSULOSIN HYDROCHLORIDE 0.4 MG/1
0.4 CAPSULE ORAL
Qty: 30 CAPSULE | Refills: 0 | Status: SHIPPED | OUTPATIENT
Start: 2024-03-22

## 2024-04-03 ENCOUNTER — OFFICE VISIT (OUTPATIENT)
Dept: OBGYN CLINIC | Facility: CLINIC | Age: 54
End: 2024-04-03
Payer: COMMERCIAL

## 2024-04-03 VITALS
BODY MASS INDEX: 39.71 KG/M2 | SYSTOLIC BLOOD PRESSURE: 130 MMHG | HEART RATE: 70 BPM | HEIGHT: 68 IN | DIASTOLIC BLOOD PRESSURE: 86 MMHG | WEIGHT: 262 LBS

## 2024-04-03 DIAGNOSIS — S46.012A TRAUMATIC INCOMPLETE TEAR OF LEFT ROTATOR CUFF, INITIAL ENCOUNTER: Primary | ICD-10-CM

## 2024-04-03 PROCEDURE — 99213 OFFICE O/P EST LOW 20 MIN: CPT | Performed by: STUDENT IN AN ORGANIZED HEALTH CARE EDUCATION/TRAINING PROGRAM

## 2024-04-03 NOTE — LETTER
April 3, 2024     Patient: Christiano Monroe  YOB: 1970  Date of Visit: 4/3/2024      To Whom it May Concern:    Christiano Monroe is under my professional care. Christiano Espino was seen in my office on 4/3/2024. Christiano Espino may return to work on 4/8/24 without restrictions .    If you have any questions or concerns, please don't hesitate to call.         Sincerely,          Lawrence Gabriel MD        CC: No Recipients

## 2024-04-03 NOTE — PROGRESS NOTES
Ortho Sports Medicine Shoulder New Patient Visit     Assesment:   53 y.o. male with resolved left shoulder pain, weakness and limited ROM after a fall 14 weeks ago (11/28/24) with MRI showing a partial thickness supraspinatus tear and a grade 2 strain of the anterolateral deltoid muscle.    Plan:  I reviewed the history and exam with the patient in clinic today.  Patient states that he is completed physical therapy has been doing home exercises.  He states that he is having no pain in the shoulder and is interested in returning to work.  He states that he has no limitations in terms of function.  I provided the patient with a work note clearing him to return to work on 4/8/2024.  I discussed that he can follow-up on an as-needed basis at this point.  Patient demonstrated understanding of our discussion was agreed with the plan.  All his questions were answered.  He can reach out to clinic with any questions or concerns anytime.    Conservative treatment:  Continue HEP.  A note was provided for work. Patient will return to work 4/8/24 .    Imaging:  All imaging from today was reviewed by myself and explained to the patient.     Injection:  No Injection planned at this time. May consider if the future pending clinical course.    Surgery:  No surgery is recommended at this point, continue with conservative treatment plan as noted.    Follow up:  Return if symptoms worsen or fail to improve.      Chief Complaint   Patient presents with    Left Shoulder - Follow-up         History of Present Illness:  The patient is a 53 y.o. RHD male seen in clinic for a 6 week follow up of the left shoulder for pain in the setting of a partial thickness supraspinatus tear following an injury on 11/28/23 after he experienced a fall onto his left arm. At that patient's last appointment, he was agreeable to continuing PT and stated he was doing well enough that he did not need any meloxicam.  He states that he is back to normal. He denies  any residual pain or weakness. Pain is about a 0/10.  He states he is ready to go back to work.  He states he just put a new floor in his kitchen without any issues.    The patient has the following co-morbidities: n/a    Shoulder Surgical History:  None    Past Medical, Social and Family History:  Past Medical History:   Diagnosis Date    Chest pain      History reviewed. No pertinent surgical history.  Allergies   Allergen Reactions    Pollen Extract Itching     Current Outpatient Medications on File Prior to Visit   Medication Sig Dispense Refill    albuterol (ProAir HFA) 90 mcg/act inhaler Inhale 2 puffs every 6 (six) hours as needed for wheezing or shortness of breath 8.5 g 3    fluticasone (FLONASE) 50 mcg/act nasal spray 2 sprays into each nostril daily 16 g 3    tamsulosin (FLOMAX) 0.4 mg Take 1 capsule (0.4 mg total) by mouth daily with dinner 30 capsule 0    terbinafine (LamISIL) 250 mg tablet Take 1 tablet (250 mg total) by mouth daily 42 tablet 0    umeclidinium-vilanterol 62.5-25 mcg/actuation inhaler Inhale 1 puff daily 180 blister 3    EPINEPHrine (EPIPEN) 0.3 mg/0.3 mL SOAJ Inject 0.3 mL (0.3 mg total) into a muscle once for 1 dose 0.6 mL 0    Na Sulfate-K Sulfate-Mg Sulf (Suprep Bowel Prep Kit) 17.5-3.13-1.6 GM/177ML SOLN Take 177 mL by mouth once for 1 dose Take 177 mL by mouth once for 1 dose 354 mL 0    olopatadine (PATANOL) 0.1 % ophthalmic solution Administer 1 drop to both eyes 2 (two) times a day (Patient not taking: Reported on 1/3/2024) 5 mL 3     No current facility-administered medications on file prior to visit.     Social History     Socioeconomic History    Marital status: /Civil Union     Spouse name: Not on file    Number of children: Not on file    Years of education: Not on file    Highest education level: Not on file   Occupational History    Not on file   Tobacco Use    Smoking status: Every Day     Current packs/day: 0.50     Average packs/day: 1 pack/day for 39.3 years  "(38.6 ttl pk-yrs)     Types: Cigarettes     Start date: 1985     Passive exposure: Never    Smokeless tobacco: Never    Tobacco comments:     10 to 12 cigarettes a day he did  quit from [id number]   Vaping Use    Vaping status: Never Used   Substance and Sexual Activity    Alcohol use: Yes     Comment: occasionally    Drug use: No    Sexual activity: Yes   Other Topics Concern    Not on file   Social History Narrative    Not on file     Social Determinants of Health     Financial Resource Strain: Not on file   Food Insecurity: Not on file   Transportation Needs: Not on file   Physical Activity: Not on file   Stress: Not on file   Social Connections: Not on file   Intimate Partner Violence: Not on file   Housing Stability: Not on file       I have reviewed the past medical, surgical, social and family history, medications and allergies as documented in the EMR.    Review of systems: ROS is negative other than that noted in the HPI.  Constitutional: Negative for fatigue and fever.      Physical Exam:    Blood pressure 130/86, pulse 70, height 5' 8\" (1.727 m), weight 119 kg (262 lb).    General/Constitutional: NAD, well developed, well nourished  HENT: Normocephalic, atraumatic  CV: Intact distal pulses, regular rate  Resp: No respiratory distress or labored breathing  GI: Soft and non-tender   Lymphatic: No lymphadenopathy palpated  Neuro: Alert and Oriented x 3, no focal deficits  Psych: Normal mood, normal affect, normal judgement, normal behavior  Skin: Warm, dry, no rashes, no erythema      Focused Left shoulder exam:  No paracervical tenderness.   No cervical tenderness.   No pain with neck flexion, extension, side-to-side bending, or rotation.     The skin is intact without evidence of erythema or ecchymosis. Symmetric shoulders with no evidence of supraspinatus or infraspinatus muscle atrophy. There is no evidence neurologic medial or lateral scapular winging. Normal scapular positioning.    Palpation " demonstrates no tenderness over the AC joint, lateral border of the acromion, SC joint, bilateral clavicle, or bicipital groove.    Shoulder ROM demonstrates 170 degrees of active forward elevation. External rotation with the arms at the side demonstrates 70 degree of passive motion.  Internal rotation is to waistband.  Abduction to about 40 degrees    Unable to perform empty can position for strength testing. Strength testing demonstrates 5_/5 with empty can, 5/5 with resisted external rotation with the arm at the side.  5/5 strength of the subscapularis and a negative belly press.      Provocative testing unable to perform secondary to pain and limted ROM.    UE NV Exam: +2 Radial pulses bilaterally. Fingers are warm and well-perfused.  Sensation intact to light touch C5-T1 bilaterally, Radial/median/ulnar nerve motor intact      Shoulder Imaging    Radiographs of the left shoulder were obtained on 11/28 and 11/29 and reviewed with the patient.  Based on my independent evaluation, the imaging shows mild AC joint osteoarthritis but no acute fracture or dislocation.    MRI of the left shoulder from 6/20/2023 was reviewed with the patient.  Based on my independent evaluation, the MRI shows tendinopathy and a partial-thickness bursal sided tear of the supraspinatus.  No significant tendinopathy or tears of the subscapularis, infraspinatus, or teres minor.  No rotator cuff muscle atrophy noted.      Scribe Attestation      I,:  Ferny Arreaga PA-C am acting as a scribe while in the presence of the attending physician.:       I,:  Lawrence Gabriel MD personally performed the services described in this documentation    as scribed in my presence.:

## 2024-04-09 ENCOUNTER — CONSULT (OUTPATIENT)
Dept: BARIATRICS | Facility: CLINIC | Age: 54
End: 2024-04-09
Payer: COMMERCIAL

## 2024-04-09 VITALS
DIASTOLIC BLOOD PRESSURE: 78 MMHG | BODY MASS INDEX: 39.43 KG/M2 | RESPIRATION RATE: 20 BRPM | HEART RATE: 96 BPM | SYSTOLIC BLOOD PRESSURE: 126 MMHG | WEIGHT: 260.2 LBS | HEIGHT: 68 IN | TEMPERATURE: 98.5 F | OXYGEN SATURATION: 98 %

## 2024-04-09 DIAGNOSIS — F17.210 CIGARETTE NICOTINE DEPENDENCE WITHOUT COMPLICATION: ICD-10-CM

## 2024-04-09 DIAGNOSIS — E66.01 SEVERE OBESITY (BMI 35.0-35.9 WITH COMORBIDITY) (HCC): Primary | ICD-10-CM

## 2024-04-09 DIAGNOSIS — G47.33 OSA (OBSTRUCTIVE SLEEP APNEA): ICD-10-CM

## 2024-04-09 DIAGNOSIS — R73.03 PREDIABETES: ICD-10-CM

## 2024-04-09 PROCEDURE — 99244 OFF/OP CNSLTJ NEW/EST MOD 40: CPT | Performed by: PHYSICIAN ASSISTANT

## 2024-04-09 RX ORDER — SILDENAFIL 100 MG/1
TABLET, FILM COATED ORAL
COMMUNITY
Start: 2024-03-22

## 2024-04-09 NOTE — ASSESSMENT & PLAN NOTE
-Discussed options of [facility]-Intensive Lifestyle Intervention Program, Very Low Calorie Diet-VLCD, Conservative Program, Richard-En-Y Gastric Bypass, and Vertical Sleeve Gastrectomy and the role of weight loss medications.  -Hx Bipolar disorder, avoid Phentermine/Qysmia  -Initial weight loss goal of 5-10% weight loss for improved health  -Screening labs: reviewed CMP, Lipid panel, TSH, Hgba1c  -Patient is interested in pursuing  conservative program  -Calorie goals, sample menu, portion size guidelines, and food logging reviewed with the patient.

## 2024-04-09 NOTE — PROGRESS NOTES
Assessment/Plan:    Severe obesity (BMI 35.0-35.9 with comorbidity) ([facility])  -Discussed options of HealthyCORE-Intensive Lifestyle Intervention Program, Very Low Calorie Diet-VLCD, Conservative Program, Richard-En-Y Gastric Bypass, and Vertical Sleeve Gastrectomy and the role of weight loss medications.  -Hx Bipolar disorder, avoid Phentermine/Qysmia  -Initial weight loss goal of 5-10% weight loss for improved health  -Screening labs: reviewed CMP, Lipid panel, TSH, Hgba1c  -Patient is interested in pursuing  conservative program  -Calorie goals, sample menu, portion size guidelines, and food logging reviewed with the patient.      PAT (obstructive sleep apnea)  -compliant with CPAP  -can improve with weight loss    Cigarette nicotine dependence without complication  -counseled on cessation    Prediabetes  HgbA1c 6.2  -avoid/limit refined carbohydrates  -can consider Metformin/GLP-1    Goals:    Food log (ie.) www.myfitnesspal.com,sparkpeople.com,loseit.com,calorieking.com,etc.   No sugary beverages. At least 64oz of water daily.  Increase physical activity by 10 minutes daily. Gradually increase physical activity to a goal of 5 days per week for 30 minutes of MODERATE intensity PLUS 2 days per week of FULL BODY resistance training  4935-4265 calories per day  5-10 servings of fruits and vegetables per day  25-35 grams of dietary fiber per day      Follow up in approximately 3 months with Non-Surgical Physician/Advanced Practitioner.    Diagnoses and all orders for this visit:    Severe obesity (BMI 35.0-35.9 with comorbidity) ([facility])    BMI 39.0-39.9,adult  -     Ambulatory Referral to Weight Management    PAT (obstructive sleep apnea)    Cigarette nicotine dependence without complication    Prediabetes    Other orders  -     sildenafil (VIAGRA) 100 mg tablet          Subjective:   Chief Complaint   Patient presents with    Consult       Patient ID: Christiano Monroe  is a 53 y.o. male with excess weight/obesity here  to pursue weight managment.    Past Medical History:   Diagnosis Date    Chest pain          HPI:  Obesity/Excess Weight:  Severity: Severe  Onset:  past 10 years, reports gaining 90 lbs  Modifiers:  self created diets, no longer deep frying foods and grilling meats instead - reports lost 30 lbs but regained  Contributing factors: Stress/Emotional Eating and Medications ( Lithium and Prozac)  Associated symptoms: increased joint pain and increased shortness of breath, fatigue    Goals: 200 lbs  Highest: current    Hydration: water none, diet green tea  ETOH: 2-3 beers per month  Exercise: denies  Occupation:  - will be starting to work second shift 3pm-1am  Sleep: 6-8 hours, wearing CPAP machine  Smoking: cigarettes - 10 per day    B: rice krispies + banana and strawberry + whole milk  S: skips  L: deli meat hoagie 6-8 inch + chips   S:  skips  D: Ham and mashed potato + green beans or Hot dog + mac and cheese OR pork chops+pasta  S:  chips or cookies Or apple      Colonoscopy: never completed; scheduled Thursday    The following portions of the patient's history were reviewed and updated as appropriate: allergies, current medications, past family history, past medical history, past social history, past surgical history, and problem list.    Review of Systems   Constitutional:  Negative for chills and fever.   HENT:  Negative for sore throat.    Respiratory:  Positive for cough. Negative for shortness of breath.    Cardiovascular:  Negative for chest pain and palpitations.   Gastrointestinal:  Negative for abdominal pain, nausea and vomiting.   Genitourinary:  Negative for dysuria.   Musculoskeletal:  Positive for arthralgias.   Skin:  Negative for rash.   Neurological:  Negative for dizziness and headaches.   Psychiatric/Behavioral:  Negative for dysphoric mood. The patient is not nervous/anxious.        Objective:    /78 (BP Location: Right arm, Patient Position: Sitting, Cuff Size: Large)   Pulse 96   " Temp 98.5 °F (36.9 °C)   Resp 20   Ht 5' 8\" (1.727 m)   Wt 118 kg (260 lb 3.2 oz)   SpO2 98%   BMI 39.56 kg/m²     Physical Exam  Vitals and nursing note reviewed.   Constitutional:       General: He is not in acute distress.     Appearance: He is obese. He is not ill-appearing or toxic-appearing.   HENT:      Head: Normocephalic and atraumatic.      Mouth/Throat:      Mouth: Mucous membranes are moist.   Eyes:      General: No scleral icterus.  Pulmonary:      Effort: Pulmonary effort is normal. No respiratory distress.   Abdominal:      Comments: Obese, protuberant   Musculoskeletal:         General: Normal range of motion.   Skin:     General: Skin is dry.      Coloration: Skin is not jaundiced.   Neurological:      General: No focal deficit present.      Mental Status: He is alert and oriented to person, place, and time. Mental status is at baseline.   Psychiatric:         Mood and Affect: Mood normal.         Behavior: Behavior normal.         Thought Content: Thought content normal.         Judgment: Judgment normal.      .  "

## 2024-04-09 NOTE — PATIENT INSTRUCTIONS
Goals:    Food log (ie.) www.[employer] Medical[employer].com,[employer].com,[employer] Exchange[employer].com,[employer].com,etc.   No sugary beverages. At least 64oz of water daily.  Increase physical activity by 10 minutes daily. Gradually increase physical activity to a goal of 5 days per week for 30 minutes of MODERATE intensity PLUS 2 days per week of FULL BODY resistance training  4597-3918 calories per day  5-10 servings of fruits and vegetables per day  25-35 grams of dietary fiber per day

## 2024-04-11 ENCOUNTER — HOSPITAL ENCOUNTER (OUTPATIENT)
Dept: GASTROENTEROLOGY | Facility: HOSPITAL | Age: 54
Setting detail: OUTPATIENT SURGERY
End: 2024-04-11
Attending: STUDENT IN AN ORGANIZED HEALTH CARE EDUCATION/TRAINING PROGRAM
Payer: COMMERCIAL

## 2024-04-11 ENCOUNTER — ANESTHESIA (OUTPATIENT)
Dept: GASTROENTEROLOGY | Facility: HOSPITAL | Age: 54
End: 2024-04-11

## 2024-04-11 ENCOUNTER — ANESTHESIA EVENT (OUTPATIENT)
Dept: GASTROENTEROLOGY | Facility: HOSPITAL | Age: 54
End: 2024-04-11

## 2024-04-11 VITALS
TEMPERATURE: 97.6 F | RESPIRATION RATE: 18 BRPM | DIASTOLIC BLOOD PRESSURE: 71 MMHG | SYSTOLIC BLOOD PRESSURE: 124 MMHG | OXYGEN SATURATION: 98 % | HEART RATE: 71 BPM

## 2024-04-11 DIAGNOSIS — Z12.11 SCREENING FOR COLON CANCER: ICD-10-CM

## 2024-04-11 PROBLEM — E66.9 OBESITY: Status: ACTIVE | Noted: 2024-04-09

## 2024-04-11 PROCEDURE — 88305 TISSUE EXAM BY PATHOLOGIST: CPT | Performed by: PATHOLOGY

## 2024-04-11 RX ORDER — SODIUM CHLORIDE, SODIUM LACTATE, POTASSIUM CHLORIDE, CALCIUM CHLORIDE 600; 310; 30; 20 MG/100ML; MG/100ML; MG/100ML; MG/100ML
125 INJECTION, SOLUTION INTRAVENOUS CONTINUOUS
Status: DISCONTINUED | OUTPATIENT
Start: 2024-04-11 | End: 2024-04-15 | Stop reason: HOSPADM

## 2024-04-11 RX ORDER — PROPOFOL 10 MG/ML
INJECTION, EMULSION INTRAVENOUS AS NEEDED
Status: DISCONTINUED | OUTPATIENT
Start: 2024-04-11 | End: 2024-04-11

## 2024-04-11 RX ORDER — PROPOFOL 10 MG/ML
INJECTION, EMULSION INTRAVENOUS CONTINUOUS PRN
Status: DISCONTINUED | OUTPATIENT
Start: 2024-04-11 | End: 2024-04-11

## 2024-04-11 RX ADMIN — PROPOFOL 120 MG: 10 INJECTION, EMULSION INTRAVENOUS at 09:24

## 2024-04-11 RX ADMIN — PROPOFOL 100 MCG/KG/MIN: 10 INJECTION, EMULSION INTRAVENOUS at 09:24

## 2024-04-11 RX ADMIN — SODIUM CHLORIDE, SODIUM LACTATE, POTASSIUM CHLORIDE, AND CALCIUM CHLORIDE 125 ML/HR: .6; .31; .03; .02 INJECTION, SOLUTION INTRAVENOUS at 08:41

## 2024-04-11 NOTE — ANESTHESIA PREPROCEDURE EVALUATION
Procedure:  COLONOSCOPY    Relevant Problems   PULMONARY   (+) PAT (obstructive sleep apnea)   (+) Simple chronic bronchitis (HCC)      Behavioral Health   (+) Cigarette nicotine dependence without complication      Other   (+) Obesity   (+) Prediabetes      Echo stress 4/2023    Left Ventricle: Left ventricular cavity size is normal. Systolic function is normal. Wall motion is normal.    Stress ECG: The stress ECG is negative for ischemia after maximal exercise, without reproduction of symptoms.    Peak Stress Echo: Left ventricle cavity has normal reduction in size at peak stress. The left ventricle systolic function is normal at peak stress. The peak stress echo showed normal wall motion.    Echo Post Impression: The study is normal.         Physical Exam    Airway    Mallampati score: II  TM Distance: >3 FB  Neck ROM: full     Dental   No notable dental hx     Cardiovascular      Pulmonary      Other Findings        Anesthesia Plan  ASA Score- 3     Anesthesia Type- IV sedation with anesthesia with ASA Monitors.         Additional Monitors:     Airway Plan:            Plan Factors-Exercise tolerance (METS): >4 METS.    Chart reviewed. EKG reviewed.  Existing labs reviewed. Patient summary reviewed.    Patient is a current smoker.  Patient instructed to abstain from smoking on day of procedure. Patient smoked on day of surgery.    Obstructive sleep apnea risk education given perioperatively.        Induction-     Postoperative Plan-     Informed Consent- Anesthetic plan and risks discussed with patient.  I personally reviewed this patient with the CRNA. Discussed and agreed on the Anesthesia Plan with the CRNA..

## 2024-04-11 NOTE — ANESTHESIA POSTPROCEDURE EVALUATION
Post-Op Assessment Note    CV Status:  Stable    Pain management: adequate       Mental Status:  Alert and awake   Hydration Status:  Euvolemic   PONV Controlled:  Controlled   Airway Patency:  Patent  Two or more mitigation strategies used for obstructive sleep apnea   Post Op Vitals Reviewed: Yes    No anethesia notable event occurred.    Staff: Anesthesiologist, CRNA               BP   110/73   Temp 97   Pulse 72   Resp 16   SpO2 96

## 2024-04-11 NOTE — H&P
[facility]  History & Physical     PATIENT INFO     Name: Christiano Monroe  YOB: 1970   Age: 53 y.o.   Sex: male   MRN: [id number]     HISTORY OF PRESENT ILLNESS     Christiano Monroe is a 53 y.o. year old male who presents for screening colonoscopy. No prior colonoscopy. No antithrombotics or anticoagulants.     REVIEW OF SYSTEMS     Per the HPI, and otherwise unremarkable.    Historical Information   Past Medical History:   Diagnosis Date    Chest pain      History reviewed. No pertinent surgical history.  Social History   Social History     Substance and Sexual Activity   Alcohol Use Yes    Comment: occasionally     Social History     Substance and Sexual Activity   Drug Use No     Social History     Tobacco Use   Smoking Status Every Day    Current packs/day: 0.50    Average packs/day: 1 pack/day for 39.3 years (38.6 ttl pk-yrs)    Types: Cigarettes    Start date: 1985    Passive exposure: Never   Smokeless Tobacco Never   Tobacco Comments    10 to 12 cigarettes a day he did  quit from 6762-4563     Family History   Problem Relation Age of Onset    Cancer Mother         esophageal    Cancer Father         prostate    Obesity Daughter     Heart disease Maternal Grandfather     Heart disease Paternal Grandfather     Diabetes Neg Hx     Stroke Neg Hx         MEDICATIONS & ALLERGIES     Current Outpatient Medications   Medication Instructions    albuterol (ProAir HFA) 90 mcg/act inhaler 2 puffs, Inhalation, Every 6 hours PRN    EPINEPHrine (EPIPEN) 0.3 mg, Intramuscular, Once    fluticasone (FLONASE) 50 mcg/act nasal spray 2 sprays, Nasal, Daily    Na Sulfate-K Sulfate-Mg Sulf (Suprep Bowel Prep Kit) 17.5-3.13-1.6 GM/177ML SOLN 177 mL, Oral, Once, Take 177 mL by mouth once for 1 dose    olopatadine (PATANOL) 0.1 % ophthalmic solution 1 drop, Both Eyes, 2 times daily    sildenafil (VIAGRA) 100 mg tablet     tamsulosin (FLOMAX) 0.4 mg, Oral, Daily with dinner     terbinafine (LAMISIL) 250 mg, Oral, Daily    umeclidinium-vilanterol 62.5-25 mcg/actuation inhaler 1 puff, Inhalation, Daily     Allergies   Allergen Reactions    Pollen Extract Itching        PHYSICAL EXAM      Objective   Blood pressure 121/81, pulse 72, temperature 97.6 °F (36.4 °C), temperature source Temporal, resp. rate 18, SpO2 100%. There is no height or weight on file to calculate BMI.    General Appearance:   Alert, cooperative, no distress   Lungs:   Equal chest rise, respirations unlabored    Heart:   Regular rate and rhythm   Abdomen:   Soft, non-tender, non-distended; normal bowel sounds; no masses, no organomegaly    Extremities:   No edema       ASSESSMENT & PLAN     This is a 53 y.o. year old male here for colonoscopy, and he is stable and optimized for his procedure.      Yonatan Priest D.O.  [facility]  Division of Gastroenterology & Hepatology  Available on TigerText  [email or web address]    ** Please Note: This note is constructed using a voice recognition dictation system. **

## 2024-04-15 PROCEDURE — 88305 TISSUE EXAM BY PATHOLOGIST: CPT | Performed by: PATHOLOGY

## 2024-04-17 ENCOUNTER — TELEPHONE (OUTPATIENT)
Age: 54
End: 2024-04-17

## 2024-04-17 NOTE — TELEPHONE ENCOUNTER
Pt. Called for pathology results, reviewed results with pt. With recommendations, pt. Verbalized understanding, no further review needed

## 2024-04-30 ENCOUNTER — LAB (OUTPATIENT)
Age: 54
End: 2024-04-30
Payer: COMMERCIAL

## 2024-04-30 DIAGNOSIS — Z13.220 ENCOUNTER FOR SCREENING FOR LIPID DISORDER: ICD-10-CM

## 2024-04-30 DIAGNOSIS — Z11.59 NEED FOR HEPATITIS C SCREENING TEST: ICD-10-CM

## 2024-04-30 DIAGNOSIS — R73.9 ELEVATED BLOOD SUGAR: ICD-10-CM

## 2024-04-30 DIAGNOSIS — Z11.4 SCREENING FOR HIV (HUMAN IMMUNODEFICIENCY VIRUS): ICD-10-CM

## 2024-04-30 DIAGNOSIS — Z12.5 SCREENING FOR MALIGNANT NEOPLASM OF PROSTATE: ICD-10-CM

## 2024-04-30 LAB
BASOPHILS # BLD AUTO: 0.03 THOUSANDS/ÂΜL (ref 0–0.1)
BASOPHILS NFR BLD AUTO: 1 % (ref 0–1)
CHOLEST SERPL-MCNC: 132 MG/DL
EOSINOPHIL # BLD AUTO: 0.33 THOUSAND/ÂΜL (ref 0–0.61)
EOSINOPHIL NFR BLD AUTO: 5 % (ref 0–6)
ERYTHROCYTE [DISTWIDTH] IN BLOOD BY AUTOMATED COUNT: 13.5 % (ref 11.6–15.1)
EST. AVERAGE GLUCOSE BLD GHB EST-MCNC: 123 MG/DL
HBA1C MFR BLD: 5.9 %
HCT VFR BLD AUTO: 50.4 % (ref 36.5–49.3)
HCV AB SER QL: NORMAL
HDLC SERPL-MCNC: 36 MG/DL
HGB BLD-MCNC: 16.8 G/DL (ref 12–17)
HIV 1+2 AB+HIV1 P24 AG SERPL QL IA: NORMAL
HIV 2 AB SERPL QL IA: NORMAL
HIV1 AB SERPL QL IA: NORMAL
HIV1 P24 AG SERPL QL IA: NORMAL
IMM GRANULOCYTES # BLD AUTO: 0.01 THOUSAND/UL (ref 0–0.2)
IMM GRANULOCYTES NFR BLD AUTO: 0 % (ref 0–2)
LDLC SERPL CALC-MCNC: 73 MG/DL (ref 0–100)
LYMPHOCYTES # BLD AUTO: 1.83 THOUSANDS/ÂΜL (ref 0.6–4.47)
LYMPHOCYTES NFR BLD AUTO: 28 % (ref 14–44)
MCH RBC QN AUTO: 31.5 PG (ref 26.8–34.3)
MCHC RBC AUTO-ENTMCNC: 33.3 G/DL (ref 31.4–37.4)
MCV RBC AUTO: 95 FL (ref 82–98)
MONOCYTES # BLD AUTO: 0.51 THOUSAND/ÂΜL (ref 0.17–1.22)
MONOCYTES NFR BLD AUTO: 8 % (ref 4–12)
NEUTROPHILS # BLD AUTO: 3.8 THOUSANDS/ÂΜL (ref 1.85–7.62)
NEUTS SEG NFR BLD AUTO: 58 % (ref 43–75)
NRBC BLD AUTO-RTO: 0 /100 WBCS
PLATELET # BLD AUTO: 270 THOUSANDS/UL (ref 149–390)
PMV BLD AUTO: 10 FL (ref 8.9–12.7)
PSA SERPL-MCNC: 1.17 NG/ML (ref 0–4)
RBC # BLD AUTO: 5.33 MILLION/UL (ref 3.88–5.62)
TRIGL SERPL-MCNC: 115 MG/DL
WBC # BLD AUTO: 6.51 THOUSAND/UL (ref 4.31–10.16)

## 2024-04-30 PROCEDURE — 83036 HEMOGLOBIN GLYCOSYLATED A1C: CPT

## 2024-04-30 PROCEDURE — 36415 COLL VENOUS BLD VENIPUNCTURE: CPT

## 2024-04-30 PROCEDURE — 85025 COMPLETE CBC W/AUTO DIFF WBC: CPT

## 2024-04-30 PROCEDURE — 80053 COMPREHEN METABOLIC PANEL: CPT

## 2024-04-30 PROCEDURE — 87389 HIV-1 AG W/HIV-1&-2 AB AG IA: CPT

## 2024-04-30 PROCEDURE — 80061 LIPID PANEL: CPT

## 2024-04-30 PROCEDURE — 86803 HEPATITIS C AB TEST: CPT

## 2024-04-30 PROCEDURE — G0103 PSA SCREENING: HCPCS

## 2024-05-01 PROBLEM — Z12.11 SCREENING FOR COLON CANCER: Status: RESOLVED | Noted: 2024-03-13 | Resolved: 2024-05-01

## 2024-05-06 ENCOUNTER — HOSPITAL ENCOUNTER (OUTPATIENT)
Dept: CT IMAGING | Facility: HOSPITAL | Age: 54
Discharge: HOME | End: 2024-05-06
Attending: INTERNAL MEDICINE
Payer: COMMERCIAL

## 2024-05-06 DIAGNOSIS — F17.210 SMOKING GREATER THAN 20 PACK YEARS: ICD-10-CM

## 2024-05-06 PROCEDURE — 71271 CT THORAX LUNG CANCER SCR C-: CPT

## 2024-05-07 ENCOUNTER — OFFICE VISIT (OUTPATIENT)
Dept: UROLOGY | Facility: CLINIC | Age: 54
End: 2024-05-07
Payer: COMMERCIAL

## 2024-05-07 VITALS
SYSTOLIC BLOOD PRESSURE: 120 MMHG | BODY MASS INDEX: 38.04 KG/M2 | OXYGEN SATURATION: 94 % | WEIGHT: 251 LBS | HEIGHT: 68 IN | DIASTOLIC BLOOD PRESSURE: 72 MMHG | HEART RATE: 83 BPM

## 2024-05-07 DIAGNOSIS — N40.1 BENIGN PROSTATIC HYPERPLASIA WITH URINARY FREQUENCY: Primary | ICD-10-CM

## 2024-05-07 DIAGNOSIS — R35.0 BENIGN PROSTATIC HYPERPLASIA WITH URINARY FREQUENCY: Primary | ICD-10-CM

## 2024-05-07 DIAGNOSIS — Z12.5 SCREENING FOR MALIGNANT NEOPLASM OF PROSTATE: ICD-10-CM

## 2024-05-07 DIAGNOSIS — N52.9 ERECTILE DYSFUNCTION, UNSPECIFIED ERECTILE DYSFUNCTION TYPE: ICD-10-CM

## 2024-05-07 PROCEDURE — 99213 OFFICE O/P EST LOW 20 MIN: CPT

## 2024-05-07 RX ORDER — SILDENAFIL 100 MG/1
100 TABLET, FILM COATED ORAL AS NEEDED
Qty: 30 TABLET | Refills: 6 | Status: SHIPPED | OUTPATIENT
Start: 2024-05-07

## 2024-05-07 RX ORDER — TAMSULOSIN HYDROCHLORIDE 0.4 MG/1
0.4 CAPSULE ORAL
Qty: 30 CAPSULE | Refills: 11 | Status: SHIPPED | OUTPATIENT
Start: 2024-05-07

## 2024-05-07 NOTE — PROGRESS NOTES
2024    Chief Complaint   Patient presents with    Follow-up     BPH, ED, Prostate cancer screening. No urinary complaints states Flomax is helping with frequency.       Assessment and Plan    53 y.o. male manage by AP team    BPH  Stable symptoms continue Flomax 0.4 mg at bedtime  Follow-up 1 year    2.  Erectile dysfunction  Continue sildenafil 100 mg as needed prescription provided today with good Rx coupon  Follow-up 1 year    3. Prostate cancer screening  PSA trend  1.17 (2024)  0.7 (2023)  0.6 (3/25/2022)  Strong family history of prostate cancer in his father diagnosed at age 59  Rectal exam is benign today  Follow-up in 1 year with PSA.      Interval HPI:    History of Present Illness  Christiano Monroe is a 53 y.o. male here for annual follow-up evaluation of prostate cancer screening, BPH, and erectile dysfunction.     Established patient last seen by me May 2023 for annual follow-up. History of BPH and was started on Flomax 0.4 mg at bedtime and 2021 for reports of nocturia 3-4 times per night. Since starting Flomax patient reported significant improvement in his urinary symptoms.     ED: satisfactory improvement with Viagra 100 mg as needed.    Prostate cancer screening: Patient has a positive family history of prostate cancer in his father who was diagnosed at age 59,  Intra-Op RALP due to arterial injury.  Current PSA 1.17 as of 2024 previously 0.7 as of 2023 previously 0.6 on 3/25/2022.     Also history of microscopic hematuria, no previous work-up. No gross hematuria. POCT testing during last office visit showed trace blood, urinalysis and micro however negative 2023.             Review of Systems   Constitutional:  Negative for chills and fever.   HENT:  Negative for congestion and sore throat.    Respiratory:  Negative for cough and shortness of breath.    Cardiovascular:  Negative for chest pain and leg swelling.   Gastrointestinal:  Negative for  "abdominal pain, constipation and diarrhea.   Genitourinary:  Negative for difficulty urinating, dysuria, frequency, hematuria and urgency.   Musculoskeletal:  Negative for back pain and gait problem.   Skin:  Negative for wound.   Allergic/Immunologic: Negative for immunocompromised state.   Hematological:  Does not bruise/bleed easily.           AUA SYMPTOM SCORE      Flowsheet Row Most Recent Value   AUA SYMPTOM SCORE    How often have you had a sensation of not emptying your bladder completely after you finished urinating? 0 (P)    How often have you had to urinate again less than two hours after you finished urinating? 4 (P)    How often have you found you stopped and started again several times when you urinate? 0 (P)    How often have you found it difficult to postpone urination? 5 (P)    How often have you had a weak urinary stream? 0 (P)    How often have you had to push or strain to begin urination? 0 (P)    How many times did you most typically get up to urinate from the time you went to bed at night until the time you got up in the morning? 1 (P)    Quality of Life: If you were to spend the rest of your life with your urinary condition just the way it is now, how would you feel about that? 3 (P)    AUA SYMPTOM SCORE 10 (P)             Vitals  Vitals:    05/07/24 0947   BP: 120/72   Pulse: 83   SpO2: 94%   Weight: 114 kg (251 lb)   Height: 5' 8\" (1.727 m)       Physical Exam  Vitals reviewed.   Constitutional:       General: He is not in acute distress.     Appearance: Normal appearance. He is not ill-appearing or toxic-appearing.   HENT:      Head: Normocephalic and atraumatic.   Eyes:      General: No scleral icterus.     Conjunctiva/sclera: Conjunctivae normal.   Cardiovascular:      Rate and Rhythm: Normal rate.   Pulmonary:      Effort: Pulmonary effort is normal. No respiratory distress.   Abdominal:      Tenderness: There is no right CVA tenderness or left CVA tenderness.      Hernia: No hernia is " present.   Genitourinary:     Comments: Rectal exam reveals normal tone, no masses and his prostate is smooth, symmetric, and benign. No nodules.    Musculoskeletal:      Cervical back: Normal range of motion.      Right lower leg: No edema.      Left lower leg: No edema.   Skin:     General: Skin is warm and dry.      Coloration: Skin is not jaundiced or pale.   Neurological:      General: No focal deficit present.      Mental Status: He is alert and oriented to person, place, and time. Mental status is at baseline.      Gait: Gait normal.   Psychiatric:         Mood and Affect: Mood normal.         Behavior: Behavior normal.         Thought Content: Thought content normal.         Judgment: Judgment normal.         Past History  Past Medical History:   Diagnosis Date    Chest pain      Social History     Socioeconomic History    Marital status: /Civil Union     Spouse name: None    Number of children: None    Years of education: None    Highest education level: None   Occupational History    None   Tobacco Use    Smoking status: Every Day     Current packs/day: 0.50     Average packs/day: 1 pack/day for 39.3 years (38.7 ttl pk-yrs)     Types: Cigarettes     Start date: 1985     Passive exposure: Never    Smokeless tobacco: Never    Tobacco comments:     10 to 12 cigarettes a day he did  quit from 0532-3278   Vaping Use    Vaping status: Every Day   Substance and Sexual Activity    Alcohol use: Yes     Comment: occasionally    Drug use: No    Sexual activity: Yes   Other Topics Concern    None   Social History Narrative    None     Social Determinants of Health     Financial Resource Strain: Not on file   Food Insecurity: Not on file   Transportation Needs: Not on file   Physical Activity: Not on file   Stress: Not on file   Social Connections: Not on file   Intimate Partner Violence: Not on file   Housing Stability: Not on file     Social History     Tobacco Use   Smoking Status Every Day    Current  packs/day: 0.50    Average packs/day: 1 pack/day for 39.3 years (38.7 ttl pk-yrs)    Types: Cigarettes    Start date: 1985    Passive exposure: Never   Smokeless Tobacco Never   Tobacco Comments    10 to 12 cigarettes a day he did  quit from 6583-8303     Family History   Problem Relation Age of Onset    Cancer Mother         esophageal    Cancer Father         prostate    Obesity Daughter     Heart disease Maternal Grandfather     Heart disease Paternal Grandfather     Diabetes Neg Hx     Stroke Neg Hx        The following portions of the patient's history were reviewed and updated as appropriate allergies, current medications, past medical history, past social history, past surgical history and problem list    Imaging:    Results  No results found for this or any previous visit (from the past 1 hour(s)).]  Lab Results   Component Value Date    PSA 1.17 04/30/2024    PSA 0.7 04/14/2023    PSA 0.6 03/25/2022     Lab Results   Component Value Date    CALCIUM 8.7 04/30/2024    K 4.1 04/30/2024    CO2 21 04/30/2024     04/30/2024    BUN 8 04/30/2024    CREATININE 0.78 04/30/2024     Lab Results   Component Value Date    WBC 6.51 04/30/2024    HGB 16.8 04/30/2024    HCT 50.4 (H) 04/30/2024    MCV 95 04/30/2024     04/30/2024       Please Note:  Voice dictation software has been used to create this document. There may be inadvertent transcriptions errors.     JOLYNN Dey 05/07/24

## 2024-05-08 LAB
ALBUMIN SERPL BCP-MCNC: 4.2 G/DL (ref 3.5–5)
ALP SERPL-CCNC: 51 U/L (ref 34–104)
ALT SERPL W P-5'-P-CCNC: 38 U/L (ref 7–52)
ANION GAP SERPL CALCULATED.3IONS-SCNC: 10 MMOL/L (ref 4–13)
AST SERPL W P-5'-P-CCNC: 25 U/L (ref 13–39)
BILIRUB SERPL-MCNC: 0.39 MG/DL (ref 0.2–1)
BUN SERPL-MCNC: 8 MG/DL (ref 5–25)
CALCIUM SERPL-MCNC: 8.7 MG/DL (ref 8.4–10.2)
CHLORIDE SERPL-SCNC: 108 MMOL/L (ref 96–108)
CO2 SERPL-SCNC: 21 MMOL/L (ref 21–32)
CREAT SERPL-MCNC: 0.78 MG/DL (ref 0.6–1.3)
GFR SERPL CREATININE-BSD FRML MDRD: 103 ML/MIN/1.73SQ M
GLUCOSE P FAST SERPL-MCNC: 92 MG/DL (ref 65–99)
POTASSIUM SERPL-SCNC: 4.1 MMOL/L (ref 3.5–5.3)
PROT SERPL-MCNC: 6.9 G/DL (ref 6.4–8.4)
SODIUM SERPL-SCNC: 139 MMOL/L (ref 135–147)

## 2024-05-16 ENCOUNTER — TELEPHONE (OUTPATIENT)
Age: 54
End: 2024-05-16

## 2024-05-16 ENCOUNTER — TELEPHONE (OUTPATIENT)
Dept: FAMILY MEDICINE CLINIC | Facility: CLINIC | Age: 54
End: 2024-05-16

## 2024-05-16 NOTE — TELEPHONE ENCOUNTER
----- Message from Jose L Escamilla DO sent at 5/16/2024 12:41 PM EDT -----  Notify patient, no abnormal nodules on exam.  Recheck 1 year.

## 2024-06-04 DIAGNOSIS — Z00.6 ENCOUNTER FOR EXAMINATION FOR NORMAL COMPARISON OR CONTROL IN CLINICAL RESEARCH PROGRAM: ICD-10-CM

## 2024-06-10 ENCOUNTER — APPOINTMENT (OUTPATIENT)
Dept: LAB | Facility: HOSPITAL | Age: 54
End: 2024-06-10

## 2024-06-10 DIAGNOSIS — Z00.6 ENCOUNTER FOR EXAMINATION FOR NORMAL COMPARISON OR CONTROL IN CLINICAL RESEARCH PROGRAM: ICD-10-CM

## 2024-06-10 PROCEDURE — 36415 COLL VENOUS BLD VENIPUNCTURE: CPT

## 2024-09-10 LAB
APOB+LDLR+PCSK9 GENE MUT ANL BLD/T: NOT DETECTED
BRCA1+BRCA2 DEL+DUP + FULL MUT ANL BLD/T: NOT DETECTED
MLH1+MSH2+MSH6+PMS2 GN DEL+DUP+FUL M: NOT DETECTED

## 2025-01-14 ENCOUNTER — TELEPHONE (OUTPATIENT)
Dept: PULMONOLOGY | Facility: CLINIC | Age: 55
End: 2025-01-14

## 2025-03-06 ENCOUNTER — OFFICE VISIT (OUTPATIENT)
Dept: FAMILY MEDICINE CLINIC | Facility: CLINIC | Age: 55
End: 2025-03-06
Payer: COMMERCIAL

## 2025-03-06 VITALS
BODY MASS INDEX: 35.07 KG/M2 | HEART RATE: 79 BPM | WEIGHT: 231.4 LBS | DIASTOLIC BLOOD PRESSURE: 68 MMHG | SYSTOLIC BLOOD PRESSURE: 112 MMHG | OXYGEN SATURATION: 94 % | TEMPERATURE: 98.7 F | HEIGHT: 68 IN

## 2025-03-06 DIAGNOSIS — E66.01 SEVERE OBESITY (BMI 35.0-39.9) WITH COMORBIDITY (HCC): ICD-10-CM

## 2025-03-06 DIAGNOSIS — F17.210 CIGARETTE NICOTINE DEPENDENCE WITHOUT COMPLICATION: ICD-10-CM

## 2025-03-06 DIAGNOSIS — J41.0 SIMPLE CHRONIC BRONCHITIS (HCC): ICD-10-CM

## 2025-03-06 DIAGNOSIS — M25.562 PAIN IN BOTH KNEES, UNSPECIFIED CHRONICITY: ICD-10-CM

## 2025-03-06 DIAGNOSIS — F17.210 SMOKING GREATER THAN 20 PACK YEARS: ICD-10-CM

## 2025-03-06 DIAGNOSIS — M25.561 PAIN IN BOTH KNEES, UNSPECIFIED CHRONICITY: ICD-10-CM

## 2025-03-06 DIAGNOSIS — G47.33 OSA (OBSTRUCTIVE SLEEP APNEA): ICD-10-CM

## 2025-03-06 DIAGNOSIS — Z00.00 ANNUAL PHYSICAL EXAM: Primary | ICD-10-CM

## 2025-03-06 DIAGNOSIS — E78.2 MIXED HYPERLIPIDEMIA: ICD-10-CM

## 2025-03-06 DIAGNOSIS — R73.03 PREDIABETES: ICD-10-CM

## 2025-03-06 PROCEDURE — 99396 PREV VISIT EST AGE 40-64: CPT | Performed by: INTERNAL MEDICINE

## 2025-03-06 NOTE — ASSESSMENT & PLAN NOTE
Not taking Anoro but this has been effective in the past.  For now we will continue without any inhalers.

## 2025-03-06 NOTE — ASSESSMENT & PLAN NOTE
Continue monitoring this throughout the years.  Orders:  •  CBC and differential; Future  •  Comprehensive metabolic panel; Future  •  Hemoglobin A1C; Future

## 2025-03-06 NOTE — ASSESSMENT & PLAN NOTE
Continues on CPAP and compliant.  Does help him on his day-to-day's.  Orders:  •  CBC and differential; Future  •  Comprehensive metabolic panel; Future

## 2025-03-06 NOTE — ASSESSMENT & PLAN NOTE
Progress Note      Patient Name: Demarco Pollard   Patient ID: [id number]   Sex: Female   YOB: 2020    Primary Care Provider: Lois Rodriguez MD   Referring Provider: Lois Rodriguez MD    Visit Date: 2021    Provider: Lois Rodriguez MD   Location: [facility]   Location Address: [address] [place] [address]  [id number]   Location Phone: [phone]          Chief Complaint  · 9 month well child visit      History Of Present Illness  The patient is a 9 month old /White female who is brought to the office by her mother for a well child visit.   Interval History and Concerns  Mom has concerns about snoring   Development  Developmental milestones assessed:   Looks for something that has been dropped   Pulls to stand   Is afraid of new people   Goes to you to play and be comforted   Points things out   Sits well   Can repeat sounds   Looks at books   Crawls   Plays peek-a-torres   EPSDT  EPSDT: No           City/County/Bottled Water  Are you using bottled, county, or city water City        Screening  The results of the  screening tests are pending.   ACEs Questionnaire  ACEs Questionnaire: Negative   ____________________________________________________________________________________________  Sleep  The baby is sleeping continuously for up to 6-8 hours at a time.   Nutrition  The patient is being bottle-fed. She is eating approximately 6-8 ounces of Good Start Soothe every 6-8 hours. She is eating   stage 2 baby food, table food, and oatmeal 2-3 times per day.   Elimination  The infant is having approximately 0-1 stools per day and wets approximately 6-7 diapers per day.     She has an in-home sitter.   Growth Chart  Growth Chart Reviewed. (F3)   Immunizations  This infant may need Synagis for RSV prophylaxis: No.     Immunizations: Family refuses vaccines             Allergy List  Allergen Name Date Reaction Notes   NO  Has bilateral knee pain.  Continue with Motrin as needed.  Consider Celebrex or Mobic.  Likely arthritic, exam today suggestive of ligamental integrity.  Possible some laxity to the medial collateral.  Historically sounds meniscal in nature.  Not interested in follow-up with Ortho or any x-rays at this point.      "KNOWN DRUG ALLERGIES --  --  --        Allergies Reconciled  Review of Systems  · Constitutional  o Denies  o : fever, fussiness, agitation, fatigue, weight changes  · Eyes  o Denies  o : redness, discharge  · HENT  o Denies  o : rhinorrhea, congestion, ear drainage, pulling at ears, mouth sores  · Cardiovascular  o Denies  o : cyanosis, difficulty with feeds  · Respiratory  o Denies  o : frequent cough, wheezing, retractions, increased work of breathing  · Gastrointestinal  o Denies  o : vomiting, diarrhea, constipation, decreased PO intake  · Genitourinary  o Denies  o : hematuria, decreased urine output, discharge  · Integument  o Denies  o : rash, bruising, lesions  · Neurologic  o Denies  o : altered mental status, seizure activity, syncope  · Musculoskeletal  o Denies  o : limp, weakness  · Allergic-Immunologic  o Denies  o : frequent illnesses, allergies      Vitals  Date Time BP Position Site L\R Cuff Size HR RR TEMP (F) WT  HT  BMI kg/m2 BSA m2 O2 Sat FR L/min FiO2 HC       2020 08:40 AM      132 - R 32 98.2 16lbs 6oz 2'  3\" 15.79 0.38 100 %   17.25\"   01/27/2021 09:43 AM      123 - R  98.3 19lbs 2.5oz    98 %  21%    02/24/2021 09:52 AM      129 - R  97.8 20lbs 0.5oz 2'  5\" 16.75 0.43 100 %   18.25\"         Physical Examination  · Constitutional  o Appearance  o : active, well developed, well-nourished, well hydrated, alert, well-tended appearance  · Eyes  o Conjunctivae  o : conjunctiva normal, no exudates present  o Sclerae  o : sclerae nonicteric  o Pupils and Irises  o : pupils equal and round, pupils reactive to light bilaterally, symmetric light reflex, normal red red reflex  o Eyelids/Ocular Adnexae  o : eyelid appearance normal  · Ears, Nose, Mouth and Throat  o Ears  o :   § External Ears  § : external auditory canals normal  § Otoscopic Examination  § : tympanic membrane normal bilaterally, no PE tubes present  o Nose  o :   § External Nose  § : appearance normal  § Intranasal Exam  § : " mucosa within normal limits  o Oral Cavity  o :   § Oral Mucosa  § : mucous membranes moist and normal  § Lips  § : lip appearance normal  § Teeth  § : normal dentition for age  § Gums  § : gums pink, non-swollen, no bleeding present  § Tongue  § : tongue moist and normal  § Palate  § : hard palate normal, soft palate normal  · Respiratory  o Respiratory Effort  o : breathing unlabored  o Inspection of Chest  o : normal appearance  o Auscultation of Lungs  o : normal breath sounds bilaterally  · Cardiovascular  o Heart  o :   § Auscultation of Heart  § : regular rate, normal rhythm, no murmurs present  · Gastrointestinal  o Abdominal Examination  o : soft and nontender to palpation, nondistended, no masses present, normal bowel sounds  o Liver and spleen  o : no hepatomegaly, spleen not palpable  · Genitourinary  o External Genitalia  o : no inflammation, no adhesions or lesions present, normal developmental appearance for age  o Anus  o : no inflammation or lesions present  · Lymphatic  o Neck  o : no lymphadenopathy present  · Musculoskeletal  o Right Upper Extremity  o : normal range of motion  o Left Upper Extremity  o : normal range of motion  o Right Lower Extremity  o : normal range of motion, normal leg alignment  o Left Lower Extremity  o : normal range of motion, normal leg alignment  · Skin and Subcutaneous Tissue  o General Inspection  o : no rashes present, no lesions present, skin pink, no jaundice  o Digits and Nails  o : no clubbing, cyanosis, or edema present, normal appearing nails  · Neurologic  o Motor Examination  o :   § RUE Motor Function  § : tone normal  § LUE Motor Function  § : tone normal  § RLE Motor Function  § : tone normal  § LLE Motor Function  § : tone normal          Assessment  · Well child check     V20.2/Z00.129  · Counseling on injury prevention     V65.43/Z71.89    Problems Reconciled  Plan  · Orders  o ACO-39: Current medications updated and reviewed (, 7352I) - -  "02/24/2021  · Instructions  o Return in 3 months (12 months of age).  o Anticipatory guidance given.  o Handout given with age-appropriate specific care instructions and safety precautions.  o Use carseat rear-facing until 2 years.  o Diet discussed to include stage III vegetables, fruits and meats, sippy cup use, starting soft table foods (no honey or eggs).  o Limit juice to less than 6 oz per day; half-strength.  o Limit sun exposure, use sunscreen when the baby will be in the sun, with up to SPF 30 every 2 hours.  o Instructed on \"baby-proofing\" home and avoidance of chokable items.  o Educated on separation anxiety.  o Electronically Identified Patient Education Materials Provided Electronically            Electronically Signed by: Lois Rodriguez MD -Author on February 24, 2021 02:51:41 PM  "

## 2025-03-06 NOTE — PROGRESS NOTES
Adult Annual Physical  Name: Christiano Monroe      : 1970      MRN: [id number]  Encounter Provider: Jose L Escamilla DO  Encounter Date: 3/6/2025   Encounter department: [facility]    Assessment & Plan  Annual physical exam  Discussed healthy lifestyle.  Need for exercise.  Discussed supplements and vitamin replacements.  Recommended adequate sleep.  Continue with at least yearly well visits, and follow-up on the immunizations and screening tools that we advised today.         Prediabetes  Continue monitoring this throughout the years.  Orders:  •  CBC and differential; Future  •  Comprehensive metabolic panel; Future  •  Hemoglobin A1C; Future    PAT (obstructive sleep apnea)  Continues on CPAP and compliant.  Does help him on his day-to-day's.  Orders:  •  CBC and differential; Future  •  Comprehensive metabolic panel; Future    Severe obesity (BMI 35.0-39.9) with comorbidity (HCC)  Encourage diet and exercise.  Will see if he is drifted into the diabetic range, if so would certainly move towards an Ozempic or Mounjaro         Smoking greater than 20 pack years  Agreeable to low-dose CAT scan.  Orders:  •  CT lung screening program; Future    Cigarette nicotine dependence without complication  Declines smoking cessation options today.  Counseled on same.       Simple chronic bronchitis (HCC)  Not taking Anoro but this has been effective in the past.  For now we will continue without any inhalers.       Mixed hyperlipidemia  Continue periodic monitoring  Orders:  •  Lipid Panel with Direct LDL reflex; Future    Pain in both knees, unspecified chronicity  Has bilateral knee pain.  Continue with Motrin as needed.  Consider Celebrex or Mobic.  Likely arthritic, exam today suggestive of ligamental integrity.  Possible some laxity to the medial collateral.  Historically sounds meniscal in nature.  Not interested in follow-up with Ortho or any x-rays at this point.        Immunizations and preventive care screenings were discussed with patient today. Appropriate education was printed on patient's after visit summary.    Discussed risks and benefits of prostate cancer screening. We discussed the controversial history of PSA screening for prostate cancer in the [place] as well as the risk of over detection and over treatment of prostate cancer by way of PSA screening.  The patient understands that PSA blood testing is an imperfect way to screen for prostate cancer and that elevated PSA levels in the blood may also be caused by infection, inflammation, prostatic trauma or manipulation, urological procedures, or by benign prostatic enlargement.    The role of the digital rectal examination in prostate cancer screening was also discussed and I discussed with him that there is large interobserver variability in the findings of digital rectal examination.    Counseling:  Dental Health: discussed importance of regular tooth brushing, flossing, and dental visits.  Exercise: the importance of regular exercise/physical activity was discussed. Recommend exercise 3-5 times per week for at least 30 minutes.       Depression Screening and Follow-up Plan: Patient was screened for depression during today's encounter. They screened negative with a PHQ-2 score of 0.      Tobacco Cessation Counseling: Tobacco cessation counseling was provided. The patient is sincerely urged to quit consumption of tobacco. He is not ready to quit tobacco. Medication options and side effects of medication discussed. Patient refused medication.     Lung Cancer Screening Shared Decision Making: I discussed with him that he is a candidate for lung cancer CT screening.     The following Shared Decision-Making points were covered:  Benefits of screening were discussed, including the rates of reduction in death from lung cancer and other causes.  Harms of screening were reviewed, including false positive tests,  radiation exposure levels, risks of invasive procedures, risks of complications of screening, and risk of overdiagnosis.  I counseled on the importance of adherence to annual lung cancer LDCT screening, impact of co-morbidities, and ability or willingness to undergo diagnosis and treatment.  I counseled on the importance of maintaining abstinence as a former smoker or was counseled on the importance of smoking cessation if a current smoker    Review of Eligibility Criteria: He meets all of the criteria for Lung Cancer Screening.   - He is 54 y.o.   - He has 20 pack year tobacco history and is a current smoker or has quit within the past 15 years  - He presents no signs or symptoms of lung cancer    After discussion, the patient decided to elect lung cancer screening.        History of Present Illness     Adult Annual Physical:  Patient presents for annual physical. Patient is here for annual wellness.  Overall feels good.  Problematically dealing with bilateral knee pain.  He has no chest pain no shortness of breath.  Denies any bowels or bladder.  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  He does continue to smoke, now has almost 40 pack years.  When asked if he exercises, he says he does a lot at work.  There has been no significant weight gain or weight loss since her last appointment.  Agreeable to low-dose CAT scan.  Up-to-date on colon screening..     Diet and Physical Activity:  - Diet/Nutrition: well balanced diet.  - Exercise: walking and moderate cardiovascular exercise.    Depression Screening:  - PHQ-2 Score: 0    General Health:  - Sleep: sleeps well and 7-8 hours of sleep on average.  - Hearing: normal hearing bilateral ears.  - Vision: no vision problems, most recent eye exam < 1 year ago and wears glasses and contacts.  - Dental: regular dental visits and brushes teeth once daily.     Health:  - History of STDs: no.   - Urinary symptoms: nocturia.     Advanced Care Planning:  - Has an  "advanced directive?: no    - Has a durable medical POA?: no    - ACP document given to patient?: no      Review of Systems   Constitutional:  Negative for chills and fever.   HENT:  Negative for congestion and sore throat.    Eyes:  Negative for pain and visual disturbance.   Respiratory:  Negative for cough and shortness of breath.    Cardiovascular:  Negative for chest pain and palpitations.   Gastrointestinal:  Negative for abdominal pain and vomiting.   Genitourinary:  Negative for dysuria and hematuria.   Musculoskeletal:  Positive for arthralgias. Negative for back pain.   Skin:  Negative for color change and rash.   Neurological:  Negative for dizziness, syncope and light-headedness.   All other systems reviewed and are negative.        Objective   /68 (BP Location: Left arm, Patient Position: Sitting, Cuff Size: Large)   Pulse 79   Temp 98.7 °F (37.1 °C) (Tympanic)   Ht 5' 8\" (1.727 m)   Wt 105 kg (231 lb 6.4 oz)   SpO2 94%   BMI 35.18 kg/m²     Physical Exam  Vitals and nursing note reviewed.   Constitutional:       General: He is not in acute distress.     Appearance: Normal appearance. He is well-developed.      Comments: Overweight   HENT:      Head: Normocephalic and atraumatic.   Eyes:      Conjunctiva/sclera: Conjunctivae normal.   Cardiovascular:      Rate and Rhythm: Normal rate and regular rhythm.      Pulses: Normal pulses.      Heart sounds: Normal heart sounds. No murmur heard.  Pulmonary:      Effort: Pulmonary effort is normal. No respiratory distress.      Breath sounds: Normal breath sounds.   Abdominal:      Palpations: Abdomen is soft.      Tenderness: There is no abdominal tenderness.   Musculoskeletal:         General: No swelling.      Cervical back: Neck supple.   Skin:     General: Skin is warm and dry.      Capillary Refill: Capillary refill takes less than 2 seconds.   Neurological:      General: No focal deficit present.      Mental Status: He is alert and oriented to " person, place, and time.   Psychiatric:         Mood and Affect: Mood normal.

## 2025-03-11 ENCOUNTER — TELEPHONE (OUTPATIENT)
Age: 55
End: 2025-03-11

## 2025-03-11 DIAGNOSIS — M25.562 PAIN IN BOTH KNEES, UNSPECIFIED CHRONICITY: Primary | ICD-10-CM

## 2025-03-11 DIAGNOSIS — M25.561 PAIN IN BOTH KNEES, UNSPECIFIED CHRONICITY: Primary | ICD-10-CM

## 2025-03-11 RX ORDER — CELECOXIB 200 MG/1
200 CAPSULE ORAL DAILY
Qty: 30 CAPSULE | Refills: 3 | Status: SHIPPED | OUTPATIENT
Start: 2025-03-11

## 2025-03-11 NOTE — TELEPHONE ENCOUNTER
Patient says that icing his knees is not helping.  He is requesting an anti-inflammatory, as Dr. Escamilla mentioned.

## 2025-03-11 NOTE — TELEPHONE ENCOUNTER
A prescription for Celebrex has been sent to [facility].   daily.  May take several weeks to be effective.

## 2025-03-12 ENCOUNTER — OFFICE VISIT (OUTPATIENT)
Dept: OBGYN CLINIC | Facility: CLINIC | Age: 55
End: 2025-03-12
Payer: COMMERCIAL

## 2025-03-12 ENCOUNTER — APPOINTMENT (OUTPATIENT)
Dept: RADIOLOGY | Facility: CLINIC | Age: 55
End: 2025-03-12
Payer: COMMERCIAL

## 2025-03-12 ENCOUNTER — OFFICE VISIT (OUTPATIENT)
Dept: URGENT CARE | Facility: CLINIC | Age: 55
End: 2025-03-12
Payer: COMMERCIAL

## 2025-03-12 VITALS — WEIGHT: 232 LBS | HEIGHT: 68 IN | BODY MASS INDEX: 35.16 KG/M2

## 2025-03-12 VITALS
HEIGHT: 68 IN | HEART RATE: 82 BPM | SYSTOLIC BLOOD PRESSURE: 115 MMHG | WEIGHT: 220 LBS | BODY MASS INDEX: 33.34 KG/M2 | TEMPERATURE: 98 F | RESPIRATION RATE: 18 BRPM | DIASTOLIC BLOOD PRESSURE: 74 MMHG | OXYGEN SATURATION: 99 %

## 2025-03-12 DIAGNOSIS — M25.462 EFFUSION OF LEFT KNEE: ICD-10-CM

## 2025-03-12 DIAGNOSIS — L03.116 CELLULITIS OF LEFT KNEE: Primary | ICD-10-CM

## 2025-03-12 DIAGNOSIS — M25.562 ACUTE PAIN OF LEFT KNEE: ICD-10-CM

## 2025-03-12 DIAGNOSIS — M25.561 ACUTE PAIN OF RIGHT KNEE: ICD-10-CM

## 2025-03-12 DIAGNOSIS — M70.42 PREPATELLAR BURSITIS OF LEFT KNEE: Primary | ICD-10-CM

## 2025-03-12 DIAGNOSIS — L03.116 CELLULITIS OF LEFT KNEE: ICD-10-CM

## 2025-03-12 PROCEDURE — 99214 OFFICE O/P EST MOD 30 MIN: CPT | Performed by: NURSE PRACTITIONER

## 2025-03-12 PROCEDURE — 73562 X-RAY EXAM OF KNEE 3: CPT

## 2025-03-12 PROCEDURE — 99214 OFFICE O/P EST MOD 30 MIN: CPT | Performed by: STUDENT IN AN ORGANIZED HEALTH CARE EDUCATION/TRAINING PROGRAM

## 2025-03-12 RX ORDER — CEFADROXIL 500 MG/1
500 CAPSULE ORAL EVERY 12 HOURS SCHEDULED
Qty: 14 CAPSULE | Refills: 0 | Status: SHIPPED | OUTPATIENT
Start: 2025-03-12 | End: 2025-03-19

## 2025-03-12 RX ORDER — CEPHALEXIN 500 MG/1
500 CAPSULE ORAL EVERY 8 HOURS SCHEDULED
Qty: 21 CAPSULE | Refills: 0 | Status: SHIPPED | OUTPATIENT
Start: 2025-03-12 | End: 2025-03-12

## 2025-03-12 NOTE — LETTER
March 12, 2025     Patient: Christiano Monroe  YOB: 1970  Date of Visit: 3/12/2025      To Whom it May Concern:    Christiano Monroe is under my professional care. Christiano Espino was seen in my office on 3/12/2025. He is not cleared to return to work. He will be reevaluated on 3/14/2025.    If you have any questions or concerns, please don't hesitate to call.          Sincerely,          Lawrence Gabriel MD        CC: No Recipients

## 2025-03-12 NOTE — PROGRESS NOTES
[facility]        NAME: Christiano Monroe is a 54 y.o. male  : 1970    MRN: [id number]  DATE: 2025  TIME: 10:57 AM    Assessment and Plan   Cellulitis of left knee [L03.116]  1. Cellulitis of left knee  XR knee 3 vw left non injury    cephalexin (KEFLEX) 500 mg capsule      2. Acute pain of left knee  XR knee 3 vw left non injury    cephalexin (KEFLEX) 500 mg capsule      3. Acute pain of right knee  XR knee 3 vw right non injury      4. Effusion of left knee  cephalexin (KEFLEX) 500 mg capsule            Patient Instructions       Follow up with PCP in 3-5 days.  Proceed to  ER if symptoms worsen.    If tests have been performed at [facility], our office will contact you with results if changes need to be made to the care plan discussed with you at the visit.  You can review your full results on [facility].    You have an appointment with Dr. Gabriel at 2pm today.  You are to apply warm compresses to the left knee.  Do not take any antibiotics as he may want to aspirate the knee for infection.   Concern you could have cellulitis vs septic joint.       You have been prescribed cephalexin DO NOT TAKE UNTIL AFTER YOU ARE SEEN BY ORTHO AND INFORM THEM YOU WERE PRESCRIBED THIS MEDICATION - THEY MAY CHANGE IT IF NEEDED     Your xray was preliminarily read by your provider.  A radiologist will read the xray and you will be notified if it is abnormal.      If tests have been performed at [facility], our office will contact you with results if changes need to be made to the care plan discussed with you at the visit.  You can review your full results on [facility].        Chief Complaint     Chief Complaint   Patient presents with    Knee Pain     Left knee is swollen, painful for 3 days. Denies injury         History of Present Illness       This is a 54 year old male who comes to care now with c/o left knee pain redness and swelling x 2 days. Denies any injuries, fevers or hx  of surgery.  He states PCP started him on celebrex yesterday.  He states that the right knee has pain as well but is not red or swollen like the left.   Denies calling Dr. Gabriel as he has seen him for shoulder injury 1 yr ago.    PMH is listed and reviewed.     Knee Pain         Review of Systems   Review of Systems   Constitutional: Negative.    HENT: Negative.     Eyes: Negative.    Respiratory: Negative.     Cardiovascular: Negative.    Gastrointestinal: Negative.    Endocrine: Negative.    Genitourinary: Negative.    Musculoskeletal:         B/l knee pain  Left is red and swollen    Skin:  Positive for color change.   Allergic/Immunologic: Negative.    Neurological: Negative.    Hematological: Negative.    Psychiatric/Behavioral: Negative.           Current Medications       Current Outpatient Medications:     celecoxib (CeleBREX) 200 mg capsule, Take 1 capsule (200 mg total) by mouth daily, Disp: 30 capsule, Rfl: 3    cephalexin (KEFLEX) 500 mg capsule, Take 1 capsule (500 mg total) by mouth every 8 (eight) hours for 7 days, Disp: 21 capsule, Rfl: 0    tamsulosin (FLOMAX) 0.4 mg, Take 1 capsule (0.4 mg total) by mouth daily with dinner, Disp: 30 capsule, Rfl: 11    albuterol (ProAir HFA) 90 mcg/act inhaler, Inhale 2 puffs every 6 (six) hours as needed for wheezing or shortness of breath (Patient not taking: Reported on 3/12/2025), Disp: 8.5 g, Rfl: 3    fluticasone (FLONASE) 50 mcg/act nasal spray, 2 sprays into each nostril daily (Patient not taking: Reported on 3/12/2025), Disp: 16 g, Rfl: 3    sildenafil (VIAGRA) 100 mg tablet, Take 1 tablet (100 mg total) by mouth as needed for erectile dysfunction (Patient not taking: Reported on 3/12/2025), Disp: 30 tablet, Rfl: 6    umeclidinium-vilanterol 62.5-25 mcg/actuation inhaler, Inhale 1 puff daily (Patient not taking: Reported on 3/6/2025), Disp: 180 blister, Rfl: 3    Current Allergies     Allergies as of 03/12/2025 - Reviewed 03/12/2025   Allergen Reaction  "Noted    Pollen extract Itching 04/10/2023            The following portions of the patient's history were reviewed and updated as appropriate: allergies, current medications, past family history, past medical history, past social history, past surgical history and problem list.     Past Medical History:   Diagnosis Date    Chest pain     GERD (gastroesophageal reflux disease) 01/01/1992       Past Surgical History:   Procedure Laterality Date    HERNIA REPAIR  1975       Family History   Problem Relation Age of Onset    Cancer Mother         esophageal    Cancer Father         prostate    Obesity Daughter     Heart disease Maternal Grandfather     Heart disease Paternal Grandfather     Diabetes Neg Hx     Stroke Neg Hx          Medications have been verified.        Objective   /74   Pulse 82   Temp 98 °F (36.7 °C) (Temporal)   Resp 18   Ht 5' 8\" (1.727 m)   Wt 99.8 kg (220 lb)   SpO2 99%   BMI 33.45 kg/m²   No LMP for male patient.       Physical Exam     Physical Exam  Vitals and nursing note reviewed.   Constitutional:       General: He is not in acute distress.     Appearance: Normal appearance. He is obese. He is not ill-appearing, toxic-appearing or diaphoretic.   HENT:      Head: Normocephalic and atraumatic.      Nose: Nose normal.      Mouth/Throat:      Mouth: Mucous membranes are moist.   Eyes:      Extraocular Movements: Extraocular movements intact.   Cardiovascular:      Rate and Rhythm: Normal rate.      Pulses: Normal pulses.   Pulmonary:      Effort: Pulmonary effort is normal.   Musculoskeletal:         General: Swelling and tenderness present. No deformity or signs of injury.      Cervical back: Normal range of motion.   Skin:     General: Skin is warm and dry.      Capillary Refill: Capillary refill takes less than 2 seconds.      Findings: Erythema present.          Neurological:      General: No focal deficit present.      Mental Status: He is alert and oriented to person, place, " and time.   Psychiatric:         Mood and Affect: Mood normal.         Behavior: Behavior normal.         Thought Content: Thought content normal.         Judgment: Judgment normal.         Preliminary reading left knee  ? Effusion   ? Tibial platue avulsion fx vs osteophyte  Waiting on rad read    Preliminary reading right knee  No acute process seen   Waiting on rad read

## 2025-03-12 NOTE — PROGRESS NOTES
Ortho Sports Medicine Clinic Visit       Assessment & Plan  Prepatellar bursitis of left knee    Orders:    cefadroxil (DURICEF) 500 mg capsule; Take 1 capsule (500 mg total) by mouth every 12 (twelve) hours for 7 days    I reviewed the history, exam, and imaging with the patient in clinic today.  Patient has had anterior knee pain, swelling, limited range of motion, and possible erythema over the knee for 2 days.  The patient's radiographs which show prepatellar swelling but no effusion, fracture, and or significant degenerative change.  On exam, patient does have prepatellar swelling and a small area of erythema.  He has no tenderness to touch of the prepatellar area.  He does have limited flexion due to a feeling of tightness over the anterior aspect of the knee.  I discussed with the patient that symptoms could be due to prepatellar bursitis versus cellulitis.  Patient does not have any systemic signs of infection including no fevers, chills.  I discussed with the patient that I would recommend continuing Celebrex to help with prepatellar bursitis.  I also recommend starting Duricef for possible cellulitis versus septic bursitis.  I did provide him with a prescription for the antibiotic clinic today.  I did encourage him to ice the knee to help with swelling.  I also instructed not to kneel or minimize any contact with the anterior aspect of the knee which could exacerbate his bursitis.  I do recommend he continue to monitor his symptoms and that if he does notice increasing swelling, expanding erythema, increasing pain, swelling in the knee, fevers, chills, or other signs of infection that he should reach out to the clinic immediately or go to the emergency department for evaluation.  I recommend the patient follow-up in 2 days for repeat evaluation. The patient demonstrated understanding of the discussion and was in agreement with the plan.  All of the questions were answered.  Patient can reach out to clinic  with any questions or concerns at any time.      Follow up: 2 days  Imaging: none      Chief Complaint   Patient presents with    Left Knee - Swelling     Started swelling on Monday sometime at work, started getting very painful on Monday as the day went on.       History of Present Illness:  The patient is a 54 y.o. male seen in clinic for left knee pain.  Patient states that pain started 2 days ago 3/10/2025. He denies any specific injury or trauma.  Patient states the pain is located anteriorly and associated with swelling over the kneecap. He endorses increased redness and swelling over the anterior left knee.  He states his pain is aggravated by flexion or walking.  Patient complains of associated tightness.  He has tried ice and Motrin with some relief of his pain.  Patient had a phone visit with his PCP where he was prescribed Celebrex, and instructed to discontinue Motrin.  He states he was seen at the urgent care this morning where there was concern for cellulitis of the left knee.  He was provided prescription for Keflex at the urgent care and was instructed to wait until following up with orthopedics before filling the prescription for antibiotics. Patient states the swelling has increased since onset.  Patient states the redness started today 3/12/2025.  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  He denies any numbness, tingling, instability or mechanical symptoms.   He denies any prior injuries or surgeries to the left knee.  Patient denies any fevers, chills, or other signs of systemic infection.    Occupation: auto repair/body     The patient has the following co-morbidities: Tobacco abuse, prediabetes      Knee Surgical History:  None    Past Medical, Social and Family History:  Past Medical History:   Diagnosis Date    Chest pain     GERD (gastroesophageal reflux disease) 01/01/1992     Past Surgical History:   Procedure  Laterality Date    HERNIA REPAIR  1975     Allergies   Allergen Reactions    Pollen Extract Itching     Current Outpatient Medications on File Prior to Visit   Medication Sig Dispense Refill    celecoxib (CeleBREX) 200 mg capsule Take 1 capsule (200 mg total) by mouth daily 30 capsule 3    tamsulosin (FLOMAX) 0.4 mg Take 1 capsule (0.4 mg total) by mouth daily with dinner 30 capsule 11    [DISCONTINUED] cephalexin (KEFLEX) 500 mg capsule Take 1 capsule (500 mg total) by mouth every 8 (eight) hours for 7 days 21 capsule 0    albuterol (ProAir HFA) 90 mcg/act inhaler Inhale 2 puffs every 6 (six) hours as needed for wheezing or shortness of breath (Patient not taking: Reported on 3/12/2025) 8.5 g 3    fluticasone (FLONASE) 50 mcg/act nasal spray 2 sprays into each nostril daily (Patient not taking: Reported on 3/12/2025) 16 g 3    sildenafil (VIAGRA) 100 mg tablet Take 1 tablet (100 mg total) by mouth as needed for erectile dysfunction (Patient not taking: Reported on 3/12/2025) 30 tablet 6    umeclidinium-vilanterol 62.5-25 mcg/actuation inhaler Inhale 1 puff daily (Patient not taking: Reported on 3/6/2025) 180 blister 3     No current facility-administered medications on file prior to visit.     Social History     Socioeconomic History    Marital status: /Civil Union     Spouse name: Not on file    Number of children: Not on file    Years of education: Not on file    Highest education level: Not on file   Occupational History    Not on file   Tobacco Use    Smoking status: Every Day     Current packs/day: 0.50     Average packs/day: 1 pack/day for 40.2 years (39.1 ttl pk-yrs)     Types: Cigarettes     Start date: 1985     Passive exposure: Never    Smokeless tobacco: Never    Tobacco comments:     10 to 12 cigarettes a day he did  quit from [id number]   Vaping Use    Vaping status: Every Day   Substance and Sexual Activity    Alcohol use: Yes     Comment: Drink 2 or 3 Beers a month if that    Drug use: No     Sexual activity: Not Currently     Partners: Female   Other Topics Concern    Not on file   Social History Narrative    Not on file     Social Drivers of Health     Financial Resource Strain: Not on file   Food Insecurity: Not on file   Transportation Needs: Not on file   Physical Activity: Not on file   Stress: Not on file   Social Connections: Not on file   Intimate Partner Violence: Not on file   Housing Stability: Not on file         I have reviewed the past medical, surgical, social and family history, medications and allergies as documented in the EMR.      Physical Exam:    Focused left knee exam:  Ambulates with a normal gait.    Inspection:  - Skin intact  - Ecchymosis: no  - Erythema: small area noted over superior pole of patella  - Gross deformity: no  - Previous surgical incisions: no  - Effusion: mild prepatellar swelling     Palpation:  - Medial patellar facet: no  - Lateral patellar facet: no  - Tibial tubercle: no  - Patella tendon: no  - Pes anserine bursa: no  - Gerdy's tubercle: no  - Popliteal fossa: no    - Lateral epicondyle: no  - Medial epicondyle: no  - Posterior calf: no    Range of motion:  - Extension: 3 degrees  - Flexion: 100 degrees    Strength:  - Patient able to perform a straight leg raise  - Hip flexion: 5/5  - Knee extension: 5/5  - Knee flexion: 5/5  - Ankle DF: 5/5  - Ankle PF: 5/5    Meniscus:  - Medial joint line tenderness: no  - Lateral joint line tenderness: no  - Rosalina's: no  - Flexion compression: no  - Thessaly test: n/a    Collateral ligaments:  -  Varus laxity at full extension: no  -  Varus laxity at full in 30° of knee flexion: no  -  Valgus laxity at full extension: no  -  Valgus laxity at full in 30° of knee flexion: no    Cruciate ligaments:  - Lachman: 1A  - Pivot shift test: no  - Anterior drawer: 1A  - Posterior drawer: 1A  - Posterior tibial sag: no    Patella:  - Apprehension with lateral patellar translation: no  - Able to sublux 2 quadrant with firm  endpoint  - Apprehension with medial patellar translation: no  - Able to sublux 2 quadrant with firm endpoint  - J sign: no  - Patella grind test: no  - Patella crepitus: no  - Patella tilt: no  - Squat test: n/a    Range of motion testing of the hip and ankle are within normal limits.    No calf tenderness to palpation bilaterally. Negative Néstor test    LE NV Exam:   +2 DP/PT pulses bilaterally  Sensation intact to light touch L2-S1 bilaterally, SPN/DPN/tibial/saphenous/sural nerve distributions  Motor intact in SPN/DPN/TA nerve distributions        Knee Imaging    X-rays of the left knee were obtained on 3/12/2025 and reviewed with the patient. Per my independent review, the imaging shows thickening of the soft tissue in the prepatellar area with no evidence of fracture, dislocation, or degenerative changes      Procedures:  None      This note was written with the use of dictation software. Please excuse any errors.      Scribe Attestation      I,:  Delgado Aj PA-C am acting as a scribe while in the presence of the attending physician.:       I,:  Lawrence Gabriel MD personally performed the services described in this documentation    as scribed in my presence.:

## 2025-03-12 NOTE — LETTER
March 12, 2025     Patient: Christiano Monroe  YOB: 1970  Date of Visit: 3/12/2025      To Whom it May Concern:    Christiano Monroe is under my professional care. Christiano Espino was seen in my office on 3/12/2025. Christiano is not cleared to return to work at this time. He will be reevaluated in on 3/14/2025.    If you have any questions or concerns, please don't hesitate to call.          Sincerely,          Lawrence Gabriel MD        CC: No Recipients

## 2025-03-12 NOTE — PATIENT INSTRUCTIONS
You have an appointment with Dr. Gabriel at 2pm today.  You are to apply warm compresses to the left knee.  Do not take any antibiotics as he may want to aspirate the knee for infection.   Concern you could have cellulitis vs septic joint.       You have been prescribed cephalexin DO NOT TAKE UNTIL AFTER YOU ARE SEEN BY ORTHO AND INFORM THEM YOU WERE PRESCRIBED THIS MEDICATION - THEY MAY CHANGE IT IF NEEDED     Your xray was preliminarily read by your provider.  A radiologist will read the xray and you will be notified if it is abnormal.      If tests have been performed at [facility], our office will contact you with results if changes need to be made to the care plan discussed with you at the visit.  You can review your full results on [facility] MyChart.

## 2025-03-14 VITALS — BODY MASS INDEX: 35.16 KG/M2 | HEIGHT: 68 IN | WEIGHT: 232 LBS

## 2025-03-14 DIAGNOSIS — M70.42 PREPATELLAR BURSITIS OF LEFT KNEE: Primary | ICD-10-CM

## 2025-03-14 PROCEDURE — 99213 OFFICE O/P EST LOW 20 MIN: CPT | Performed by: STUDENT IN AN ORGANIZED HEALTH CARE EDUCATION/TRAINING PROGRAM

## 2025-03-14 NOTE — LETTER
March 14, 2025     Patient: Christiano Monroe  YOB: 1970  Date of Visit: 3/14/2025      To Whom it May Concern:    Christiano Monroe is under my professional care. Christiano Espino was seen in my office on 3/14/2025. He is cleared to return to work with no restrictions on 3/17/2025.     If you have any questions or concerns, please don't hesitate to call.          Sincerely,          Lawrence Gabriel MD        CC: No Recipients

## 2025-03-14 NOTE — PROGRESS NOTES
Ortho Sports Medicine Clinic Visit       Assessment & Plan  Prepatellar bursitis of left knee       I reviewed the history and exam with the patient include today.  The patient has been taking Duricef as prescribed.  He is also continued with Celebrex as provided by his primary care physician.  He states that his symptoms have improved since last visit.  He feels that the swelling in the prepatellar bursa has improved.  He denies any worsening of the erythema over the anterior aspect of the knee.  He denies any fevers, chills, or other signs infection.  I discussed with the patient that this point I recommend he complete the 7-day course of Duricef.  I discussed he can continue to take Celebrex as prescribed.  I recommended that he avoid kneeling or contact to the anterior aspect of the knee which could exacerbate the bursitis.  I did discuss if he does develop any erythema, increased swelling, drainage, fevers, chills, or other signs of infection over the weekend that he should go to the emergency department for evaluation.  I did provide him a work note stating that he can return to full duty on 3/17/2025 assuming his symptoms continue to improve.  I discussed that he can follow-up on an as-needed basis. The patient demonstrated understanding of the discussion and was in agreement with the plan.  All of the questions were answered.  Patient can reach out to clinic with any questions or concerns at any time.      Follow up: as needed  Imaging: none      Chief Complaint   Patient presents with    Left Knee - Swelling       History of Present Illness:  The patient is a 54 y.o. male seen in clinic for left knee pain. Patient was last seen on 3/12/2025 where there was concern for prepatellar bursitis with possible underlying cellulitis versus septic bursitis.  Patient was given a prescription for Duricef for 7 days.  He was also taking Celebrex.  In clinic today, the patient states that he feels the swelling has  improved.  He has minimal erythema at this point.  He denies any fevers, chills, or other signs of infection.  Overall, he does feel that the left knee symptoms are improving.    Prior history:  Patient states that pain started 2 days ago 3/10/2025. He denies any specific injury or trauma.  Patient states the pain is located anteriorly and associated with swelling over the kneecap. He endorses increased redness and swelling over the anterior left knee.  He states his pain is aggravated by flexion or walking.  Patient complains of associated tightness.  He has tried ice and Motrin with some relief of his pain.  Patient had a phone visit with his PCP where he was prescribed Celebrex, and instructed to discontinue Motrin.  He states he was seen at the urgent care this morning where there was concern for cellulitis of the left knee.  He was provided prescription for Keflex at the urgent care and was instructed to wait until following up with orthopedics before filling the prescription for antibiotics. Patient states the swelling has increased since onset.  Patient states the redness started today 3/12/2025.  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  He denies any numbness, tingling, instability or mechanical symptoms.   He denies any prior injuries or surgeries to the left knee.  Patient denies any fevers, chills, or other signs of systemic infection.    Occupation: auto repair/body     The patient has the following co-morbidities: Tobacco abuse, prediabetes      Knee Surgical History:  None    Past Medical, Social and Family History:  Past Medical History:   Diagnosis Date    Chest pain     GERD (gastroesophageal reflux disease) 01/01/1992     Past Surgical History:   Procedure Laterality Date    HERNIA REPAIR  1975     Allergies   Allergen Reactions    Pollen Extract Itching     Current Outpatient Medications on File Prior to Visit    Medication Sig Dispense Refill    cefadroxil (DURICEF) 500 mg capsule Take 1 capsule (500 mg total) by mouth every 12 (twelve) hours for 7 days 14 capsule 0    celecoxib (CeleBREX) 200 mg capsule Take 1 capsule (200 mg total) by mouth daily 30 capsule 3    albuterol (ProAir HFA) 90 mcg/act inhaler Inhale 2 puffs every 6 (six) hours as needed for wheezing or shortness of breath (Patient not taking: Reported on 3/12/2025) 8.5 g 3    fluticasone (FLONASE) 50 mcg/act nasal spray 2 sprays into each nostril daily (Patient not taking: Reported on 3/12/2025) 16 g 3    sildenafil (VIAGRA) 100 mg tablet Take 1 tablet (100 mg total) by mouth as needed for erectile dysfunction (Patient not taking: Reported on 3/12/2025) 30 tablet 6    tamsulosin (FLOMAX) 0.4 mg Take 1 capsule (0.4 mg total) by mouth daily with dinner (Patient not taking: Reported on 3/14/2025) 30 capsule 11    umeclidinium-vilanterol 62.5-25 mcg/actuation inhaler Inhale 1 puff daily (Patient not taking: Reported on 3/6/2025) 180 blister 3     No current facility-administered medications on file prior to visit.     Social History     Socioeconomic History    Marital status: /Civil Union     Spouse name: Not on file    Number of children: Not on file    Years of education: Not on file    Highest education level: Not on file   Occupational History    Not on file   Tobacco Use    Smoking status: Every Day     Current packs/day: 0.50     Average packs/day: 1 pack/day for 40.2 years (39.1 ttl pk-yrs)     Types: Cigarettes     Start date: 1985     Passive exposure: Never    Smokeless tobacco: Never    Tobacco comments:     10 to 12 cigarettes a day he did  quit from [id number]   Vaping Use    Vaping status: Every Day   Substance and Sexual Activity    Alcohol use: Yes     Comment: Drink 2 or 3 Beers a month if that    Drug use: No    Sexual activity: Not Currently     Partners: Female   Other Topics Concern    Not on file   Social History Narrative    Not on  file     Social Drivers of Health     Financial Resource Strain: Not on file   Food Insecurity: Not on file   Transportation Needs: Not on file   Physical Activity: Not on file   Stress: Not on file   Social Connections: Not on file   Intimate Partner Violence: Not on file   Housing Stability: Not on file         I have reviewed the past medical, surgical, social and family history, medications and allergies as documented in the EMR.      Physical Exam:    Focused left knee exam:  Ambulates with a normal gait.    Inspection:  - Skin intact  - Ecchymosis: no  - Erythema: small area noted over superior pole of patella, improved from previous visit  - Gross deformity: no  - Previous surgical incisions: no  - Effusion: mild prepatellar swelling improved from prior visit    Palpation:  - Medial patellar facet: no  - Lateral patellar facet: no  - Tibial tubercle: no  - Patella tendon: no  - Pes anserine bursa: no  - Gerdy's tubercle: no  - Popliteal fossa: no    - Lateral epicondyle: no  - Medial epicondyle: no  - Posterior calf: no    Range of motion:  - Extension: 0 degrees  - Flexion: 110 degrees    Strength:  - Patient able to perform a straight leg raise  - Hip flexion: 5/5  - Knee extension: 5/5  - Knee flexion: 5/5  - Ankle DF: 5/5  - Ankle PF: 5/5    Meniscus:  - Medial joint line tenderness: no  - Lateral joint line tenderness: no  - Rosalina's: no  - Flexion compression: no  - Thessaly test: n/a    Collateral ligaments:  -  Varus laxity at full extension: no  -  Varus laxity at full in 30° of knee flexion: no  -  Valgus laxity at full extension: no  -  Valgus laxity at full in 30° of knee flexion: no    Cruciate ligaments:  - Lachman: 1A  - Pivot shift test: no  - Anterior drawer: 1A  - Posterior drawer: 1A  - Posterior tibial sag: no    Patella:  - Apprehension with lateral patellar translation: no  - Able to sublux 2 quadrant with firm endpoint  - Apprehension with medial patellar translation: no  - Able to  sublux 2 quadrant with firm endpoint  - J sign: no  - Patella grind test: no  - Patella crepitus: no  - Patella tilt: no  - Squat test: n/a    Range of motion testing of the hip and ankle are within normal limits.    No calf tenderness to palpation bilaterally. Negative Néstor test    LE NV Exam:   +2 DP/PT pulses bilaterally  Sensation intact to light touch L2-S1 bilaterally, SPN/DPN/tibial/saphenous/sural nerve distributions  Motor intact in SPN/DPN/TA nerve distributions        Knee Imaging    X-rays of the left knee were obtained on 3/17/2025 and reviewed with the patient. Per my independent review, the imaging shows thickening of the soft tissue in the prepatellar area with no evidence of fracture, dislocation, or degenerative changes      Procedures:  None      This note was written with the use of dictation software. Please excuse any errors.      Scribe Attestation      I,:  Delgado Aj PA-C am acting as a scribe while in the presence of the attending physician.:       I,:  Lawrence Gabriel MD personally performed the services described in this documentation    as scribed in my presence.:

## 2025-05-03 ENCOUNTER — RESULTS FOLLOW-UP (OUTPATIENT)
Dept: EMERGENCY DEPT | Facility: HOSPITAL | Age: 55
End: 2025-05-03

## 2025-05-03 ENCOUNTER — APPOINTMENT (OUTPATIENT)
Dept: LAB | Facility: CLINIC | Age: 55
End: 2025-05-03
Payer: COMMERCIAL

## 2025-05-03 DIAGNOSIS — E78.2 MIXED HYPERLIPIDEMIA: ICD-10-CM

## 2025-05-03 DIAGNOSIS — G47.33 OSA (OBSTRUCTIVE SLEEP APNEA): ICD-10-CM

## 2025-05-03 DIAGNOSIS — Z12.5 SCREENING FOR MALIGNANT NEOPLASM OF PROSTATE: ICD-10-CM

## 2025-05-03 DIAGNOSIS — R73.03 PREDIABETES: ICD-10-CM

## 2025-05-03 LAB
ALBUMIN SERPL BCG-MCNC: 4.4 G/DL (ref 3.5–5)
ALP SERPL-CCNC: 54 U/L (ref 34–104)
ALT SERPL W P-5'-P-CCNC: 27 U/L (ref 7–52)
ANION GAP SERPL CALCULATED.3IONS-SCNC: 6 MMOL/L (ref 4–13)
AST SERPL W P-5'-P-CCNC: 21 U/L (ref 13–39)
BASOPHILS # BLD AUTO: 0.03 THOUSANDS/ÂΜL (ref 0–0.1)
BASOPHILS NFR BLD AUTO: 1 % (ref 0–1)
BILIRUB SERPL-MCNC: 0.59 MG/DL (ref 0.2–1)
BUN SERPL-MCNC: 15 MG/DL (ref 5–25)
CALCIUM SERPL-MCNC: 8.8 MG/DL (ref 8.4–10.2)
CHLORIDE SERPL-SCNC: 105 MMOL/L (ref 96–108)
CHOLEST SERPL-MCNC: 161 MG/DL (ref ?–200)
CO2 SERPL-SCNC: 24 MMOL/L (ref 21–32)
CREAT SERPL-MCNC: 0.77 MG/DL (ref 0.6–1.3)
EOSINOPHIL # BLD AUTO: 0.2 THOUSAND/ÂΜL (ref 0–0.61)
EOSINOPHIL NFR BLD AUTO: 3 % (ref 0–6)
ERYTHROCYTE [DISTWIDTH] IN BLOOD BY AUTOMATED COUNT: 13.5 % (ref 11.6–15.1)
EST. AVERAGE GLUCOSE BLD GHB EST-MCNC: 131 MG/DL
GFR SERPL CREATININE-BSD FRML MDRD: 102 ML/MIN/1.73SQ M
GLUCOSE P FAST SERPL-MCNC: 93 MG/DL (ref 65–99)
HBA1C MFR BLD: 6.2 %
HCT VFR BLD AUTO: 51.2 % (ref 36.5–49.3)
HDLC SERPL-MCNC: 44 MG/DL
HGB BLD-MCNC: 17 G/DL (ref 12–17)
IMM GRANULOCYTES # BLD AUTO: 0.03 THOUSAND/UL (ref 0–0.2)
IMM GRANULOCYTES NFR BLD AUTO: 1 % (ref 0–2)
LDLC SERPL CALC-MCNC: 103 MG/DL (ref 0–100)
LYMPHOCYTES # BLD AUTO: 1.24 THOUSANDS/ÂΜL (ref 0.6–4.47)
LYMPHOCYTES NFR BLD AUTO: 21 % (ref 14–44)
MCH RBC QN AUTO: 31.4 PG (ref 26.8–34.3)
MCHC RBC AUTO-ENTMCNC: 33.2 G/DL (ref 31.4–37.4)
MCV RBC AUTO: 95 FL (ref 82–98)
MONOCYTES # BLD AUTO: 0.6 THOUSAND/ÂΜL (ref 0.17–1.22)
MONOCYTES NFR BLD AUTO: 10 % (ref 4–12)
NEUTROPHILS # BLD AUTO: 3.94 THOUSANDS/ÂΜL (ref 1.85–7.62)
NEUTS SEG NFR BLD AUTO: 64 % (ref 43–75)
NRBC BLD AUTO-RTO: 0 /100 WBCS
PLATELET # BLD AUTO: 239 THOUSANDS/UL (ref 149–390)
PMV BLD AUTO: 10.2 FL (ref 8.9–12.7)
POTASSIUM SERPL-SCNC: 4.2 MMOL/L (ref 3.5–5.3)
PROT SERPL-MCNC: 7 G/DL (ref 6.4–8.4)
PSA SERPL-MCNC: 1.94 NG/ML (ref 0–4)
RBC # BLD AUTO: 5.42 MILLION/UL (ref 3.88–5.62)
SODIUM SERPL-SCNC: 135 MMOL/L (ref 135–147)
TRIGL SERPL-MCNC: 72 MG/DL (ref ?–150)
WBC # BLD AUTO: 6.04 THOUSAND/UL (ref 4.31–10.16)

## 2025-05-03 PROCEDURE — G0103 PSA SCREENING: HCPCS

## 2025-05-03 PROCEDURE — 85025 COMPLETE CBC W/AUTO DIFF WBC: CPT

## 2025-05-03 PROCEDURE — 80053 COMPREHEN METABOLIC PANEL: CPT

## 2025-05-03 PROCEDURE — 83036 HEMOGLOBIN GLYCOSYLATED A1C: CPT

## 2025-05-03 PROCEDURE — 80061 LIPID PANEL: CPT

## 2025-05-03 PROCEDURE — 36415 COLL VENOUS BLD VENIPUNCTURE: CPT

## 2025-05-08 ENCOUNTER — OFFICE VISIT (OUTPATIENT)
Dept: UROLOGY | Facility: CLINIC | Age: 55
End: 2025-05-08
Payer: COMMERCIAL

## 2025-05-08 VITALS
SYSTOLIC BLOOD PRESSURE: 122 MMHG | WEIGHT: 233.7 LBS | OXYGEN SATURATION: 96 % | BODY MASS INDEX: 35.42 KG/M2 | TEMPERATURE: 98 F | HEIGHT: 68 IN | HEART RATE: 74 BPM | DIASTOLIC BLOOD PRESSURE: 90 MMHG

## 2025-05-08 DIAGNOSIS — R97.20 RISING PSA LEVEL: Primary | ICD-10-CM

## 2025-05-08 DIAGNOSIS — N40.1 BENIGN PROSTATIC HYPERPLASIA WITH URINARY FREQUENCY: ICD-10-CM

## 2025-05-08 DIAGNOSIS — Z80.42 FAMILY HISTORY OF PROSTATE CANCER IN FATHER: ICD-10-CM

## 2025-05-08 DIAGNOSIS — R35.0 BENIGN PROSTATIC HYPERPLASIA WITH URINARY FREQUENCY: ICD-10-CM

## 2025-05-08 PROCEDURE — 99213 OFFICE O/P EST LOW 20 MIN: CPT

## 2025-05-08 RX ORDER — TAMSULOSIN HYDROCHLORIDE 0.4 MG/1
0.4 CAPSULE ORAL
Qty: 30 CAPSULE | Refills: 11 | Status: SHIPPED | OUTPATIENT
Start: 2025-05-08

## 2025-05-08 NOTE — PROGRESS NOTES
Name: Christiano Monroe      : 1970      MRN: [id number]  Encounter Provider: JOLYNN Dey  Encounter Date: 2025   Encounter department: [facility] [place]    :  Assessment & Plan  Rising PSA level  PSA Trend  1.94 (5/3/2025)   1.17 (2024)   0.7 (2023)   0.6 (3/25/2022)   Rectal exam today is benign.  Strong family history of prostate cancer in his father who was diagnosed at 59, unfortunately his father passed away intraoperative during his RALP due to arterial injury.  PSA considered normal however given slight rise in his strong family history of prostate cancer I wish to recheck this in a 6-month period to ensure stability.  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    Orders:    PSA, total and free; Future    Family history of prostate cancer in father  As per plan above  Orders:    PSA, total and free; Future    Benign prostatic hyperplasia with urinary frequency  Continue Flomax 0.4 mg at bedtime  Follow-up in 1 year.  Orders:    tamsulosin (FLOMAX) 0.4 mg; Take 1 capsule (0.4 mg total) by mouth daily with dinner          Interval HPI:    He presents today reporting doing well and offers no complaints.  Denies any bothersome urinary symptoms from baseline.          History of Present Illness     Established patient last seen by me in May 2024 for follow-up evaluation of prostate cancer screening, BPH, and erectile dysfunction    BPH:  He was started on Flomax 0.4 mg at bedtime and 2021 for reports of nocturia 3-4 times per night. Since starting Flomax patient reported significant improvement in his urinary symptoms.     ED: satisfactory improvement with Viagra 100 mg as needed.     Prostate cancer screening:   Patient has a positive family history of prostate cancer in his father who  was diagnosed at age 59,  Intra-Op RALP due to arterial injury.    PSA Trend   1.94 (5/3/2025)   1.17 (2024)   0.7 (2023)   0.6 (3/25/2022)            Objective   There were no vitals taken for this visit.    Review of Systems   Constitutional:  Negative for chills and fever.   HENT:  Negative for congestion and sore throat.    Respiratory:  Negative for cough and shortness of breath.    Cardiovascular:  Negative for chest pain and leg swelling.   Gastrointestinal:  Negative for abdominal pain, constipation and diarrhea.   Genitourinary:  Negative for difficulty urinating, dysuria, frequency, hematuria and urgency.   Musculoskeletal:  Negative for back pain and gait problem.   Skin:  Negative for wound.   Allergic/Immunologic: Negative for immunocompromised state.   Hematological:  Does not bruise/bleed easily.       Physical Exam  Vitals and nursing note reviewed.   Constitutional:       General: He is not in acute distress.     Appearance: He is well-developed.   HENT:      Head: Normocephalic and atraumatic.   Eyes:      Conjunctiva/sclera: Conjunctivae normal.   Cardiovascular:      Rate and Rhythm: Normal rate and regular rhythm.      Heart sounds: No murmur heard.  Pulmonary:      Effort: Pulmonary effort is normal. No respiratory distress.      Breath sounds: Normal breath sounds.   Abdominal:      Palpations: Abdomen is soft.      Tenderness: There is no abdominal tenderness.   Genitourinary:     Comments: Rectal exam reveals normal tone, no masses and his prostate is smooth, symmetric, and benign. No nodules.    Musculoskeletal:         General: No swelling.      Cervical back: Neck supple.   Skin:     General: Skin is warm and dry.      Capillary Refill: Capillary refill takes less than 2 seconds.   Neurological:      Mental Status: He is alert.   Psychiatric:         Mood and Affect: Mood normal.           Imaging:          Please Note:  Voice dictation software has been used to create  this document. There may be inadvertent transcriptions errors.     JOLYNN Dey 05/08/25

## 2025-05-16 ENCOUNTER — HOSPITAL ENCOUNTER (OUTPATIENT)
Dept: CT IMAGING | Facility: HOSPITAL | Age: 55
Discharge: HOME | End: 2025-05-16
Attending: INTERNAL MEDICINE

## 2025-05-16 DIAGNOSIS — F17.210 SMOKING GREATER THAN 20 PACK YEARS: ICD-10-CM
